# Patient Record
Sex: FEMALE | Race: WHITE | NOT HISPANIC OR LATINO | Employment: UNEMPLOYED | URBAN - METROPOLITAN AREA
[De-identification: names, ages, dates, MRNs, and addresses within clinical notes are randomized per-mention and may not be internally consistent; named-entity substitution may affect disease eponyms.]

---

## 2017-02-14 ENCOUNTER — OFFICE VISIT (OUTPATIENT)
Dept: HEMATOLOGY/ONCOLOGY | Facility: CLINIC | Age: 52
End: 2017-02-14
Payer: COMMERCIAL

## 2017-02-14 VITALS
TEMPERATURE: 98 F | DIASTOLIC BLOOD PRESSURE: 58 MMHG | RESPIRATION RATE: 16 BRPM | HEART RATE: 83 BPM | SYSTOLIC BLOOD PRESSURE: 124 MMHG

## 2017-02-14 DIAGNOSIS — C50.411 MALIGNANT NEOPLASM OF UPPER-OUTER QUADRANT OF RIGHT FEMALE BREAST: Primary | ICD-10-CM

## 2017-02-14 PROCEDURE — 99999 PR PBB SHADOW E&M-EST. PATIENT-LVL III: CPT | Mod: PBBFAC,,, | Performed by: INTERNAL MEDICINE

## 2017-02-14 PROCEDURE — 99213 OFFICE O/P EST LOW 20 MIN: CPT | Mod: S$GLB,,, | Performed by: INTERNAL MEDICINE

## 2017-02-14 RX ORDER — AZELASTINE 1 MG/ML
SPRAY, METERED NASAL
Refills: 0 | COMMUNITY
Start: 2017-01-31 | End: 2018-06-28

## 2017-02-14 RX ORDER — BENZONATATE 200 MG/1
CAPSULE ORAL
Refills: 0 | COMMUNITY
Start: 2017-01-19 | End: 2017-02-14

## 2017-02-14 NOTE — PROGRESS NOTES
Subjective:       Patient ID: Astrid Mane is a 51 y.o. female.    Chief Complaint: Breast Cancer    HPI Mrs. Mane is a 51-year-old white female seen for  carcinoma of the right breast.    Physically she's been feeling well.  Only complaint is some excessive sweating with exercise.  She's having no unusual pain and she has no shortness of breath.  Hot flashes continue to be controlled.       She reports no new issues.  She's exercising 6 days a week appetite and bowel function have been good.  She has some minor arthralgias in her hands.    Hot flashes are well controlled with Effexor     Mammograms on February 23, 2015 showed an asymmetric density in the right breast which had become more conspicuous.  A biopsy was recommended.   On March 2 a needle biopsy showed infiltrating ductal carcinoma grade 2 with some intermediate grade DCIS.  The tumor was % ER positive, % CO positive, HER-2 was 2+ with negative FISH.  An MRI on March 6 showed a 1.1 cm lesion in the posterior right breast.  On March 23, A left breast biopsy for an abnormality noted at the 1:00 position showed focal adenosis and focal ductal hyperplasia without atypia.    On March 26 she underwent lumpectomy and sentinel lymph node biopsy which was performed by Dr. Ishmael Aguilera at [facility].  The pathology from that surgery revealed a  low grade infiltrating ductal carcinoma, histologic grade 2, nuclear grade 2, mitotic index 1, which measured 6 mm.  There was some associated DCIS. Margins were negative.  0/2 axillary lymph nodes were involved with tumor.     Final pathological stage TIb N0, stage IA.  An Oncotype score returned at 18 (low risk).    She completed radiation therapy on June 4, 2015 and started tamoxifen several weeks later.  Review of Systems   Constitutional: Negative for activity change, appetite change and unexpected weight change.        Some mild hot flashes   Respiratory: Negative for cough and  shortness of breath.    Cardiovascular: Negative for chest pain.   Gastrointestinal: Negative for abdominal pain and nausea.   Musculoskeletal: Negative for arthralgias and back pain.   Neurological: Negative for headaches.   Psychiatric/Behavioral: Positive for sleep disturbance. Negative for dysphoric mood. The patient is not nervous/anxious.        Objective:      Physical Exam   Constitutional: She appears well-developed and well-nourished. No distress.   Eyes: No scleral icterus.   Cardiovascular: Normal rate, regular rhythm and normal heart sounds.    Pulmonary/Chest: Effort normal and breath sounds normal. She has no wheezes. She has no rales. Right breast exhibits no mass, no nipple discharge and no skin change. Left breast exhibits no mass, no nipple discharge and no skin change.       Abdominal: Soft. She exhibits no mass. There is no tenderness.   Musculoskeletal: She exhibits no edema.   Skin: No rash noted. No erythema.   Psychiatric: She has a normal mood and affect. Her behavior is normal. Thought content normal.       Assessment:       1. Malignant neoplasm of upper-outer quadrant of right female breast        Plan:           RTC 3 months.

## 2017-03-18 ENCOUNTER — PATIENT MESSAGE (OUTPATIENT)
Dept: HEMATOLOGY/ONCOLOGY | Facility: CLINIC | Age: 52
End: 2017-03-18

## 2017-05-02 ENCOUNTER — DOCUMENTATION ONLY (OUTPATIENT)
Dept: HEMATOLOGY/ONCOLOGY | Facility: CLINIC | Age: 52
End: 2017-05-02

## 2017-05-02 DIAGNOSIS — T45.1X5A HOT FLASHES DUE TO TAMOXIFEN: ICD-10-CM

## 2017-05-02 DIAGNOSIS — R23.2 HOT FLASHES DUE TO TAMOXIFEN: ICD-10-CM

## 2017-05-02 RX ORDER — VENLAFAXINE HYDROCHLORIDE 75 MG/1
CAPSULE, EXTENDED RELEASE ORAL
Qty: 30 CAPSULE | Refills: 11 | Status: SHIPPED | OUTPATIENT
Start: 2017-05-02 | End: 2017-09-12 | Stop reason: SDUPTHER

## 2017-05-02 NOTE — PROGRESS NOTES
Right mammogram results from [facility] 4/26/17 - post-treatment changes with fat necrosis type calcification at UOQ lumpectomy site.

## 2017-05-15 ENCOUNTER — OFFICE VISIT (OUTPATIENT)
Dept: HEMATOLOGY/ONCOLOGY | Facility: CLINIC | Age: 52
End: 2017-05-15
Payer: COMMERCIAL

## 2017-05-15 VITALS
TEMPERATURE: 98 F | DIASTOLIC BLOOD PRESSURE: 76 MMHG | RESPIRATION RATE: 15 BRPM | HEART RATE: 80 BPM | SYSTOLIC BLOOD PRESSURE: 135 MMHG

## 2017-05-15 DIAGNOSIS — C50.411 MALIGNANT NEOPLASM OF UPPER-OUTER QUADRANT OF RIGHT FEMALE BREAST: Primary | ICD-10-CM

## 2017-05-15 PROCEDURE — 99999 PR PBB SHADOW E&M-EST. PATIENT-LVL III: CPT | Mod: PBBFAC,,, | Performed by: INTERNAL MEDICINE

## 2017-05-15 PROCEDURE — 99213 OFFICE O/P EST LOW 20 MIN: CPT | Mod: S$GLB,,, | Performed by: INTERNAL MEDICINE

## 2017-05-15 NOTE — PROGRESS NOTES
Answers for HPI/ROS submitted by the patient on 5/13/2017   appetite change : No  cough: Yes  chest pain: No  diarrhea: No  rash: No  headaches: No  adenopathy: No  Subjective:       Patient ID: Astrid Mane is a 51 y.o. female.    Chief Complaint: No chief complaint on file.    HPI Mrs. Mane is a 51-year-old white female seen for  carcinoma of the right breast.  Her major current issues then increase in her hot flashes.  She was given some clonidine to try by . She has not started that yet.  She's exercising on a regular basis.  Otherwise, she feels well.    Breast history:  Mammograms on February 23, 2015 showed an asymmetric density in the right breast which had become more conspicuous.  A biopsy was recommended.   On March 2 a needle biopsy showed infiltrating ductal carcinoma grade 2 with some intermediate grade DCIS.  The tumor was % ER positive, % CA positive, HER-2 was 2+ with negative FISH.  An MRI on March 6 showed a 1.1 cm lesion in the posterior right breast.  On March 23, A left breast biopsy for an abnormality noted at the 1:00 position showed focal adenosis and focal ductal hyperplasia without atypia.    On March 26 she underwent lumpectomy and sentinel lymph node biopsy which was performed by Dr. Ishmael Aguilera at [facility].  The pathology from that surgery revealed a  low grade infiltrating ductal carcinoma, histologic grade 2, nuclear grade 2, mitotic index 1, which measured 6 mm.  There was some associated DCIS. Margins were negative.  0/2 axillary lymph nodes were involved with tumor.     Final pathological stage TIb N0, stage IA.  An Oncotype score returned at 18 (low risk).    She completed radiation therapy on June 4, 2015 and started tamoxifen several weeks later.  Review of Systems   Constitutional: Negative for activity change, appetite change and unexpected weight change.        Increased hot flashes   Respiratory: Negative for cough and  shortness of breath.    Cardiovascular: Negative for chest pain.   Gastrointestinal: Negative for abdominal pain and nausea.   Musculoskeletal: Negative for arthralgias and back pain.   Skin: Negative for rash.   Neurological: Negative for headaches.   Psychiatric/Behavioral: Negative for dysphoric mood. The patient is not nervous/anxious.        Objective:      Physical Exam   Constitutional: She appears well-developed and well-nourished. No distress.   Eyes: No scleral icterus.   Cardiovascular: Normal rate, regular rhythm and normal heart sounds.    Pulmonary/Chest: Effort normal and breath sounds normal. She has no wheezes. She has no rales. Right breast exhibits no mass, no nipple discharge and no skin change. Left breast exhibits no mass, no nipple discharge and no skin change.       Abdominal: Soft. She exhibits no mass. There is no tenderness.   Musculoskeletal: She exhibits no edema.   Skin: No rash noted. No erythema.   Psychiatric: She has a normal mood and affect. Her behavior is normal. Thought content normal.       Assessment:     right mammogram in April showed some calcifications consistent with fat necrosis  1. Malignant neoplasm of upper-outer quadrant of right female breast        Plan:         Start Clonidine.  RTC 4 months.  Labs next visit.

## 2017-07-02 DIAGNOSIS — C50.919 BREAST CANCER, STAGE 1, UNSPECIFIED LATERALITY: Primary | ICD-10-CM

## 2017-07-03 RX ORDER — TAMOXIFEN CITRATE 20 MG/1
TABLET ORAL
Qty: 90 TABLET | Refills: 3 | Status: SHIPPED | OUTPATIENT
Start: 2017-07-03 | End: 2018-01-08 | Stop reason: SDUPTHER

## 2017-07-17 ENCOUNTER — PATIENT MESSAGE (OUTPATIENT)
Dept: HEMATOLOGY/ONCOLOGY | Facility: CLINIC | Age: 52
End: 2017-07-17

## 2017-07-17 DIAGNOSIS — C50.412 MALIGNANT NEOPLASM OF UPPER-OUTER QUADRANT OF BOTH BREASTS IN FEMALE, ESTROGEN RECEPTOR POSITIVE: Primary | ICD-10-CM

## 2017-07-17 DIAGNOSIS — Z17.0 MALIGNANT NEOPLASM OF UPPER-OUTER QUADRANT OF BOTH BREASTS IN FEMALE, ESTROGEN RECEPTOR POSITIVE: Primary | ICD-10-CM

## 2017-07-17 DIAGNOSIS — C50.411 MALIGNANT NEOPLASM OF UPPER-OUTER QUADRANT OF BOTH BREASTS IN FEMALE, ESTROGEN RECEPTOR POSITIVE: Primary | ICD-10-CM

## 2017-09-12 DIAGNOSIS — R23.2 HOT FLASHES DUE TO TAMOXIFEN: ICD-10-CM

## 2017-09-12 DIAGNOSIS — T45.1X5A HOT FLASHES DUE TO TAMOXIFEN: ICD-10-CM

## 2017-09-12 RX ORDER — VENLAFAXINE HYDROCHLORIDE 75 MG/1
CAPSULE, EXTENDED RELEASE ORAL
Qty: 90 CAPSULE | Refills: 3 | Status: SHIPPED | OUTPATIENT
Start: 2017-09-12 | End: 2018-01-08 | Stop reason: SDUPTHER

## 2017-09-26 ENCOUNTER — OFFICE VISIT (OUTPATIENT)
Dept: HEMATOLOGY/ONCOLOGY | Facility: CLINIC | Age: 52
End: 2017-09-26
Payer: COMMERCIAL

## 2017-09-26 ENCOUNTER — LAB VISIT (OUTPATIENT)
Dept: LAB | Facility: HOSPITAL | Age: 52
End: 2017-09-26
Attending: INTERNAL MEDICINE
Payer: COMMERCIAL

## 2017-09-26 VITALS
HEART RATE: 70 BPM | TEMPERATURE: 98 F | DIASTOLIC BLOOD PRESSURE: 81 MMHG | SYSTOLIC BLOOD PRESSURE: 131 MMHG | RESPIRATION RATE: 16 BRPM

## 2017-09-26 DIAGNOSIS — C50.412 MALIGNANT NEOPLASM OF UPPER-OUTER QUADRANT OF BOTH BREASTS IN FEMALE, ESTROGEN RECEPTOR POSITIVE: ICD-10-CM

## 2017-09-26 DIAGNOSIS — Z17.0 MALIGNANT NEOPLASM OF UPPER-OUTER QUADRANT OF RIGHT BREAST IN FEMALE, ESTROGEN RECEPTOR POSITIVE: Primary | ICD-10-CM

## 2017-09-26 DIAGNOSIS — C50.411 MALIGNANT NEOPLASM OF UPPER-OUTER QUADRANT OF RIGHT BREAST IN FEMALE, ESTROGEN RECEPTOR POSITIVE: Primary | ICD-10-CM

## 2017-09-26 DIAGNOSIS — C50.411 MALIGNANT NEOPLASM OF UPPER-OUTER QUADRANT OF BOTH BREASTS IN FEMALE, ESTROGEN RECEPTOR POSITIVE: ICD-10-CM

## 2017-09-26 DIAGNOSIS — Z17.0 MALIGNANT NEOPLASM OF UPPER-OUTER QUADRANT OF BOTH BREASTS IN FEMALE, ESTROGEN RECEPTOR POSITIVE: ICD-10-CM

## 2017-09-26 LAB
ALBUMIN SERPL BCP-MCNC: 3.5 G/DL
ALP SERPL-CCNC: 60 U/L
ALT SERPL W/O P-5'-P-CCNC: 26 U/L
ANION GAP SERPL CALC-SCNC: 6 MMOL/L
AST SERPL-CCNC: 22 U/L
BILIRUB SERPL-MCNC: 0.5 MG/DL
BUN SERPL-MCNC: 12 MG/DL
CALCIUM SERPL-MCNC: 8.7 MG/DL
CHLORIDE SERPL-SCNC: 108 MMOL/L
CHOLEST SERPL-MCNC: 212 MG/DL
CHOLEST/HDLC SERPL: 3 {RATIO}
CO2 SERPL-SCNC: 27 MMOL/L
CREAT SERPL-MCNC: 0.8 MG/DL
ERYTHROCYTE [DISTWIDTH] IN BLOOD BY AUTOMATED COUNT: 12.8 %
EST. GFR  (AFRICAN AMERICAN): >60 ML/MIN/1.73 M^2
EST. GFR  (NON AFRICAN AMERICAN): >60 ML/MIN/1.73 M^2
GLUCOSE SERPL-MCNC: 110 MG/DL
HCT VFR BLD AUTO: 39.9 %
HDLC SERPL-MCNC: 71 MG/DL
HDLC SERPL: 33.5 %
HGB BLD-MCNC: 13.9 G/DL
LDLC SERPL CALC-MCNC: 121.4 MG/DL
MCH RBC QN AUTO: 30.9 PG
MCHC RBC AUTO-ENTMCNC: 34.8 G/DL
MCV RBC AUTO: 89 FL
NEUTROPHILS # BLD AUTO: 2 K/UL
NONHDLC SERPL-MCNC: 141 MG/DL
PLATELET # BLD AUTO: 210 K/UL
PMV BLD AUTO: 9.3 FL
POTASSIUM SERPL-SCNC: 4.1 MMOL/L
PROT SERPL-MCNC: 6.7 G/DL
RBC # BLD AUTO: 4.5 M/UL
SODIUM SERPL-SCNC: 141 MMOL/L
TRIGL SERPL-MCNC: 98 MG/DL
TSH SERPL DL<=0.005 MIU/L-ACNC: 1.44 UIU/ML
WBC # BLD AUTO: 4.12 K/UL

## 2017-09-26 PROCEDURE — 80053 COMPREHEN METABOLIC PANEL: CPT

## 2017-09-26 PROCEDURE — 99999 PR PBB SHADOW E&M-EST. PATIENT-LVL II: CPT | Mod: PBBFAC,,, | Performed by: INTERNAL MEDICINE

## 2017-09-26 PROCEDURE — 80061 LIPID PANEL: CPT

## 2017-09-26 PROCEDURE — 99213 OFFICE O/P EST LOW 20 MIN: CPT | Mod: S$GLB,,, | Performed by: INTERNAL MEDICINE

## 2017-09-26 PROCEDURE — 85027 COMPLETE CBC AUTOMATED: CPT

## 2017-09-26 PROCEDURE — 84443 ASSAY THYROID STIM HORMONE: CPT

## 2017-09-26 PROCEDURE — 36415 COLL VENOUS BLD VENIPUNCTURE: CPT

## 2017-09-26 NOTE — PROGRESS NOTES
Subjective:       Patient ID: Astrid Mane is a 52 y.o. female.    Chief Complaint: No chief complaint on file.    HPI Mrs. Mane is a 52-year-old white female seen for  carcinoma of the right breast.  Today she reports she's been feeling physically well.  She just returned from a trip to [place] which she enjoyed.  She's exercising on a regular basis.    Only pain is some occasional right-sided abdominal pain.  She did do a stool screening for colon cancer which was negative but plans to have colonoscopy.  Her hot flashes been better with clonidine.      Breast history:  Mammograms on February 23, 2015 showed an asymmetric density in the right breast which had become more conspicuous.  A biopsy was recommended.   On March 2 a needle biopsy showed infiltrating ductal carcinoma grade 2 with some intermediate grade DCIS.  The tumor was % ER positive, % NC positive, HER-2 was 2+ with negative FISH.  An MRI on March 6 showed a 1.1 cm lesion in the posterior right breast.  On March 23, A left breast biopsy for an abnormality noted at the 1:00 position showed focal adenosis and focal ductal hyperplasia without atypia.    On March 26 she underwent lumpectomy and sentinel lymph node biopsy which was performed by Dr. Ishmael Aguilera at [facility].  The pathology from that surgery revealed a  low grade infiltrating ductal carcinoma, histologic grade 2, nuclear grade 2, mitotic index 1, which measured 6 mm.  There was some associated DCIS. Margins were negative.  0/2 axillary lymph nodes were involved with tumor.     Final pathological stage TIb N0, stage IA.  An Oncotype score returned at 18 (low risk).    She completed radiation therapy on June 4, 2015 and started tamoxifen several weeks later.  Review of Systems   Constitutional: Negative for activity change, appetite change and unexpected weight change.         hot flashes   Respiratory: Negative for cough and shortness of breath.     Cardiovascular: Negative for chest pain.   Gastrointestinal: Negative for abdominal pain and nausea.   Musculoskeletal: Negative for arthralgias and back pain.   Skin: Negative for rash.   Neurological: Negative for headaches.   Psychiatric/Behavioral: Negative for dysphoric mood. The patient is not nervous/anxious.        Objective:      Physical Exam   Constitutional: She appears well-developed and well-nourished. No distress.   Eyes: No scleral icterus.   Cardiovascular: Normal rate, regular rhythm and normal heart sounds.    Pulmonary/Chest: Effort normal and breath sounds normal. She has no wheezes. She has no rales. Right breast exhibits no mass, no nipple discharge and no skin change. Left breast exhibits no mass, no nipple discharge and no skin change.       Abdominal: Soft. She exhibits no mass. There is no tenderness.   Musculoskeletal: She exhibits no edema.   Skin: No rash noted. No erythema.   Psychiatric: She has a normal mood and affect. Her behavior is normal. Thought content normal.       Assessment:     CBC and metabolic profile are unremarkable.  Lipid profile shows cholesterol 212, HDL-71   ,   1. Malignant neoplasm of upper-outer quadrant of right breast in female, estrogen receptor positive        Plan:           RTC 4 months.

## 2018-01-08 ENCOUNTER — PATIENT MESSAGE (OUTPATIENT)
Dept: HEMATOLOGY/ONCOLOGY | Facility: CLINIC | Age: 53
End: 2018-01-08

## 2018-01-08 DIAGNOSIS — C50.919 BREAST CANCER, STAGE 1, UNSPECIFIED LATERALITY: ICD-10-CM

## 2018-01-08 DIAGNOSIS — R23.2 HOT FLASHES DUE TO TAMOXIFEN: ICD-10-CM

## 2018-01-08 DIAGNOSIS — T45.1X5A HOT FLASHES DUE TO TAMOXIFEN: ICD-10-CM

## 2018-01-08 RX ORDER — TAMOXIFEN CITRATE 20 MG/1
20 TABLET ORAL DAILY
Qty: 30 TABLET | Refills: 6 | Status: SHIPPED | OUTPATIENT
Start: 2018-01-08 | End: 2018-12-03 | Stop reason: SDUPTHER

## 2018-01-08 RX ORDER — VENLAFAXINE HYDROCHLORIDE 75 MG/1
75 CAPSULE, EXTENDED RELEASE ORAL DAILY
Qty: 30 CAPSULE | Refills: 6 | Status: SHIPPED | OUTPATIENT
Start: 2018-01-08 | End: 2018-12-31 | Stop reason: SDUPTHER

## 2018-01-30 ENCOUNTER — OFFICE VISIT (OUTPATIENT)
Dept: HEMATOLOGY/ONCOLOGY | Facility: CLINIC | Age: 53
End: 2018-01-30
Payer: COMMERCIAL

## 2018-01-30 VITALS
HEIGHT: 67 IN | OXYGEN SATURATION: 96 % | TEMPERATURE: 98 F | RESPIRATION RATE: 16 BRPM | SYSTOLIC BLOOD PRESSURE: 122 MMHG | DIASTOLIC BLOOD PRESSURE: 59 MMHG | HEART RATE: 68 BPM

## 2018-01-30 DIAGNOSIS — Z17.0 MALIGNANT NEOPLASM OF UPPER-OUTER QUADRANT OF RIGHT BREAST IN FEMALE, ESTROGEN RECEPTOR POSITIVE: ICD-10-CM

## 2018-01-30 DIAGNOSIS — C50.411 MALIGNANT NEOPLASM OF UPPER-OUTER QUADRANT OF RIGHT BREAST IN FEMALE, ESTROGEN RECEPTOR POSITIVE: ICD-10-CM

## 2018-01-30 DIAGNOSIS — N95.1 VAGINAL DRYNESS, MENOPAUSAL: ICD-10-CM

## 2018-01-30 PROCEDURE — 99999 PR PBB SHADOW E&M-EST. PATIENT-LVL IV: CPT | Mod: PBBFAC,,, | Performed by: PHYSICIAN ASSISTANT

## 2018-01-30 PROCEDURE — 99213 OFFICE O/P EST LOW 20 MIN: CPT | Mod: S$GLB,,, | Performed by: PHYSICIAN ASSISTANT

## 2018-01-30 NOTE — PROGRESS NOTES
Subjective:       Patient ID: Astrid Mane is a 52 y.o. female.    Chief Complaint: Malignant neoplasm of upper-outer quadrant of right breast i    Mrs. Mane is a 52-year-old white female seen for  carcinoma of the right breast.  She is currently on adjuvant Tamoxifen.    She had mammogram at [facility] on 10/17/17   recommended f/u mammogram in April 2018.  LMP was February 2016.    Recent sinusitis X 2 days. No fever, chills, just sinus pressure.   Exercising 5 days a week.   Hot flashes improved with clonidine.   Has c-scope scheduled for March at [facility].   Notes some vaginal dryness since starting Tamoxifen.   She notes some occasional left breast discomfort along the lumpectomy incision that comes and goes        Breast history:  Mammograms on February 23, 2015 showed an asymmetric density in the right breast which had become more conspicuous.  A biopsy was recommended.   On March 2 a needle biopsy showed infiltrating ductal carcinoma grade 2 with some intermediate grade DCIS.  The tumor was % ER positive, % VA positive, HER-2 was 2+ with negative FISH.  An MRI on March 6 showed a 1.1 cm lesion in the posterior right breast.  On March 23, A left breast biopsy for an abnormality noted at the 1:00 position showed focal adenosis and focal ductal hyperplasia without atypia.     On March 26 she underwent lumpectomy and sentinel lymph node biopsy which was performed by Dr. Ishmael Aguilera at [facility].  The pathology from that surgery revealed a  low grade infiltrating ductal carcinoma, histologic grade 2, nuclear grade 2, mitotic index 1, which measured 6 mm.  There was some associated DCIS. Margins were negative.  0/2 axillary lymph nodes were involved with tumor.      Final pathological stage TIb N0, stage IA.  An Oncotype score returned at 18 (low risk).     She completed radiation therapy on June 4, 2015 and started tamoxifen several weeks later.      Review of Systems    Constitutional: Negative.    HENT: Negative for congestion, rhinorrhea, sore throat and trouble swallowing.    Eyes: Negative for visual disturbance.   Respiratory: Negative for cough, chest tightness and shortness of breath.    Cardiovascular: Negative for chest pain, palpitations and leg swelling.   Gastrointestinal: Negative for abdominal pain, blood in stool, constipation, diarrhea, nausea and vomiting.   Genitourinary: Negative for dysuria, hematuria and vaginal bleeding.        Vaginal dryness     Musculoskeletal: Negative for arthralgias, back pain and myalgias.   Skin: Negative for pallor and rash.   Neurological: Negative for dizziness, weakness and headaches.   Hematological: Negative for adenopathy. Does not bruise/bleed easily.   Psychiatric/Behavioral: Negative for dysphoric mood and suicidal ideas. The patient is not nervous/anxious.        Objective:      Physical Exam   Constitutional: She is oriented to person, place, and time. She appears well-developed and well-nourished. No distress.   Presents alone   HENT:   Head: Normocephalic.   Mouth/Throat: Oropharynx is clear and moist. No oropharyngeal exudate.   Eyes: Conjunctivae are normal. Pupils are equal, round, and reactive to light. No scleral icterus.   Neck: Normal range of motion. Neck supple. No thyromegaly present.   Cardiovascular: Normal rate and regular rhythm.    Pulmonary/Chest: Effort normal and breath sounds normal. No respiratory distress.   Right breast with well healed lumpectomy incision with some underlying fibrosis. No mass, nodule or skin changes. Left breast without mass, nodule or skin changes. No axillary or supraclavicular adenopathy.    Abdominal: Soft. Bowel sounds are normal. She exhibits no distension and no mass. There is no tenderness.   Musculoskeletal: Normal range of motion. She exhibits no edema or tenderness.   No spinal or paraspinal tenderness to palpation     Lymphadenopathy:     She has no cervical  adenopathy.   Neurological: She is alert and oriented to person, place, and time. No cranial nerve deficit.   Skin: Skin is warm and dry.   Psychiatric: She has a normal mood and affect. Her behavior is normal. Thought content normal.   Vitals reviewed.      Assessment:       1. Malignant neoplasm of upper-outer quadrant of right breast in female, estrogen receptor positive    2. Vaginal dryness, menopausal        Plan:       1)Continue Tamoxifen and return to clinic in 4 months.  Mammogram due in 4/2018, to be scheduled at [facility].  2)Referral to .    All questions answered to satisfaction of patient.           Distress Screening Results: Psychosocial Distress screening score of Distress Score: 0 noted and reviewed. No intervention indicated.

## 2018-01-30 NOTE — Clinical Note
Srinivas in 4 months Referral to Dr. Pilar delgado's clinic (referral is in, [facility] clinic) thanks-

## 2018-02-01 ENCOUNTER — PATIENT MESSAGE (OUTPATIENT)
Dept: HEMATOLOGY/ONCOLOGY | Facility: CLINIC | Age: 53
End: 2018-02-01

## 2018-02-01 DIAGNOSIS — J06.9 UPPER RESPIRATORY TRACT INFECTION, UNSPECIFIED TYPE: Primary | ICD-10-CM

## 2018-02-01 RX ORDER — AZITHROMYCIN 250 MG/1
TABLET, FILM COATED ORAL
Qty: 2 TABLET | Refills: 0 | Status: SHIPPED | OUTPATIENT
Start: 2018-02-01 | End: 2018-02-06

## 2018-02-16 ENCOUNTER — TELEPHONE (OUTPATIENT)
Dept: OBSTETRICS AND GYNECOLOGY | Facility: CLINIC | Age: 53
End: 2018-02-16

## 2018-02-16 NOTE — TELEPHONE ENCOUNTER
Called Astrid Mane  to schedule an initial survivorship consult / evaluation with Dr. Ferrer, as suggested by Destiny DEE at her recent appointment.     Left message that our next available appointment is on March 22 at 9am and that I have held that time slot for her.   Phone number and extension given to call if appointment adjustments need to be made.    Reminder letter printed and sent via mail.

## 2018-02-16 NOTE — TELEPHONE ENCOUNTER
----- Message from [place] MA sent at 2/15/2018 11:59 AM CST -----      ----- Message -----  From: Jessica Aviles  Sent: 1/31/2018   7:37 AM  To: Nabil Singer    Good morning, Destiny DEE saw this patient in our department yesterday and is wanting her to follow up with with Dr. Ferrer. Please assist with scheduling this.  Thank you

## 2018-03-22 ENCOUNTER — OFFICE VISIT (OUTPATIENT)
Dept: OBSTETRICS AND GYNECOLOGY | Facility: CLINIC | Age: 53
End: 2018-03-22
Attending: OBSTETRICS & GYNECOLOGY
Payer: COMMERCIAL

## 2018-03-22 ENCOUNTER — LAB VISIT (OUTPATIENT)
Dept: LAB | Facility: OTHER | Age: 53
End: 2018-03-22
Attending: OBSTETRICS & GYNECOLOGY
Payer: COMMERCIAL

## 2018-03-22 VITALS — DIASTOLIC BLOOD PRESSURE: 70 MMHG | SYSTOLIC BLOOD PRESSURE: 100 MMHG | HEIGHT: 66 IN

## 2018-03-22 DIAGNOSIS — R63.5 WEIGHT GAIN: ICD-10-CM

## 2018-03-22 DIAGNOSIS — Z13.21 ENCOUNTER FOR VITAMIN DEFICIENCY SCREENING: ICD-10-CM

## 2018-03-22 DIAGNOSIS — N95.1 MENOPAUSAL SYMPTOM: ICD-10-CM

## 2018-03-22 DIAGNOSIS — Z01.419 ENCOUNTER FOR GYNECOLOGICAL EXAMINATION WITHOUT ABNORMAL FINDING: ICD-10-CM

## 2018-03-22 DIAGNOSIS — N95.1 MENOPAUSAL SYMPTOM: Primary | ICD-10-CM

## 2018-03-22 LAB
ESTRADIOL SERPL-MCNC: <10 PG/ML
FSH SERPL-ACNC: 31 MIU/ML
T3FREE SERPL-MCNC: 2.5 PG/ML
T4 FREE SERPL-MCNC: 0.87 NG/DL
TSH SERPL DL<=0.005 MIU/L-ACNC: 0.95 UIU/ML

## 2018-03-22 PROCEDURE — 84439 ASSAY OF FREE THYROXINE: CPT

## 2018-03-22 PROCEDURE — 82306 VITAMIN D 25 HYDROXY: CPT

## 2018-03-22 PROCEDURE — 36415 COLL VENOUS BLD VENIPUNCTURE: CPT

## 2018-03-22 PROCEDURE — 99386 PREV VISIT NEW AGE 40-64: CPT | Mod: S$GLB,,, | Performed by: OBSTETRICS & GYNECOLOGY

## 2018-03-22 PROCEDURE — 84402 ASSAY OF FREE TESTOSTERONE: CPT

## 2018-03-22 PROCEDURE — 84481 FREE ASSAY (FT-3): CPT

## 2018-03-22 PROCEDURE — 84443 ASSAY THYROID STIM HORMONE: CPT

## 2018-03-22 PROCEDURE — 83001 ASSAY OF GONADOTROPIN (FSH): CPT

## 2018-03-22 PROCEDURE — 82670 ASSAY OF TOTAL ESTRADIOL: CPT

## 2018-03-22 RX ORDER — CLONIDINE HYDROCHLORIDE 0.1 MG/1
TABLET ORAL
Refills: 0 | COMMUNITY
Start: 2018-01-31 | End: 2018-12-31 | Stop reason: SDUPTHER

## 2018-03-22 NOTE — LETTER
March 26, 2018      Destiny Hernandez PA-C  [address]  [facility] [address]           [facility]  [address], [address]  [facility] [address]  Phone: [phone]  Fax: [phone]          Patient: Astrid Mane   MR Number: [id number]   YOB: 1965   Date of Visit: 3/22/2018       Dear Destiny Hernandez:    Thank you for referring Astrid Mane to me for evaluation. Attached you will find relevant portions of my assessment and plan of care.    If you have questions, please do not hesitate to call me. I look forward to following Astrid Mane along with you.    Sincerely,    Monet Ferrer MD    Enclosure  CC:  No Recipients    If you would like to receive this communication electronically, please contact [email or web address] or [phone] to request more information on [employer] access.    For providers and/or their staff who would like to refer a patient to [facility], please contact us through our one-stop-shop provider referral line, [facility], at [phone].    If you feel you have received this communication in error or would no longer like to receive these types of communications, please e-mail [email or web address]

## 2018-03-23 LAB
25(OH)D3+25(OH)D2 SERPL-MCNC: 46 NG/ML
TESTOST FREE SERPL-MCNC: 0.4 PG/ML

## 2018-03-27 NOTE — PROGRESS NOTES
SUBJECTIVE:   52 y.o. female  presents for survivorship. . No LMP recorded. Patient is not currently having periods (Reason: Other)..  In , On  a needle biopsy showed infiltrating ductal carcinoma grade 2 with some intermediate grade DCIS.  The tumor was % ER positive, % VA positive, HER-2 was 2+ with negative FISH.  An MRI on  showed a 1.1 cm lesion in the posterior right breast.  On , A left breast biopsy for an abnormality noted at the 1:00 position showed focal adenosis and focal ductal hyperplasia without atypia.     On  she underwent lumpectomy and sentinel lymph node biopsy which was performed by Dr. Ishmael Aguilera [place].  The pathology from that surgery revealed a  low grade infiltrating ductal carcinoma, histologic grade 2, nuclear grade 2, mitotic index 1, which measured 6 mm.  There was some associated DCIS. Margins were negative.  0/2 axillary lymph nodes were involved with tumor.      Final pathological stage TIb N0, stage IA.  An Oncotype score returned at 18 (low risk).     She completed radiation therapy on 2015 and started tamoxifen several weeks later  She reports hot flashes on Tamoxifen. She reports trying Effexor and Clonidine with minimal improvement. She also reports vaginal dryness and pain with intercourse. She is using Replens- not helping. She has questions about CO2 laser. She reports her last period was in 2017. She wakes up at 2am and generally  Goes back to sleep. She gets about 4-6 hours a night of sleep. She reports exercising 6 days per week and still having issues with her weight. .     Past Medical History:   Diagnosis Date    Asthma     Breast cancer     right breast estrogen receptor positive     Past Surgical History:   Procedure Laterality Date    BREAST LUMPECTOMY      right breast     SECTION      total 3     Social History     Social History    Marital status:       Spouse name: N/A    Number of children: N/A    Years of education: N/A     Occupational History    Not on file.     Social History Main Topics    Smoking status: Former Smoker     Types: Cigarettes    Smokeless tobacco: Former User    Alcohol use Yes    Drug use: No    Sexual activity: Yes     Partners: Male     Birth control/ protection: None     Other Topics Concern    Not on file     Social History Narrative    No narrative on file     Family History   Problem Relation Age of Onset    Melanoma Maternal Grandmother 70    Ovarian cancer Neg Hx     Breast cancer Neg Hx     Cancer Neg Hx     Colon cancer Neg Hx      OB History    Para Term  AB Living   3 3           SAB TAB Ectopic Multiple Live Births                  # Outcome Date GA Lbr Marc/2nd Weight Sex Delivery Anes PTL Lv   3 Para            2 Para            1 Para                       Current Outpatient Prescriptions   Medication Sig Dispense Refill    alprazolam (XANAX) 0.5 MG tablet   1    azelastine (ASTELIN) 137 mcg (0.1 %) nasal spray TWO SPRAYS IEN BID PRN  0    desoximetasone (TOPICORT) 0.25 % cream       GUAIFENESIN/PHENYLEPHRINE HCL (MUCINEX COLD ORAL) Take by mouth.      loratadine (CLARITIN) 10 mg tablet Take 10 mg by mouth once daily.      ranitidine (ZANTAC) 75 MG tablet Take 75 mg by mouth 2 (two) times daily.      tamoxifen (NOLVADEX) 20 MG Tab Take 1 tablet (20 mg total) by mouth once daily. 30 tablet 6    venlafaxine (EFFEXOR-XR) 75 MG 24 hr capsule Take 1 capsule (75 mg total) by mouth once daily. 30 capsule 6    cloNIDine (CATAPRES) 0.1 MG tablet TK 1 T PO  QD  0     No current facility-administered medications for this visit.      Allergies: Patient has no known allergies.     The 10-year ASCVD risk score ([place] KLAUDIA Kay, et al., 2013) is: 0.7%    Values used to calculate the score:      Age: 52 years      Sex: Female      Is Non- : No      Diabetic: No      Tobacco smoker: No       Systolic Blood Pressure: 100 mmHg      Is BP treated: No      HDL Cholesterol: 71 mg/dL      Total Cholesterol: 212 mg/dL      ROS:  Constitutional: no weight loss, weight gain, fever, fatigue  Eyes:  No vision changes, glasses/contacts  ENT/Mouth: No ulcers, sinus problems, ears ringing, headache  Cardiovascular: No inability to lie flat, chest pain, exercise intolerance, swelling, heart palpitations  Respiratory: No wheezing, coughing blood, shortness of breath, or cough  Gastrointestinal: No diarrhea, bloody stool, nausea/vomiting, constipation, gas, hemorrhoids  Genitourinary: No blood in urine, painful urination, urgency of urination, frequency of urination, incomplete emptying, incontinence, abnormal bleeding, painful periods, heavy periods, vaginal discharge, vaginal odor,+ painful intercourse, sexual problems, bleeding after intercourse, +vaginal dryness  Musculoskeletal: No muscle weakness  Skin/Breast: No painful breasts, nipple discharge, masses, rash, ulcers  Neurological: No passing out, seizures, numbness, headache  Endocrine: No diabetes, hypothyroid, hyperthyroid, +hot flashes, hair loss, abnormal hair growth, acne  Psychiatric: No depression, crying  Hematologic: No bruises, bleeding, swollen lymph nodes, anemia.      Physical Exam:   Constitutional: She is oriented to person, place, and time. She appears well-developed and well-nourished.      Neck: Normal range of motion. No tracheal deviation present. No thyromegaly present.    Cardiovascular: Exam reveals no edema.     Pulmonary/Chest: Effort normal. She exhibits no mass, no tenderness, no deformity and no retraction.        Abdominal: Soft. She exhibits no distension and no mass. There is no tenderness. There is no rebound and no guarding. No hernia. Hernia confirmed negative in the left inguinal area.     Genitourinary: Vagina normal and uterus normal. Rectal exam shows no external hemorrhoid. There is no rash, tenderness or lesion on the  right labia. There is no rash, tenderness or lesion on the left labia. Uterus is not deviated. Cervix is normal. No no adexnal prolapse. Right adnexum displays no mass, no tenderness and no fullness. Left adnexum displays no mass, no tenderness and no fullness. No tenderness, bleeding, rectocele, cystocele or unspecified prolapse of vaginal walls in the vagina. No vaginal discharge found. Cervix exhibits no motion tenderness, no discharge and no friability.           Musculoskeletal: Normal range of motion and moves all extremeties. She exhibits no edema.      Lymphadenopathy:        Right: No inguinal adenopathy present.        Left: No inguinal adenopathy present.    Neurological: She is alert and oriented to person, place, and time.    Skin: No rash noted. No erythema. No pallor.    Psychiatric: She has a normal mood and affect. Her behavior is normal. Judgment and thought content normal.   +vaginal atrophy    ASSESSMENT:   WWE  Breast cancer  Menopausal symptoms  Vaginal dryness  Dyspareunia    PLAN:   Face to face time 45 minutes- over 90 % spent in counseling and arranging follow up  Counseled patient on vaginal dryness - discussed risks/benefits of CO2 laser  Counseled patient on vitamin d supplementation  Will order labs to assess for menopause. Discussed use of Testosterone pellets for symptoms if patient on AI. Tamoxifen is her current regimen.  Continue Clonidine and Effexor for now.

## 2018-04-13 ENCOUNTER — TELEPHONE (OUTPATIENT)
Dept: OBSTETRICS AND GYNECOLOGY | Facility: CLINIC | Age: 53
End: 2018-04-13

## 2018-04-13 NOTE — TELEPHONE ENCOUNTER
----- Message from Gael Cortez sent at 4/13/2018 12:26 PM CDT -----  Contact: MARIA ESTHER LARIOS [[id number]]  Name of Who is Calling: MARIA ESTHER LARIOS [[id number]]      What is the request in detail: Would like to speak with staff in regard to knowing the name of the accupuntune that was recommended.      Can the clinic reply by [facility]: yes      What Number to Call Back if not in [facility]: [phone]

## 2018-05-08 DIAGNOSIS — R23.2 HOT FLASHES DUE TO TAMOXIFEN: ICD-10-CM

## 2018-05-08 DIAGNOSIS — T45.1X5A HOT FLASHES DUE TO TAMOXIFEN: ICD-10-CM

## 2018-05-08 RX ORDER — VENLAFAXINE HYDROCHLORIDE 75 MG/1
CAPSULE, EXTENDED RELEASE ORAL
Qty: 30 CAPSULE | Refills: 0 | Status: SHIPPED | OUTPATIENT
Start: 2018-05-08 | End: 2018-06-05 | Stop reason: SDUPTHER

## 2018-06-04 ENCOUNTER — OFFICE VISIT (OUTPATIENT)
Dept: HEMATOLOGY/ONCOLOGY | Facility: CLINIC | Age: 53
End: 2018-06-04
Payer: COMMERCIAL

## 2018-06-04 VITALS
DIASTOLIC BLOOD PRESSURE: 67 MMHG | HEART RATE: 75 BPM | TEMPERATURE: 98 F | HEIGHT: 67 IN | RESPIRATION RATE: 16 BRPM | SYSTOLIC BLOOD PRESSURE: 110 MMHG | OXYGEN SATURATION: 96 %

## 2018-06-04 DIAGNOSIS — Z17.0 MALIGNANT NEOPLASM OF UPPER-OUTER QUADRANT OF RIGHT BREAST IN FEMALE, ESTROGEN RECEPTOR POSITIVE: Primary | ICD-10-CM

## 2018-06-04 DIAGNOSIS — C50.411 MALIGNANT NEOPLASM OF UPPER-OUTER QUADRANT OF RIGHT BREAST IN FEMALE, ESTROGEN RECEPTOR POSITIVE: Primary | ICD-10-CM

## 2018-06-04 PROCEDURE — 99213 OFFICE O/P EST LOW 20 MIN: CPT | Mod: S$GLB,,, | Performed by: PHYSICIAN ASSISTANT

## 2018-06-04 PROCEDURE — 99999 PR PBB SHADOW E&M-EST. PATIENT-LVL IV: CPT | Mod: PBBFAC,,, | Performed by: PHYSICIAN ASSISTANT

## 2018-06-04 NOTE — PROGRESS NOTES
Subjective:       Patient ID: Astrdi Mane is a 52 y.o. female.    Chief Complaint: Malignant neoplasm of upper-outer quadrant of right breast i    Mrs. Mane is a 52-year-old white female seen for  carcinoma of the right breast.  She is currently on adjuvant Tamoxifen.    She had mammogram at [facility] on 10/17/17   recommended f/u mammogram in April 2018 which she had completed and per patient did not have any abnormalities.   LMP was February 2016.     Hot flashes manageable with clonidine.   She notes some occasional left breast discomfort along the lumpectomy incision that comes and goes.  Notes some generalized fatigue recently with heat and with more intense exercise.         Breast history:  Mammograms on February 23, 2015 showed an asymmetric density in the right breast which had become more conspicuous.  A biopsy was recommended.   On March 2 a needle biopsy showed infiltrating ductal carcinoma grade 2 with some intermediate grade DCIS.  The tumor was % ER positive, % CT positive, HER-2 was 2+ with negative FISH.  An MRI on March 6 showed a 1.1 cm lesion in the posterior right breast.  On March 23, A left breast biopsy for an abnormality noted at the 1:00 position showed focal adenosis and focal ductal hyperplasia without atypia.     On March 26 she underwent lumpectomy and sentinel lymph node biopsy which was performed by Dr. Ishmael Aguilera at [facility].  The pathology from that surgery revealed a  low grade infiltrating ductal carcinoma, histologic grade 2, nuclear grade 2, mitotic index 1, which measured 6 mm.  There was some associated DCIS. Margins were negative.  0/2 axillary lymph nodes were involved with tumor.      Final pathological stage TIb N0, stage IA.  An Oncotype score returned at 18 (low risk).     She completed radiation therapy on June 4, 2015 and started tamoxifen several weeks later.      Review of Systems   Constitutional: Negative.    HENT: Negative for  congestion, rhinorrhea, sore throat and trouble swallowing.    Eyes: Negative for visual disturbance.   Respiratory: Negative for cough, chest tightness and shortness of breath.    Cardiovascular: Negative for chest pain, palpitations and leg swelling.   Gastrointestinal: Negative for abdominal pain, blood in stool, constipation, diarrhea, nausea and vomiting.   Genitourinary: Negative for dysuria, hematuria and vaginal bleeding.        Vaginal dryness     Musculoskeletal: Negative for arthralgias, back pain and myalgias.   Skin: Negative for pallor and rash.   Neurological: Negative for dizziness, weakness and headaches.   Hematological: Negative for adenopathy. Does not bruise/bleed easily.   Psychiatric/Behavioral: Negative for dysphoric mood and suicidal ideas. The patient is not nervous/anxious.        Objective:      Physical Exam   Constitutional: She is oriented to person, place, and time. She appears well-developed and well-nourished. No distress.   Presents alone   HENT:   Head: Normocephalic.   Mouth/Throat: Oropharynx is clear and moist. No oropharyngeal exudate.   Eyes: Conjunctivae are normal. Pupils are equal, round, and reactive to light. No scleral icterus.   Neck: Normal range of motion. Neck supple. No thyromegaly present.   Cardiovascular: Normal rate and regular rhythm.    Pulmonary/Chest: Effort normal and breath sounds normal. No respiratory distress.   Right breast with well healed lumpectomy incision with some underlying fibrosis. No mass, nodule or skin changes. Left breast without mass, nodule or skin changes. No axillary or supraclavicular adenopathy.   Lungs clear to ausculation   Abdominal: Soft. Bowel sounds are normal. She exhibits no distension and no mass. There is no tenderness.   Musculoskeletal: Normal range of motion. She exhibits no edema or tenderness.   No spinal or paraspinal tenderness to palpation     Lymphadenopathy:     She has no cervical adenopathy.   Neurological: She  is alert and oriented to person, place, and time. No cranial nerve deficit.   Skin: Skin is warm and dry.   Psychiatric: She has a normal mood and affect. Her behavior is normal. Thought content normal.   Vitals reviewed.      Assessment:       1. Malignant neoplasm of upper-outer quadrant of right breast in female, estrogen receptor positive        Plan:       1)Continue Tamoxifen and return to clinic in 6 months.  Patient is going to request that recent mammogram results from [facility] to be faxed.       All questions answered to satisfaction of patient.           Distress Screening Results: Psychosocial Distress screening score of Distress Score: 0 noted and reviewed. No intervention indicated.

## 2018-06-05 DIAGNOSIS — R23.2 HOT FLASHES DUE TO TAMOXIFEN: ICD-10-CM

## 2018-06-05 DIAGNOSIS — T45.1X5A HOT FLASHES DUE TO TAMOXIFEN: ICD-10-CM

## 2018-06-05 RX ORDER — VENLAFAXINE HYDROCHLORIDE 75 MG/1
CAPSULE, EXTENDED RELEASE ORAL
Qty: 30 CAPSULE | Refills: 0 | Status: SHIPPED | OUTPATIENT
Start: 2018-06-05 | End: 2018-06-28 | Stop reason: SDUPTHER

## 2018-06-08 ENCOUNTER — DOCUMENTATION ONLY (OUTPATIENT)
Dept: HEMATOLOGY/ONCOLOGY | Facility: CLINIC | Age: 53
End: 2018-06-08

## 2018-06-08 NOTE — PROGRESS NOTES
Diagnostic right mammogram from April 19th performed at [facility] post treatment changes.

## 2018-06-20 ENCOUNTER — PATIENT MESSAGE (OUTPATIENT)
Dept: OBSTETRICS AND GYNECOLOGY | Facility: CLINIC | Age: 53
End: 2018-06-20

## 2018-06-28 ENCOUNTER — OFFICE VISIT (OUTPATIENT)
Dept: OBSTETRICS AND GYNECOLOGY | Facility: CLINIC | Age: 53
End: 2018-06-28
Attending: OBSTETRICS & GYNECOLOGY
Payer: COMMERCIAL

## 2018-06-28 VITALS — HEIGHT: 66 IN

## 2018-06-28 DIAGNOSIS — N95.0 POSTMENOPAUSAL BLEEDING: Primary | ICD-10-CM

## 2018-06-28 DIAGNOSIS — C50.919 MALIGNANT NEOPLASM OF FEMALE BREAST, UNSPECIFIED ESTROGEN RECEPTOR STATUS, UNSPECIFIED LATERALITY, UNSPECIFIED SITE OF BREAST: ICD-10-CM

## 2018-06-28 PROCEDURE — 58100 BIOPSY OF UTERUS LINING: CPT | Mod: S$GLB,,, | Performed by: OBSTETRICS & GYNECOLOGY

## 2018-06-28 PROCEDURE — 99214 OFFICE O/P EST MOD 30 MIN: CPT | Mod: 25,S$GLB,, | Performed by: OBSTETRICS & GYNECOLOGY

## 2018-06-28 NOTE — PROGRESS NOTES
SUBJECTIVE:   52 y.o. female   presents today complaining of vaginal bleeding. . No LMP recorded. Patient is not currently having periods (Reason: Other)..  She reports that she had 2 days of passing clots. She has a history of Breast cancer -ER+ on Tamoxifen. .        Past Medical History:   Diagnosis Date    Asthma     Breast cancer     right breast estrogen receptor positive     Past Surgical History:   Procedure Laterality Date    BREAST LUMPECTOMY      right breast     SECTION      total 3     Social History     Social History    Marital status:      Spouse name: N/A    Number of children: N/A    Years of education: N/A     Occupational History    Not on file.     Social History Main Topics    Smoking status: Former Smoker     Types: Cigarettes    Smokeless tobacco: Former User    Alcohol use Yes    Drug use: No    Sexual activity: Yes     Partners: Male     Birth control/ protection: None     Other Topics Concern    Not on file     Social History Narrative    No narrative on file     Family History   Problem Relation Age of Onset    Melanoma Maternal Grandmother 70    Ovarian cancer Neg Hx     Breast cancer Neg Hx     Cancer Neg Hx     Colon cancer Neg Hx      OB History    Para Term  AB Living   3 3           SAB TAB Ectopic Multiple Live Births                  # Outcome Date GA Lbr Marc/2nd Weight Sex Delivery Anes PTL Lv   3 Para            2 Para            1 Para                       Current Outpatient Prescriptions   Medication Sig Dispense Refill    alprazolam (XANAX) 0.5 MG tablet   1    cloNIDine (CATAPRES) 0.1 MG tablet TK 1 T PO  QD  0    desoximetasone (TOPICORT) 0.25 % cream       GUAIFENESIN/PHENYLEPHRINE HCL (MUCINEX COLD ORAL) Take by mouth.      loratadine (CLARITIN) 10 mg tablet Take 10 mg by mouth once daily.      ranitidine (ZANTAC) 75 MG tablet Take 75 mg by mouth 2 (two) times daily.      tamoxifen (NOLVADEX) 20 MG Tab Take 1  tablet (20 mg total) by mouth once daily. 30 tablet 6    venlafaxine (EFFEXOR-XR) 75 MG 24 hr capsule Take 1 capsule (75 mg total) by mouth once daily. 30 capsule 6     No current facility-administered medications for this visit.      Allergies: Patient has no known allergies.     The 10-year ASCVD risk score (Car RUDOLPH Jr., et al., 2013) is: 0.9%    Values used to calculate the score:      Age: 52 years      Sex: Female      Is Non- : No      Diabetic: No      Tobacco smoker: No      Systolic Blood Pressure: 110 mmHg      Is BP treated: No      HDL Cholesterol: 71 mg/dL      Total Cholesterol: 212 mg/dL      ROS:  Constitutional: no weight loss, weight gain, fever, fatigue    Gastrointestinal: No diarrhea, bloody stool, nausea/vomiting, constipation, gas, hemorrhoids  Genitourinary: No blood in urine, painful urination, urgency of urination, frequency of urination, incomplete emptying, incontinence,+ abnormal bleeding, painful periods, heavy periods, vaginal discharge, vaginal odor, painful intercourse, sexual problems, bleeding after intercourse.  Musculoskeletal: No muscle weakness  Skin/Breast: No painful breasts, nipple discharge, masses, rash, ulcers  Neurological: No passing out, seizures, numbness, headache  Endocrine: No diabetes, hypothyroid, hyperthyroid, hot flashes, hair loss, abnormal hair growth, acne  Psychiatric: No depression, crying  Hematologic: No bruises, bleeding, swollen lymph nodes, anemia.      Physical Exam  General- well developed, well nourished  Vulva- no masses, no lesions  Vagina-  no masses, no lesions  Cervix- no Cervical motion tenderness, no lesions  Uterus- normal size, nontender  Adnexa- nontender, no masses  The cervix was swabbed with Betadinex3 times 3.  The anterior lip of the cervix was grasped with a single toothed tenaculum. An os finder was used.  A uterine pipelle was inserted into the uterus to a level of 6.  The patient tolerated with above  procedure well.        ASSESSMENT:   Postmenopausal bleeding  History of breast cancer  Patient on Tamoxifen    PLAN:   Follow up endometrial biopsy  Follow up pelvic ultrasound

## 2018-06-29 ENCOUNTER — HOSPITAL ENCOUNTER (OUTPATIENT)
Dept: RADIOLOGY | Facility: OTHER | Age: 53
Discharge: HOME | End: 2018-06-29
Attending: OBSTETRICS & GYNECOLOGY
Payer: COMMERCIAL

## 2018-06-29 DIAGNOSIS — C50.919 MALIGNANT NEOPLASM OF FEMALE BREAST, UNSPECIFIED ESTROGEN RECEPTOR STATUS, UNSPECIFIED LATERALITY, UNSPECIFIED SITE OF BREAST: ICD-10-CM

## 2018-06-29 DIAGNOSIS — N95.0 POSTMENOPAUSAL BLEEDING: ICD-10-CM

## 2018-06-29 PROCEDURE — 76856 US EXAM PELVIC COMPLETE: CPT | Mod: 26,,, | Performed by: RADIOLOGY

## 2018-06-29 PROCEDURE — 76830 TRANSVAGINAL US NON-OB: CPT | Mod: TC

## 2018-06-29 PROCEDURE — 76830 TRANSVAGINAL US NON-OB: CPT | Mod: 26,,, | Performed by: RADIOLOGY

## 2018-06-30 ENCOUNTER — PATIENT MESSAGE (OUTPATIENT)
Dept: OBSTETRICS AND GYNECOLOGY | Facility: CLINIC | Age: 53
End: 2018-06-30

## 2018-07-02 PROCEDURE — 88305 TISSUE EXAM BY PATHOLOGIST: CPT | Performed by: PATHOLOGY

## 2018-07-02 PROCEDURE — 88305 TISSUE EXAM BY PATHOLOGIST: CPT | Mod: 26,,, | Performed by: PATHOLOGY

## 2018-07-16 ENCOUNTER — PATIENT MESSAGE (OUTPATIENT)
Dept: OBSTETRICS AND GYNECOLOGY | Facility: CLINIC | Age: 53
End: 2018-07-16

## 2018-07-25 DIAGNOSIS — C50.919 BREAST CANCER, STAGE 1, UNSPECIFIED LATERALITY: ICD-10-CM

## 2018-07-25 RX ORDER — TAMOXIFEN CITRATE 20 MG/1
TABLET ORAL
Qty: 90 TABLET | Refills: 3 | Status: SHIPPED | OUTPATIENT
Start: 2018-07-25 | End: 2018-12-31 | Stop reason: SDUPTHER

## 2018-08-07 ENCOUNTER — TELEPHONE (OUTPATIENT)
Dept: OBSTETRICS AND GYNECOLOGY | Facility: CLINIC | Age: 53
End: 2018-08-07

## 2018-08-12 ENCOUNTER — PATIENT MESSAGE (OUTPATIENT)
Dept: HEMATOLOGY/ONCOLOGY | Facility: CLINIC | Age: 53
End: 2018-08-12

## 2018-08-28 ENCOUNTER — PATIENT MESSAGE (OUTPATIENT)
Dept: OBSTETRICS AND GYNECOLOGY | Facility: CLINIC | Age: 53
End: 2018-08-28

## 2018-09-19 ENCOUNTER — PATIENT MESSAGE (OUTPATIENT)
Dept: OBSTETRICS AND GYNECOLOGY | Facility: CLINIC | Age: 53
End: 2018-09-19

## 2018-10-10 ENCOUNTER — PATIENT MESSAGE (OUTPATIENT)
Dept: OBSTETRICS AND GYNECOLOGY | Facility: CLINIC | Age: 53
End: 2018-10-10

## 2018-12-03 NOTE — PROGRESS NOTES
Subjective:       Patient ID: Astrid Mane is a 53 y.o. female.    Chief Complaint: No chief complaint on file.    HPI Mrs. Mane is a 53-year-old white female seen for  carcinoma of the right breast.  She has been on tamoxifen endocrine therapy.  Mammogram in April showed no evidence of malignancy.    Overall, she has been doing well.  Other than some plantar fasciitis she has no pain.  She is exercising on a regular basis doing weights and also joni.  Her weight has been stable.    Breast history:  Mammograms on February 23, 2015 showed an asymmetric density in the right breast which had become more conspicuous.  A biopsy was recommended.   On March 2 a needle biopsy showed infiltrating ductal carcinoma grade 2 with some intermediate grade DCIS.  The tumor was % ER positive, % TN positive, HER-2 was 2+ with negative FISH.  An MRI on March 6 showed a 1.1 cm lesion in the posterior right breast.  On March 23, A left breast biopsy for an abnormality noted at the 1:00 position showed focal adenosis and focal ductal hyperplasia without atypia.    On March 26 she underwent lumpectomy and sentinel lymph node biopsy which was performed by Dr. Ishmael Aguilera at [facility].  The pathology from that surgery revealed a  low grade infiltrating ductal carcinoma, histologic grade 2, nuclear grade 2, mitotic index 1, which measured 6 mm.  There was some associated DCIS. Margins were negative.  0/2 axillary lymph nodes were involved with tumor.     Final pathological stage TIb N0, stage IA.  An Oncotype score returned at 18 (low risk).    She completed radiation therapy on June 4, 2015 and started tamoxifen several weeks later.  Review of Systems   Constitutional: Negative for activity change, appetite change and unexpected weight change.         hot flashes   Respiratory: Negative for cough and shortness of breath.    Cardiovascular: Negative for chest pain.   Gastrointestinal: Negative for  abdominal pain and nausea.   Musculoskeletal: Negative for arthralgias and back pain.   Skin: Negative for rash.   Neurological: Negative for headaches.   Psychiatric/Behavioral: Negative for dysphoric mood. The patient is not nervous/anxious.        Objective:      Physical Exam   Constitutional: She appears well-developed and well-nourished. No distress.   Eyes: No scleral icterus.   Cardiovascular: Normal rate, regular rhythm and normal heart sounds.   Pulmonary/Chest: Effort normal and breath sounds normal. She has no wheezes. She has no rales. Right breast exhibits no mass, no nipple discharge and no skin change. Left breast exhibits no mass, no nipple discharge and no skin change.       Abdominal: Soft. She exhibits no mass. There is no tenderness.   Musculoskeletal: She exhibits no edema.   Skin: No rash noted. No erythema.   Psychiatric: She has a normal mood and affect. Her behavior is normal. Thought content normal.       Assessment:       1. Malignant neoplasm of upper-outer quadrant of right breast in female, estrogen receptor positive        Plan:         Continue tamoxifen  RTC in April with mammograms and blood work including FSH and estradiol.

## 2018-12-04 ENCOUNTER — OFFICE VISIT (OUTPATIENT)
Dept: HEMATOLOGY/ONCOLOGY | Facility: CLINIC | Age: 53
End: 2018-12-04
Payer: COMMERCIAL

## 2018-12-04 VITALS
RESPIRATION RATE: 18 BRPM | BODY MASS INDEX: 30.99 KG/M2 | TEMPERATURE: 98 F | OXYGEN SATURATION: 97 % | DIASTOLIC BLOOD PRESSURE: 54 MMHG | HEART RATE: 81 BPM | HEIGHT: 66 IN | SYSTOLIC BLOOD PRESSURE: 114 MMHG

## 2018-12-04 DIAGNOSIS — Z17.0 MALIGNANT NEOPLASM OF UPPER-OUTER QUADRANT OF RIGHT BREAST IN FEMALE, ESTROGEN RECEPTOR POSITIVE: Primary | ICD-10-CM

## 2018-12-04 DIAGNOSIS — C50.411 MALIGNANT NEOPLASM OF UPPER-OUTER QUADRANT OF RIGHT BREAST IN FEMALE, ESTROGEN RECEPTOR POSITIVE: Primary | ICD-10-CM

## 2018-12-04 PROCEDURE — 99999 PR PBB SHADOW E&M-EST. PATIENT-LVL III: CPT | Mod: PBBFAC,,, | Performed by: INTERNAL MEDICINE

## 2018-12-04 PROCEDURE — 99213 OFFICE O/P EST LOW 20 MIN: CPT | Mod: S$GLB,,, | Performed by: INTERNAL MEDICINE

## 2018-12-04 PROCEDURE — 3008F BODY MASS INDEX DOCD: CPT | Mod: CPTII,S$GLB,, | Performed by: INTERNAL MEDICINE

## 2018-12-31 ENCOUNTER — PATIENT MESSAGE (OUTPATIENT)
Dept: HEMATOLOGY/ONCOLOGY | Facility: CLINIC | Age: 53
End: 2018-12-31

## 2018-12-31 DIAGNOSIS — R23.2 HOT FLASHES DUE TO TAMOXIFEN: ICD-10-CM

## 2018-12-31 DIAGNOSIS — T45.1X5A HOT FLASHES DUE TO TAMOXIFEN: ICD-10-CM

## 2018-12-31 DIAGNOSIS — C50.919 BREAST CANCER, STAGE 1, UNSPECIFIED LATERALITY: ICD-10-CM

## 2018-12-31 RX ORDER — VENLAFAXINE HYDROCHLORIDE 75 MG/1
CAPSULE, EXTENDED RELEASE ORAL
Qty: 1.1112 CAPSULE | Refills: 0 | Status: SHIPPED | OUTPATIENT
Start: 2018-12-31 | End: 2019-05-15 | Stop reason: SDUPTHER

## 2018-12-31 RX ORDER — CLONIDINE HYDROCHLORIDE 0.1 MG/1
TABLET ORAL
Qty: 30 TABLET | Refills: 0 | Status: SHIPPED | OUTPATIENT
Start: 2018-12-31 | End: 2021-01-17 | Stop reason: SDUPTHER

## 2018-12-31 RX ORDER — VENLAFAXINE HYDROCHLORIDE 75 MG/1
CAPSULE, EXTENDED RELEASE ORAL
Qty: 30 CAPSULE | Refills: 0 | Status: SHIPPED | OUTPATIENT
Start: 2018-12-31 | End: 2018-12-31 | Stop reason: SDUPTHER

## 2018-12-31 RX ORDER — TAMOXIFEN CITRATE 20 MG/1
20 TABLET ORAL DAILY
Qty: 90 TABLET | Refills: 3 | Status: SHIPPED | OUTPATIENT
Start: 2018-12-31 | End: 2019-06-10 | Stop reason: SDUPTHER

## 2019-04-10 ENCOUNTER — PATIENT MESSAGE (OUTPATIENT)
Dept: HEMATOLOGY/ONCOLOGY | Facility: CLINIC | Age: 54
End: 2019-04-10

## 2019-04-10 DIAGNOSIS — C50.419 MALIGNANT NEOPLASM OF UPPER-OUTER QUADRANT OF FEMALE BREAST, UNSPECIFIED ESTROGEN RECEPTOR STATUS, UNSPECIFIED LATERALITY: Primary | ICD-10-CM

## 2019-04-30 NOTE — PROGRESS NOTES
Subjective:       Patient ID: Astrid Mane is a 53 y.o. female.    Chief Complaint: No chief complaint on file.    HPI Mrs. Mane is a 53-year-old white female seen for  carcinoma of the right breast.  She has been on tamoxifen endocrine therapy.  Lab last year was consistent with menopause.    Her major complaint is some fatigue but she is staying very active with exercise 5 for 6 days a week.  She had an endometrial biopsy last year for vaginal bleeding which was negative.  She has had some recurrent bleeding this year.  She continues to have some irritability.  She has no unusual pain or shortness of breath.    She is going to have a breast MRI next week.    Breast history:  Mammograms on February 23, 2015 showed an asymmetric density in the right breast which had become more conspicuous.  A biopsy was recommended.   On March 2 a needle biopsy showed infiltrating ductal carcinoma grade 2 with some intermediate grade DCIS.  The tumor was % ER positive, % MD positive, HER-2 was 2+ with negative FISH.  An MRI on March 6 showed a 1.1 cm lesion in the posterior right breast.  On March 23, A left breast biopsy for an abnormality noted at the 1:00 position showed focal adenosis and focal ductal hyperplasia without atypia.    On March 26 she underwent lumpectomy and sentinel lymph node biopsy which was performed by Dr. Ishmael Aguilera at [facility].  The pathology from that surgery revealed a  low grade infiltrating ductal carcinoma, histologic grade 2, nuclear grade 2, mitotic index 1, which measured 6 mm.  There was some associated DCIS. Margins were negative.  0/2 axillary lymph nodes were involved with tumor.     Final pathological stage TIb N0, stage IA.  An Oncotype score returned at 18 (low risk).    She completed radiation therapy on June 4, 2015 and started tamoxifen several weeks later.  Review of Systems   Constitutional: Negative for activity change, appetite change and  unexpected weight change.         hot flashes   Respiratory: Negative for cough and shortness of breath.    Cardiovascular: Negative for chest pain.   Gastrointestinal: Negative for abdominal pain and nausea.   Musculoskeletal: Negative for arthralgias and back pain.   Skin: Negative for rash.   Neurological: Negative for headaches.   Psychiatric/Behavioral: Negative for dysphoric mood. The patient is not nervous/anxious.        Objective:      Physical Exam   Constitutional: She appears well-developed and well-nourished. No distress.   Eyes: No scleral icterus.   Cardiovascular: Normal rate, regular rhythm and normal heart sounds.   Pulmonary/Chest: Effort normal and breath sounds normal. She has no wheezes. She has no rales. Right breast exhibits no mass, no nipple discharge and no skin change. Left breast exhibits no mass, no nipple discharge and no skin change.       Abdominal: Soft. She exhibits no mass. There is no tenderness.   Musculoskeletal: She exhibits no edema.   Skin: No rash noted. No erythema.   Psychiatric: She has a normal mood and affect. Her behavior is normal. Thought content normal.       Assessment:     mammogram-outside was unremarkable    1. Malignant neoplasm of upper-outer quadrant of right breast in female, estrogen receptor positive        Plan:       Continue tamoxifen .

## 2019-05-01 ENCOUNTER — LAB VISIT (OUTPATIENT)
Dept: LAB | Facility: HOSPITAL | Age: 54
End: 2019-05-01
Attending: INTERNAL MEDICINE
Payer: COMMERCIAL

## 2019-05-01 ENCOUNTER — OFFICE VISIT (OUTPATIENT)
Dept: HEMATOLOGY/ONCOLOGY | Facility: CLINIC | Age: 54
End: 2019-05-01
Payer: COMMERCIAL

## 2019-05-01 VITALS
BODY MASS INDEX: 30.99 KG/M2 | HEART RATE: 74 BPM | TEMPERATURE: 98 F | HEIGHT: 66 IN | DIASTOLIC BLOOD PRESSURE: 80 MMHG | SYSTOLIC BLOOD PRESSURE: 110 MMHG

## 2019-05-01 DIAGNOSIS — Z17.0 MALIGNANT NEOPLASM OF UPPER-OUTER QUADRANT OF RIGHT BREAST IN FEMALE, ESTROGEN RECEPTOR POSITIVE: Primary | ICD-10-CM

## 2019-05-01 DIAGNOSIS — C50.411 MALIGNANT NEOPLASM OF UPPER-OUTER QUADRANT OF RIGHT BREAST IN FEMALE, ESTROGEN RECEPTOR POSITIVE: Primary | ICD-10-CM

## 2019-05-01 DIAGNOSIS — C50.419 MALIGNANT NEOPLASM OF UPPER-OUTER QUADRANT OF FEMALE BREAST, UNSPECIFIED ESTROGEN RECEPTOR STATUS, UNSPECIFIED LATERALITY: ICD-10-CM

## 2019-05-01 LAB
ESTRADIOL SERPL-MCNC: <10 PG/ML
FSH SERPL-ACNC: 28 MIU/ML

## 2019-05-01 PROCEDURE — 83001 ASSAY OF GONADOTROPIN (FSH): CPT

## 2019-05-01 PROCEDURE — 99999 PR PBB SHADOW E&M-EST. PATIENT-LVL III: CPT | Mod: PBBFAC,,, | Performed by: INTERNAL MEDICINE

## 2019-05-01 PROCEDURE — 99213 OFFICE O/P EST LOW 20 MIN: CPT | Mod: S$GLB,,, | Performed by: INTERNAL MEDICINE

## 2019-05-01 PROCEDURE — 3008F PR BODY MASS INDEX (BMI) DOCUMENTED: ICD-10-PCS | Mod: CPTII,S$GLB,, | Performed by: INTERNAL MEDICINE

## 2019-05-01 PROCEDURE — 99213 PR OFFICE/OUTPT VISIT, EST, LEVL III, 20-29 MIN: ICD-10-PCS | Mod: S$GLB,,, | Performed by: INTERNAL MEDICINE

## 2019-05-01 PROCEDURE — 99999 PR PBB SHADOW E&M-EST. PATIENT-LVL III: ICD-10-PCS | Mod: PBBFAC,,, | Performed by: INTERNAL MEDICINE

## 2019-05-01 PROCEDURE — 3008F BODY MASS INDEX DOCD: CPT | Mod: CPTII,S$GLB,, | Performed by: INTERNAL MEDICINE

## 2019-05-01 PROCEDURE — 82670 ASSAY OF TOTAL ESTRADIOL: CPT

## 2019-05-01 PROCEDURE — 36415 COLL VENOUS BLD VENIPUNCTURE: CPT

## 2019-05-15 DIAGNOSIS — T45.1X5A HOT FLASHES DUE TO TAMOXIFEN: ICD-10-CM

## 2019-05-15 DIAGNOSIS — R23.2 HOT FLASHES DUE TO TAMOXIFEN: ICD-10-CM

## 2019-05-15 RX ORDER — VENLAFAXINE HYDROCHLORIDE 75 MG/1
75 CAPSULE, EXTENDED RELEASE ORAL DAILY
Qty: 30 CAPSULE | Refills: 11 | Status: SHIPPED | OUTPATIENT
Start: 2019-05-15 | End: 2020-05-04 | Stop reason: SDUPTHER

## 2019-05-15 NOTE — TELEPHONE ENCOUNTER
----- Message from Natalee Mann sent at 5/15/2019  8:10 AM CDT -----  Refill needed:  - venlafaxine (EFFEXOR-XR) 75 MG 24 hr capsule    Pharmacy   [facility] [id number] - [address] AT [facility] [phone] (Phone)  [phone] (Fax)    thank you

## 2019-06-08 ENCOUNTER — PATIENT MESSAGE (OUTPATIENT)
Dept: HEMATOLOGY/ONCOLOGY | Facility: CLINIC | Age: 54
End: 2019-06-08

## 2019-06-08 DIAGNOSIS — C50.919 BREAST CANCER, STAGE 1, UNSPECIFIED LATERALITY: ICD-10-CM

## 2019-06-10 DIAGNOSIS — C50.919 BREAST CANCER, STAGE 1, UNSPECIFIED LATERALITY: ICD-10-CM

## 2019-06-10 RX ORDER — TAMOXIFEN CITRATE 20 MG/1
20 TABLET ORAL DAILY
Qty: 90 TABLET | Refills: 3 | Status: CANCELLED | OUTPATIENT
Start: 2019-06-10

## 2019-06-10 RX ORDER — TAMOXIFEN CITRATE 20 MG/1
20 TABLET ORAL DAILY
Qty: 30 TABLET | Refills: 12 | Status: SHIPPED | OUTPATIENT
Start: 2019-06-10 | End: 2020-02-03

## 2019-09-10 ENCOUNTER — PATIENT MESSAGE (OUTPATIENT)
Dept: HEMATOLOGY/ONCOLOGY | Facility: CLINIC | Age: 54
End: 2019-09-10

## 2019-10-31 ENCOUNTER — OFFICE VISIT (OUTPATIENT)
Dept: HEMATOLOGY/ONCOLOGY | Facility: CLINIC | Age: 54
End: 2019-10-31
Payer: COMMERCIAL

## 2019-10-31 VITALS
HEART RATE: 73 BPM | OXYGEN SATURATION: 95 % | HEIGHT: 66 IN | RESPIRATION RATE: 18 BRPM | SYSTOLIC BLOOD PRESSURE: 121 MMHG | DIASTOLIC BLOOD PRESSURE: 79 MMHG | BODY MASS INDEX: 30.99 KG/M2 | TEMPERATURE: 98 F

## 2019-10-31 DIAGNOSIS — Z17.0 MALIGNANT NEOPLASM OF UPPER-OUTER QUADRANT OF RIGHT BREAST IN FEMALE, ESTROGEN RECEPTOR POSITIVE: Primary | ICD-10-CM

## 2019-10-31 DIAGNOSIS — C50.411 MALIGNANT NEOPLASM OF UPPER-OUTER QUADRANT OF RIGHT BREAST IN FEMALE, ESTROGEN RECEPTOR POSITIVE: Primary | ICD-10-CM

## 2019-10-31 PROCEDURE — 3008F BODY MASS INDEX DOCD: CPT | Mod: CPTII,S$GLB,, | Performed by: PHYSICIAN ASSISTANT

## 2019-10-31 PROCEDURE — 99999 PR PBB SHADOW E&M-EST. PATIENT-LVL III: ICD-10-PCS | Mod: PBBFAC,,, | Performed by: PHYSICIAN ASSISTANT

## 2019-10-31 PROCEDURE — 99213 OFFICE O/P EST LOW 20 MIN: CPT | Mod: S$GLB,,, | Performed by: PHYSICIAN ASSISTANT

## 2019-10-31 PROCEDURE — 99213 PR OFFICE/OUTPT VISIT, EST, LEVL III, 20-29 MIN: ICD-10-PCS | Mod: S$GLB,,, | Performed by: PHYSICIAN ASSISTANT

## 2019-10-31 PROCEDURE — 99999 PR PBB SHADOW E&M-EST. PATIENT-LVL III: CPT | Mod: PBBFAC,,, | Performed by: PHYSICIAN ASSISTANT

## 2019-10-31 PROCEDURE — 3008F PR BODY MASS INDEX (BMI) DOCUMENTED: ICD-10-PCS | Mod: CPTII,S$GLB,, | Performed by: PHYSICIAN ASSISTANT

## 2019-10-31 RX ORDER — ATORVASTATIN CALCIUM 20 MG/1
20 TABLET, FILM COATED ORAL DAILY
Refills: 11 | COMMUNITY
Start: 2019-10-24 | End: 2021-04-22 | Stop reason: SDUPTHER

## 2019-10-31 NOTE — Clinical Note
Srinivas in 6 monthsDana- can we call over to [facility] to make sure we get her most recent mammo report? thanks

## 2019-10-31 NOTE — PROGRESS NOTES
Subjective:       Patient ID: Astrid Mane is a 54 y.o. female.    Chief Complaint: Malignant neoplasm of upper-outer quadrant of right breast i    Mrs. Mane is a 54-year-old white female seen for  carcinoma of the right breast.  She has been on tamoxifen endocrine therapy.  Lab last year was consistent with menopause.    Patient with hot flashes, using Effexor for relief.  She continues to exercise regularly.     She has had no more recurrent vaginal bleeding- follows with GYN.  She has no unusual pain or shortness of breath.    She had a mammogram and breast US at [facility] earlier this week which was normal, those results are being sent to [facility].  She alternates mammo and MRI every 6 months.        Breast history:  Mammograms on February 23, 2015 showed an asymmetric density in the right breast which had become more conspicuous.  A biopsy was recommended.   On March 2 a needle biopsy showed infiltrating ductal carcinoma grade 2 with some intermediate grade DCIS.  The tumor was % ER positive, % SC positive, HER-2 was 2+ with negative FISH.  An MRI on March 6 showed a 1.1 cm lesion in the posterior right breast.  On March 23, A left breast biopsy for an abnormality noted at the 1:00 position showed focal adenosis and focal ductal hyperplasia without atypia.     On March 26 she underwent lumpectomy and sentinel lymph node biopsy which was performed by Dr. Ishmael Aguilera at [facility].  The pathology from that surgery revealed a  low grade infiltrating ductal carcinoma, histologic grade 2, nuclear grade 2, mitotic index 1, which measured 6 mm.  There was some associated DCIS. Margins were negative.  0/2 axillary lymph nodes were involved with tumor.      Final pathological stage TIb N0, stage IA.  An Oncotype score returned at 18 (low risk).     She completed radiation therapy on June 4, 2015 and started tamoxifen several weeks later.    Review of Systems   Constitutional: Negative  for appetite change and unexpected weight change.   Eyes: Negative for visual disturbance.   Respiratory: Positive for cough (dry cough during day). Negative for shortness of breath.    Cardiovascular: Negative for chest pain.   Gastrointestinal: Negative for abdominal pain and diarrhea.   Genitourinary: Negative for frequency.   Musculoskeletal: Negative for back pain.   Skin: Positive for rash.   Neurological: Negative for headaches.   Hematological: Negative for adenopathy.   Psychiatric/Behavioral: The patient is not nervous/anxious.        Objective:      Physical Exam   Constitutional: She is oriented to person, place, and time. She appears well-developed and well-nourished. No distress.   Presents alone   HENT:   Head: Normocephalic.   Mouth/Throat: Oropharynx is clear and moist. No oropharyngeal exudate.   Eyes: Pupils are equal, round, and reactive to light. Conjunctivae are normal. No scleral icterus.   Neck: Normal range of motion. Neck supple. No thyromegaly present.   Cardiovascular: Normal rate and regular rhythm.   Pulmonary/Chest: Effort normal and breath sounds normal. No respiratory distress.   Right breast with well healed lumpectomy incision with some underlying fibrosis. No mass, nodule or skin changes. Left breast without mass, nodule or skin changes. No axillary or supraclavicular adenopathy.   Lungs clear to ausculation   Abdominal: Soft. Bowel sounds are normal. She exhibits no distension and no mass. There is no tenderness.   Musculoskeletal: Normal range of motion. She exhibits no edema or tenderness.   No spinal or paraspinal tenderness to palpation     Lymphadenopathy:     She has no cervical adenopathy.   Neurological: She is alert and oriented to person, place, and time. No cranial nerve deficit.   Skin: Skin is warm and dry.   Psychiatric: She has a normal mood and affect. Her behavior is normal. Thought content normal.   Vitals reviewed.      Assessment:       1. Malignant neoplasm  of upper-outer quadrant of right breast in female, estrogen receptor positive        Plan:       Clinically doing well, she will remain on Tamoxifen until July 2020 which will complete 5 years of treatment.  All questions answered to satisfaction of patient and she knows to call in interim if any issues.

## 2020-01-25 ENCOUNTER — PATIENT MESSAGE (OUTPATIENT)
Dept: HEMATOLOGY/ONCOLOGY | Facility: CLINIC | Age: 55
End: 2020-01-25

## 2020-02-02 DIAGNOSIS — C50.919 BREAST CANCER, STAGE 1, UNSPECIFIED LATERALITY: ICD-10-CM

## 2020-02-03 RX ORDER — TAMOXIFEN CITRATE 20 MG/1
TABLET ORAL
Qty: 30 TABLET | Refills: 11 | Status: SHIPPED | OUTPATIENT
Start: 2020-02-03 | End: 2020-06-01

## 2020-03-19 ENCOUNTER — PATIENT MESSAGE (OUTPATIENT)
Dept: HEMATOLOGY/ONCOLOGY | Facility: CLINIC | Age: 55
End: 2020-03-19

## 2020-05-04 DIAGNOSIS — R23.2 HOT FLASHES DUE TO TAMOXIFEN: ICD-10-CM

## 2020-05-04 DIAGNOSIS — T45.1X5A HOT FLASHES DUE TO TAMOXIFEN: ICD-10-CM

## 2020-05-04 RX ORDER — VENLAFAXINE HYDROCHLORIDE 75 MG/1
75 CAPSULE, EXTENDED RELEASE ORAL DAILY
Qty: 30 CAPSULE | Refills: 11 | Status: SHIPPED | OUTPATIENT
Start: 2020-05-04 | End: 2021-05-07

## 2020-05-27 ENCOUNTER — PATIENT MESSAGE (OUTPATIENT)
Dept: HEMATOLOGY/ONCOLOGY | Facility: CLINIC | Age: 55
End: 2020-05-27

## 2020-06-01 DIAGNOSIS — C50.919 BREAST CANCER, STAGE 1, UNSPECIFIED LATERALITY: ICD-10-CM

## 2020-06-01 RX ORDER — TAMOXIFEN CITRATE 20 MG/1
TABLET ORAL
OUTPATIENT
Start: 2020-06-01

## 2020-06-01 RX ORDER — TAMOXIFEN CITRATE 20 MG/1
TABLET ORAL
Qty: 30 TABLET | Refills: 11 | Status: SHIPPED | OUTPATIENT
Start: 2020-06-01 | End: 2020-06-18

## 2020-08-03 NOTE — PROGRESS NOTES
Subjective:       Patient ID: Astrid Mane is a 55 y.o. female.    Chief Complaint: No chief complaint on file.    HPI Mrs. Mane is a 55-year-old white female seen for  carcinoma of the right breast.  She has been on tamoxifen endocrine therapy.    Mammogram in January was negative.    Today she reports she has been well.  Her major complaint is inability to lose weight.  She has been advising on regular move his.    Breast history:  Mammograms on February 23, 2015 showed an asymmetric density in the right breast which had become more conspicuous.  A biopsy was recommended.   On March 2 a needle biopsy showed infiltrating ductal carcinoma grade 2 with some intermediate grade DCIS.  The tumor was % ER positive, % CT positive, HER-2 was 2+ with negative FISH.  An MRI on March 6 showed a 1.1 cm lesion in the posterior right breast.  On March 23, A left breast biopsy for an abnormality noted at the 1:00 position showed focal adenosis and focal ductal hyperplasia without atypia.    On March 26 she underwent lumpectomy and sentinel lymph node biopsy which was performed by Dr. Ishmael Aguilera at [facility].  The pathology from that surgery revealed a  low grade infiltrating ductal carcinoma, histologic grade 2, nuclear grade 2, mitotic index 1, which measured 6 mm.  There was some associated DCIS. Margins were negative.  0/2 axillary lymph nodes were involved with tumor.     Final pathological stage TIb N0, stage IA.  An Oncotype score returned at 18 (low risk).    She completed radiation therapy on June 4, 2015 and started tamoxifen several weeks later.  Review of Systems   Constitutional: Negative for activity change, appetite change and unexpected weight change.         hot flashes   Respiratory: Negative for cough and shortness of breath.    Cardiovascular: Negative for chest pain.   Gastrointestinal: Negative for abdominal pain and nausea.   Musculoskeletal: Negative for arthralgias  and back pain.   Skin: Negative for rash.   Neurological: Negative for headaches.   Psychiatric/Behavioral: Negative for dysphoric mood. The patient is not nervous/anxious.        Objective:      Physical Exam  Constitutional:       General: She is not in acute distress.     Appearance: She is well-developed.   Eyes:      General: No scleral icterus.  Cardiovascular:      Rate and Rhythm: Normal rate and regular rhythm.      Heart sounds: Normal heart sounds.   Pulmonary:      Effort: Pulmonary effort is normal.      Breath sounds: Normal breath sounds. No wheezing or rales.   Chest:      Breasts:         Right: No mass, nipple discharge or skin change.         Left: No mass, nipple discharge or skin change.       Abdominal:      Palpations: Abdomen is soft. There is no mass.      Tenderness: There is no abdominal tenderness.   Skin:     Findings: No erythema or rash.   Psychiatric:         Behavior: Behavior normal.         Thought Content: Thought content normal.         Assessment:        1. Malignant neoplasm of upper-outer quadrant of right breast in female, estrogen receptor positive        Plan:       We discussed duration of therapy.  She will continue tamoxifen is so line recommended that we consider doing a breast cancer index.  In the meanwhile, she will continue tamoxifen.  Return to clinic 6 weeks.

## 2020-08-04 ENCOUNTER — OFFICE VISIT (OUTPATIENT)
Dept: HEMATOLOGY/ONCOLOGY | Facility: CLINIC | Age: 55
End: 2020-08-04
Payer: COMMERCIAL

## 2020-08-04 VITALS
DIASTOLIC BLOOD PRESSURE: 71 MMHG | SYSTOLIC BLOOD PRESSURE: 116 MMHG | RESPIRATION RATE: 18 BRPM | OXYGEN SATURATION: 95 % | HEART RATE: 69 BPM | HEIGHT: 66 IN | TEMPERATURE: 98 F | BODY MASS INDEX: 30.99 KG/M2

## 2020-08-04 DIAGNOSIS — C50.411 MALIGNANT NEOPLASM OF UPPER-OUTER QUADRANT OF RIGHT BREAST IN FEMALE, ESTROGEN RECEPTOR POSITIVE: Primary | ICD-10-CM

## 2020-08-04 DIAGNOSIS — Z17.0 MALIGNANT NEOPLASM OF UPPER-OUTER QUADRANT OF RIGHT BREAST IN FEMALE, ESTROGEN RECEPTOR POSITIVE: Primary | ICD-10-CM

## 2020-08-04 PROCEDURE — 3008F PR BODY MASS INDEX (BMI) DOCUMENTED: ICD-10-PCS | Mod: CPTII,S$GLB,, | Performed by: INTERNAL MEDICINE

## 2020-08-04 PROCEDURE — 99999 PR PBB SHADOW E&M-EST. PATIENT-LVL III: CPT | Mod: PBBFAC,,, | Performed by: INTERNAL MEDICINE

## 2020-08-04 PROCEDURE — 99999 PR PBB SHADOW E&M-EST. PATIENT-LVL III: ICD-10-PCS | Mod: PBBFAC,,, | Performed by: INTERNAL MEDICINE

## 2020-08-04 PROCEDURE — 3008F BODY MASS INDEX DOCD: CPT | Mod: CPTII,S$GLB,, | Performed by: INTERNAL MEDICINE

## 2020-08-04 PROCEDURE — 99213 OFFICE O/P EST LOW 20 MIN: CPT | Mod: S$GLB,,, | Performed by: INTERNAL MEDICINE

## 2020-08-04 PROCEDURE — 99213 PR OFFICE/OUTPT VISIT, EST, LEVL III, 20-29 MIN: ICD-10-PCS | Mod: S$GLB,,, | Performed by: INTERNAL MEDICINE

## 2020-08-25 ENCOUNTER — DOCUMENTATION ONLY (OUTPATIENT)
Dept: HEMATOLOGY/ONCOLOGY | Facility: CLINIC | Age: 55
End: 2020-08-25

## 2020-08-25 NOTE — PROGRESS NOTES
Breast Cancer Index - 3.3% risk of late recurrence with no benefit to extended endocrine therapt. Patioent informed.

## 2020-09-08 ENCOUNTER — TELEPHONE (OUTPATIENT)
Dept: OBSTETRICS AND GYNECOLOGY | Facility: CLINIC | Age: 55
End: 2020-09-08

## 2020-09-08 NOTE — TELEPHONE ENCOUNTER
----- Message from Gael Cortez sent at 9/8/2020  2:16 PM CDT -----  Regarding: Appointment  Contact: MARIA ESTHER LARIOS [[id number]]  Type:  Patient Returning Call    Who Called: MARIA ESTHER LARIOS [[id number]]    Who Left Message for Patient: Arlen    Does the patient know what this is regarding?: yes    Would the patient rather a call back or a response via [facility]? call    Best Call Back Number: [phone]    Additional Information: Would like to inform staff that she will be out of town next week on the dates giving. But can do next week Tuesday, Wednesday, or Friday. Please advise

## 2020-09-08 NOTE — TELEPHONE ENCOUNTER
Patient mentions mild vaginal spotting and hot flashes after stopping Tamoxifen. Patient is currently vacationing in [place]. Patient denies other symptoms such as fever, SOB, chest pain. Patient would like to schedule an appt next Friday with Dr. Ferrer to discuss her symptoms further. Appt scheduled for Friday 9/18/2020 at 1430. MINA Ricci.

## 2020-09-08 NOTE — TELEPHONE ENCOUNTER
RN lvm for patient regarding appt request for PMB on Tamoxifen. RN requested return call/portal message to schedule appt expediously in clinic. MINA Ricci.

## 2020-09-17 ENCOUNTER — TELEPHONE (OUTPATIENT)
Dept: OBSTETRICS AND GYNECOLOGY | Facility: CLINIC | Age: 55
End: 2020-09-17

## 2020-09-17 NOTE — TELEPHONE ENCOUNTER
----- Message from Silver Garber sent at 9/17/2020  1:18 PM CDT -----  Name of Who is Calling: MARIA ESTHER LARIOS [[id number]]    What is the request in detail: patient is requesting a call  back regards to rescheduling her appt The Patient is stuck in [place] ......Please contact to further discuss and advise      Can the clinic reply by [facility]: no     [facility]  [phone] (home)

## 2020-09-18 ENCOUNTER — TELEPHONE (OUTPATIENT)
Dept: HEMATOLOGY/ONCOLOGY | Facility: CLINIC | Age: 55
End: 2020-09-18

## 2020-09-18 NOTE — TELEPHONE ENCOUNTER
I called and spoke with patient regarding missed appt today, 09/18/20, and pt stated that she was is stuck in [place].  She wants to reschedule appt to Oct 2 around the something in the afternoon.     P.S. I did give Arlen the message and she will schedule pt appt fot that month and day.

## 2020-09-22 ENCOUNTER — TELEPHONE (OUTPATIENT)
Dept: OBSTETRICS AND GYNECOLOGY | Facility: CLINIC | Age: 55
End: 2020-09-22

## 2020-09-22 DIAGNOSIS — N95.0 PMB (POSTMENOPAUSAL BLEEDING): Primary | ICD-10-CM

## 2020-09-22 NOTE — TELEPHONE ENCOUNTER
Patient contacted for appt scheduling, return contact information provided, requesting return call. MINA Ricci.

## 2020-09-22 NOTE — TELEPHONE ENCOUNTER
----- Message from Altagracia Hernández sent at 9/22/2020  1:45 PM CDT -----  Regarding: Patient call back    Type:  Patient Returning Call    Who Called: MARIA ESTHER LARIOS [[id number]]    Who Left Message for Patient:  Arlen Olivas    Does the patient know what this is regarding?: Y    Can the clinic reply in [facility]: No    Best Call Back Number: [phone]    Additional Information: N/A

## 2020-09-22 NOTE — TELEPHONE ENCOUNTER
Patient c/o 2 weeks PMB off Tamoxifen. Prior hx of PMB with negative EMB's. Pelvic US scheduled per protocol with clinic appt scheduled for Dr. Ferrer 10/08/2020. Preparation for Pelvic US reviewed. Patient denies SOB, Chest pain, dizziness. No signs of anemia currently. Patient experienced heavier bleeding first week, bleeding is light this week. Denies pelvic pain, afebrile. MINA Ricci.

## 2020-09-23 ENCOUNTER — HOSPITAL ENCOUNTER (OUTPATIENT)
Dept: RADIOLOGY | Facility: OTHER | Age: 55
Discharge: HOME | End: 2020-09-23
Attending: OBSTETRICS & GYNECOLOGY
Payer: COMMERCIAL

## 2020-09-23 DIAGNOSIS — N95.0 PMB (POSTMENOPAUSAL BLEEDING): ICD-10-CM

## 2020-09-23 PROCEDURE — 76830 US PELVIS COMP WITH TRANSVAG NON-OB (XPD): ICD-10-PCS | Mod: 26,,, | Performed by: RADIOLOGY

## 2020-09-23 PROCEDURE — 76856 US PELVIS COMP WITH TRANSVAG NON-OB (XPD): ICD-10-PCS | Mod: 26,,, | Performed by: RADIOLOGY

## 2020-09-23 PROCEDURE — 76830 TRANSVAGINAL US NON-OB: CPT | Mod: TC

## 2020-09-23 PROCEDURE — 76830 TRANSVAGINAL US NON-OB: CPT | Mod: 26,,, | Performed by: RADIOLOGY

## 2020-09-23 PROCEDURE — 76856 US EXAM PELVIC COMPLETE: CPT | Mod: 26,,, | Performed by: RADIOLOGY

## 2020-09-24 ENCOUNTER — TELEPHONE (OUTPATIENT)
Dept: OBSTETRICS AND GYNECOLOGY | Facility: CLINIC | Age: 55
End: 2020-09-24

## 2020-09-24 NOTE — NURSING
Delayed Entry: Telephone Encounter 09/23/20 at 1513    Patient phoned with US reviewed. Patient desires to see Dr. Ferrer regarding US results and upcoming recommendations for PMB. Patient notes a hx of normal EMB's.     Patient's Breast Cancer Index - 3.3% risk of late recurrence with no benefit to extended endocrine therapy.     Patient notes she has completed Tamoxifen therapy. Now PMB x 2 weeks. Lighter this week.    Plan of care discussed including potential for EMB in the office.     Active listening and support provided. Patient's  has recurrent prostate cancer, pending surgery. Daughter recently diagnosed with COVID-19 and is out of state. Patient reports anxiety and uncertainty. Situational stressors. 20 minutes in counseling provided. Will re-assess in clinic to direct referrals as appropriate.     Appt re-scheduled from 08/01/2020 to tomorrow 09/25/2020 at 0900 for Dr. Ferrer. All questions and concerns addressed to patient's satisfaction. MINA Ricci.

## 2020-09-25 ENCOUNTER — OFFICE VISIT (OUTPATIENT)
Dept: OBSTETRICS AND GYNECOLOGY | Facility: CLINIC | Age: 55
End: 2020-09-25
Attending: OBSTETRICS & GYNECOLOGY
Payer: COMMERCIAL

## 2020-09-25 VITALS — WEIGHT: 192 LBS | BODY MASS INDEX: 30.86 KG/M2 | HEIGHT: 66 IN | RESPIRATION RATE: 18 BRPM

## 2020-09-25 DIAGNOSIS — Z79.810 USE OF TAMOXIFEN (NOLVADEX): ICD-10-CM

## 2020-09-25 DIAGNOSIS — Z12.4 SCREENING FOR CERVICAL CANCER: Primary | ICD-10-CM

## 2020-09-25 DIAGNOSIS — C50.919 MALIGNANT NEOPLASM OF BREAST IN FEMALE, ESTROGEN RECEPTOR POSITIVE, UNSPECIFIED LATERALITY, UNSPECIFIED SITE OF BREAST: ICD-10-CM

## 2020-09-25 DIAGNOSIS — N95.0 POSTMENOPAUSAL BLEEDING: ICD-10-CM

## 2020-09-25 DIAGNOSIS — Z17.0 MALIGNANT NEOPLASM OF BREAST IN FEMALE, ESTROGEN RECEPTOR POSITIVE, UNSPECIFIED LATERALITY, UNSPECIFIED SITE OF BREAST: ICD-10-CM

## 2020-09-25 PROCEDURE — 87624 HPV HI-RISK TYP POOLED RSLT: CPT

## 2020-09-25 PROCEDURE — 88175 CYTOPATH C/V AUTO FLUID REDO: CPT

## 2020-09-25 PROCEDURE — 3008F BODY MASS INDEX DOCD: CPT | Mod: CPTII,S$GLB,, | Performed by: OBSTETRICS & GYNECOLOGY

## 2020-09-25 PROCEDURE — 99214 PR OFFICE/OUTPT VISIT, EST, LEVL IV, 30-39 MIN: ICD-10-PCS | Mod: S$GLB,,, | Performed by: OBSTETRICS & GYNECOLOGY

## 2020-09-25 PROCEDURE — 3008F PR BODY MASS INDEX (BMI) DOCUMENTED: ICD-10-PCS | Mod: CPTII,S$GLB,, | Performed by: OBSTETRICS & GYNECOLOGY

## 2020-09-25 PROCEDURE — 99214 OFFICE O/P EST MOD 30 MIN: CPT | Mod: S$GLB,,, | Performed by: OBSTETRICS & GYNECOLOGY

## 2020-09-25 RX ORDER — SODIUM CHLORIDE 9 MG/ML
INJECTION, SOLUTION INTRAVENOUS CONTINUOUS
Status: CANCELLED | OUTPATIENT
Start: 2020-09-25

## 2020-09-25 NOTE — PROGRESS NOTES
"          History & Physical            OBGYN    C.C Post-Menopausal Bleeding      HPI : Astrid Mane is a 55 y.o. female  for evaluation and discussion of treatment options for Post-Menopausal Bleeding   . She reports that she had postmenopausal bleeding in .  At that time I recommended a D&C/ hysteroscopy.  She saw her regular gyn who recommended that it be followed.  She had additional bleeding in 2019 and had endometrial biopsy with her outside gyn and it was negative.  Most recently 1 week before stopping tamoxifen she had spotting.  Since  when she stopped tamoxifen she has had daily bleeding, and it has been heavy at times including passing clots    Past Medical History:   Diagnosis Date    Anxiety     Asthma     Breast cancer     right breast estrogen receptor positive    PMB (postmenopausal bleeding)     Right thyroid nodule     Vaginal atrophy      Past Surgical History:   Procedure Laterality Date    BREAST LUMPECTOMY      right breast     SECTION      total 3     Family History   Problem Relation Age of Onset    Melanoma Maternal Grandmother 70    Ovarian cancer Neg Hx     Breast cancer Neg Hx     Cancer Neg Hx     Colon cancer Neg Hx      Social History     Tobacco Use    Smoking status: Former Smoker     Types: Cigarettes    Smokeless tobacco: Former User   Substance Use Topics    Alcohol use: Yes    Drug use: No     OB History    Para Term  AB Living   3 3           SAB TAB Ectopic Multiple Live Births                  # Outcome Date GA Lbr Mrac/2nd Weight Sex Delivery Anes PTL Lv   3 Para            2 Para            1 Para                Resp 18   Ht 5' 6" (1.676 m)   Wt 87.1 kg (192 lb)   LMP 2015   BMI 30.99 kg/m²     ROS:    GENERAL: Denies weight gain or weight loss. Feeling well overall.   SKIN: Denies rash or lesions.   HEAD: Denies head injury or headache.   NODES: Denies enlarged lymph nodes.   CHEST: Denies chest pain " or shortness of breath.   CARDIOVASCULAR: Denies palpitations or left sided chest pain.   ABDOMEN: No abdominal pain, constipation, diarrhea, nausea, vomiting or rectal bleeding.   URINARY: No frequency, dysuria, hematuria, or burning on urination.  REPRODUCTIVE: +abfnormal bleeding  BREASTS: +breast cancer.   HEMATOLOGIC: No easy bruisability or excessive bleeding with the exception of menstrual cycles.  MUSCULOSKELETAL: Denies joint pain or swelling.   NEUROLOGIC: Denies syncope or weakness.   PSYCHIATRIC: Denies depression, anxiety or mood swings.    PHYSICAL EXAM:    APPEARANCE: Well nourished, well developed, in no acute distress.  AFFECT: WNL, alert and oriented x 3  SKIN: No acne or hirsutism  NECK: Neck symmetric without masses or thyromegaly  NODES: No inguinal, cervical, axillary, or femoral lymph node enlargement  CHEST: Good respiratory effect  ABDOMEN: Soft.  No tenderness or masses.  No hepatosplenomegaly.  No hernias.  PELVIC: Normal external genitalia without lesions.  Normal hair distribution.  Adequate perineal body, normal urethral meatus.  Vagina moist and well rugated without lesions or discharge.  Cervix pink, without lesions, discharge or tenderness.  No significant cystocele or rectocele.  Bimanual exam shows uterus to be normal size, regular, mobile and nontender.  Adnexa without masses or tenderness.    EXTREMITIES: No edema.    FINDINGS:  The uterus measures 12.0 cm in length by 7.0 x 7.8 cm in transverse dimension.  The endometrium measures 2-3 mm in thickness.     There is a heterogeneous hypoechoic focus in the posterior uterine fundus measuring 1.5 x 2.2 x 2.2 cm and a separate focus along the anterior uterine fundus measuring 1.7 x 2.0 x 2.0.     Ovaries not seen bilaterally..     No adnexal mass or free fluid in the pelvis.     Impression:     Hypoechoic foci within the uterine fundus suggestive for fibroids as detailed above     Ovaries not seen bilaterally.     Otherwise  unremarkable pelvic ultrasound specifically without evidence for adnexal mass or free fluid in pelvis.         ASSESSMENT & PLAN:    1.Postmenopausal bleeding  2. Breast cancer  3. Use of Tamoxifen  4.Screening for cervical cancer    - Liquid-Based Pap Smear, Screening  - HPV High Risk Genotypes, PCR      I have discussed the risks, benefits, indications, and alternatives of the procedure in detail.  The patient verbalizes her understanding.  All questions answered.  Consents signed.  The patient agrees to proceed to proceed as planned.

## 2020-09-25 NOTE — H&P (VIEW-ONLY)
"          History & Physical            OBGYN    C.C Post-Menopausal Bleeding      HPI : Astrid Mane is a 55 y.o. female  for evaluation and discussion of treatment options for Post-Menopausal Bleeding   . She reports that she had postmenopausal bleeding in .  At that time I recommended a D&C/ hysteroscopy.  She saw her regular gyn who recommended that it be followed.  She had additional bleeding in 2019 and had endometrial biopsy with her outside gyn and it was negative.  Most recently 1 week before stopping tamoxifen she had spotting.  Since  when she stopped tamoxifen she has had daily bleeding, and it has been heavy at times including passing clots    Past Medical History:   Diagnosis Date    Anxiety     Asthma     Breast cancer     right breast estrogen receptor positive    PMB (postmenopausal bleeding)     Right thyroid nodule     Vaginal atrophy      Past Surgical History:   Procedure Laterality Date    BREAST LUMPECTOMY      right breast     SECTION      total 3     Family History   Problem Relation Age of Onset    Melanoma Maternal Grandmother 70    Ovarian cancer Neg Hx     Breast cancer Neg Hx     Cancer Neg Hx     Colon cancer Neg Hx      Social History     Tobacco Use    Smoking status: Former Smoker     Types: Cigarettes    Smokeless tobacco: Former User   Substance Use Topics    Alcohol use: Yes    Drug use: No     OB History    Para Term  AB Living   3 3           SAB TAB Ectopic Multiple Live Births                  # Outcome Date GA Lbr Marc/2nd Weight Sex Delivery Anes PTL Lv   3 Para            2 Para            1 Para                Resp 18   Ht 5' 6" (1.676 m)   Wt 87.1 kg (192 lb)   LMP 2015   BMI 30.99 kg/m²     ROS:    GENERAL: Denies weight gain or weight loss. Feeling well overall.   SKIN: Denies rash or lesions.   HEAD: Denies head injury or headache.   NODES: Denies enlarged lymph nodes.   CHEST: Denies chest pain " or shortness of breath.   CARDIOVASCULAR: Denies palpitations or left sided chest pain.   ABDOMEN: No abdominal pain, constipation, diarrhea, nausea, vomiting or rectal bleeding.   URINARY: No frequency, dysuria, hematuria, or burning on urination.  REPRODUCTIVE: +abfnormal bleeding  BREASTS: +breast cancer.   HEMATOLOGIC: No easy bruisability or excessive bleeding with the exception of menstrual cycles.  MUSCULOSKELETAL: Denies joint pain or swelling.   NEUROLOGIC: Denies syncope or weakness.   PSYCHIATRIC: Denies depression, anxiety or mood swings.    PHYSICAL EXAM:    APPEARANCE: Well nourished, well developed, in no acute distress.  AFFECT: WNL, alert and oriented x 3  SKIN: No acne or hirsutism  NECK: Neck symmetric without masses or thyromegaly  NODES: No inguinal, cervical, axillary, or femoral lymph node enlargement  CHEST: Good respiratory effect  ABDOMEN: Soft.  No tenderness or masses.  No hepatosplenomegaly.  No hernias.  PELVIC: Normal external genitalia without lesions.  Normal hair distribution.  Adequate perineal body, normal urethral meatus.  Vagina moist and well rugated without lesions or discharge.  Cervix pink, without lesions, discharge or tenderness.  No significant cystocele or rectocele.  Bimanual exam shows uterus to be normal size, regular, mobile and nontender.  Adnexa without masses or tenderness.    EXTREMITIES: No edema.    FINDINGS:  The uterus measures 12.0 cm in length by 7.0 x 7.8 cm in transverse dimension.  The endometrium measures 2-3 mm in thickness.     There is a heterogeneous hypoechoic focus in the posterior uterine fundus measuring 1.5 x 2.2 x 2.2 cm and a separate focus along the anterior uterine fundus measuring 1.7 x 2.0 x 2.0.     Ovaries not seen bilaterally..     No adnexal mass or free fluid in the pelvis.     Impression:     Hypoechoic foci within the uterine fundus suggestive for fibroids as detailed above     Ovaries not seen bilaterally.     Otherwise  unremarkable pelvic ultrasound specifically without evidence for adnexal mass or free fluid in pelvis.         ASSESSMENT & PLAN:    1.Postmenopausal bleeding  2. Breast cancer  3. Use of Tamoxifen  4.Screening for cervical cancer    - Liquid-Based Pap Smear, Screening  - HPV High Risk Genotypes, PCR      I have discussed the risks, benefits, indications, and alternatives of the procedure in detail.  The patient verbalizes her understanding.  All questions answered.  Consents signed.  The patient agrees to proceed to proceed as planned.

## 2020-09-29 ENCOUNTER — PATIENT MESSAGE (OUTPATIENT)
Dept: OBSTETRICS AND GYNECOLOGY | Facility: CLINIC | Age: 55
End: 2020-09-29

## 2020-10-02 ENCOUNTER — TELEPHONE (OUTPATIENT)
Dept: OBSTETRICS AND GYNECOLOGY | Facility: CLINIC | Age: 55
End: 2020-10-02

## 2020-10-02 DIAGNOSIS — N95.0 PMB (POSTMENOPAUSAL BLEEDING): Primary | ICD-10-CM

## 2020-10-02 DIAGNOSIS — Z85.3 HISTORY OF BREAST CANCER: ICD-10-CM

## 2020-10-02 DIAGNOSIS — Z01.818 PREOPERATIVE TESTING: Primary | ICD-10-CM

## 2020-10-02 LAB
HPV HR 12 DNA SPEC QL NAA+PROBE: NEGATIVE
HPV16 AG SPEC QL: NEGATIVE
HPV18 DNA SPEC QL NAA+PROBE: NEGATIVE

## 2020-10-05 ENCOUNTER — TELEPHONE (OUTPATIENT)
Dept: OBSTETRICS AND GYNECOLOGY | Facility: CLINIC | Age: 55
End: 2020-10-05

## 2020-10-06 ENCOUNTER — PATIENT MESSAGE (OUTPATIENT)
Dept: OBSTETRICS AND GYNECOLOGY | Facility: CLINIC | Age: 55
End: 2020-10-06

## 2020-10-07 ENCOUNTER — TELEPHONE (OUTPATIENT)
Dept: INTERNAL MEDICINE | Facility: CLINIC | Age: 55
End: 2020-10-07

## 2020-10-07 NOTE — TELEPHONE ENCOUNTER
----- Message from Daphne Elizabeth sent at 10/6/2020 10:35 AM CDT -----  Regarding: pt  Type: Patient Call Back    Who called:pt    What is the request in detail:pt is requesting to reschedule medical clearance appt. Call pt    Can the clinic reply by [facility]?    Would the patient rather a call back or a response via [facility]? call    Best call back number:[phone] (home)       Additional Information:

## 2020-10-08 ENCOUNTER — TELEPHONE (OUTPATIENT)
Dept: INTERNAL MEDICINE | Facility: CLINIC | Age: 55
End: 2020-10-08

## 2020-10-08 NOTE — TELEPHONE ENCOUNTER
----- Message from Ro Madera MA sent at 10/6/2020  9:37 AM CDT -----  Good morning   This pt has a appointment with Dr Osborne for preop clearance on 10/21. We are moving her surgery up to 10/21 is there any way you can see her sooner so she can get cleared for surgery?   Thanks Ro

## 2020-10-08 NOTE — TELEPHONE ENCOUNTER
Dr. Osborne had nothing available sooner so patient scheduled an appointment with a different provider.

## 2020-10-12 ENCOUNTER — PATIENT MESSAGE (OUTPATIENT)
Dept: OBSTETRICS AND GYNECOLOGY | Facility: CLINIC | Age: 55
End: 2020-10-12

## 2020-10-12 ENCOUNTER — OFFICE VISIT (OUTPATIENT)
Dept: INTERNAL MEDICINE | Facility: CLINIC | Age: 55
End: 2020-10-12
Payer: COMMERCIAL

## 2020-10-12 VITALS
HEART RATE: 88 BPM | DIASTOLIC BLOOD PRESSURE: 88 MMHG | HEIGHT: 66 IN | OXYGEN SATURATION: 98 % | SYSTOLIC BLOOD PRESSURE: 136 MMHG | WEIGHT: 189 LBS | BODY MASS INDEX: 30.37 KG/M2

## 2020-10-12 DIAGNOSIS — E78.2 MIXED HYPERLIPIDEMIA: ICD-10-CM

## 2020-10-12 DIAGNOSIS — Z01.810 PREOPERATIVE CARDIOVASCULAR EXAMINATION: Primary | ICD-10-CM

## 2020-10-12 DIAGNOSIS — Z11.59 NEED FOR HEPATITIS C SCREENING TEST: ICD-10-CM

## 2020-10-12 DIAGNOSIS — Z11.4 SCREENING FOR HIV (HUMAN IMMUNODEFICIENCY VIRUS): ICD-10-CM

## 2020-10-12 DIAGNOSIS — J45.20 MILD INTERMITTENT ASTHMA WITHOUT COMPLICATION: ICD-10-CM

## 2020-10-12 DIAGNOSIS — E04.1 RIGHT THYROID NODULE: ICD-10-CM

## 2020-10-12 DIAGNOSIS — K63.5 POLYP OF COLON, UNSPECIFIED PART OF COLON, UNSPECIFIED TYPE: ICD-10-CM

## 2020-10-12 DIAGNOSIS — Z13.1 ENCOUNTER FOR SCREENING FOR DIABETES MELLITUS: ICD-10-CM

## 2020-10-12 PROBLEM — N95.0 POSTMENOPAUSAL BLEEDING: Status: ACTIVE | Noted: 2020-10-12

## 2020-10-12 PROCEDURE — 99205 PR OFFICE/OUTPT VISIT, NEW, LEVL V, 60-74 MIN: ICD-10-PCS | Mod: S$GLB,,, | Performed by: INTERNAL MEDICINE

## 2020-10-12 PROCEDURE — 99999 PR PBB SHADOW E&M-EST. PATIENT-LVL IV: ICD-10-PCS | Mod: PBBFAC,,, | Performed by: INTERNAL MEDICINE

## 2020-10-12 PROCEDURE — 3008F PR BODY MASS INDEX (BMI) DOCUMENTED: ICD-10-PCS | Mod: CPTII,S$GLB,, | Performed by: INTERNAL MEDICINE

## 2020-10-12 PROCEDURE — 99999 PR PBB SHADOW E&M-EST. PATIENT-LVL IV: CPT | Mod: PBBFAC,,, | Performed by: INTERNAL MEDICINE

## 2020-10-12 PROCEDURE — 99205 OFFICE O/P NEW HI 60 MIN: CPT | Mod: S$GLB,,, | Performed by: INTERNAL MEDICINE

## 2020-10-12 PROCEDURE — 3008F BODY MASS INDEX DOCD: CPT | Mod: CPTII,S$GLB,, | Performed by: INTERNAL MEDICINE

## 2020-10-12 NOTE — PROGRESS NOTES
Subjective:       Patient ID: Astrid Mane is a 55 y.o. female who  has a past medical history of Anxiety, Asthma, Breast cancer, PMB (postmenopausal bleeding), Right thyroid nodule, and Vaginal atrophy.    Chief Complaint: Establish Care and Pre-op Exam     History was obtained from the patient and supplemented through chart review  The patient has not seen a PCP in our system.  Was seeing Dr. Margret Roche at [facility].  -follows with oncology for history of breast cancer.  -Reviewed outside records from Dr. Roche for thyroid U/S.  -We are requesting records from [facility] for cscope    Her son in law is a  PCP with [facility] at the [place] location, [facility].    HPI    She presents today for preoperative cardiac risk stratification for hysteroscopy, D&C on 10/21/2020 for postmenopausal bleeding x 3 years.  She denies CP, SOB, LAUGHLIN.    Exercise: walks 4-5 miles/day, sometimes weights. The patient can perform housework and climb 6 flights of stairs without any difficulty.      There is no history of MI, CHF, or CVA.  No history of arrhythmia.  She is not on insulin.  No personal or family history of anesthetic complications other than nausea.  No history of seizures. H/o well controlled asthma.    RCRI score: 3.9 for major cardiac event.  She is a low risk patient for low risk surgery.    Asthma:  Flares 3/year.  Uses rescue inhaler 4/year.    Hypothyroidism, thyroid nodule:    OSH thyroid ultrasound  with 2 cm nodule in the lower lobe of the right lobe of the thyroid.  Tirads score 4, which is moderately suspicious.  0.5 cm nodule in the midpole of the right thyroid lobe, T rad score 3, mildly suspicious.  Also with 0.3 cm in the isthmus.  Recommended follow-up ultrasound in 12 months.  OSH FNA 5/2019 was benign.  Not on Synthroid.    Lab Results   Component Value Date    TSH 0.947 03/22/2018    FREET4 0.87 03/22/2018     HLD:  Is currently taking Lipitor 20  Lab Results   Component Value Date    LDLCALC  121.4 2017     The ASCVD Risk score ([place] KLAUDIA Jr., et al., 2013) failed to calculate for the following reasons:    Cannot find a previous HDL lab    Cannot find a previous total cholesterol lab    Colon polyp:  Had C scope 2 years ago with 1 polyp by Zeynep GUILLAUME, Dr. Avery.  Unsure when to repeat.              Not addressed today.  AR:  Causes headache.  Takes Mucinex, claritin daily.    Situational Anxiety:  On Xanax when flying.  Takes it 6 times a year.   reviewed.  Last filled .  She fills 10 tabs every few months.  Prescribed by her previous PCP, Dr. Margret Roche.    History of Breast cancer:  Follows with oncology.  S/p lumpectomy.  Completed radiation, tamoxifen.    Hot flashes:  Is on Clonidine 0.1, Effexor.  Follows with OBGYN.    Postmenopausal bleeding, fibroids:  x 3 years.  Following with OBGYN.  Planning on D&C, hysteroscopy.    Review of Systems   Constitutional: Negative for fever and unexpected weight change.   HENT: Positive for congestion. Negative for sneezing.    Eyes: Negative for redness and itching.   Respiratory: Negative for shortness of breath and wheezing.    Cardiovascular: Negative for chest pain, palpitations and leg swelling.   Gastrointestinal: Negative for abdominal pain, constipation and diarrhea.   Genitourinary: Negative for dysuria and hematuria.   Musculoskeletal: Negative for gait problem and joint swelling.   Skin: Negative for color change and rash.   Neurological: Negative for dizziness and light-headedness.   Hematological: Negative for adenopathy.   Psychiatric/Behavioral: Negative for confusion. The patient is not nervous/anxious.          Past Medical History:   Diagnosis Date    Anxiety     Asthma     Breast cancer     right breast estrogen receptor positive    PMB (postmenopausal bleeding)     Right thyroid nodule     Vaginal atrophy      Past Surgical History:   Procedure Laterality Date    BREAST LUMPECTOMY      right breast     SECTION    "   total 3     Family History   Problem Relation Age of Onset    Asthma Mother     Coronary artery disease Father         heavy tobacco    Melanoma Maternal Grandmother 70    Ovarian cancer Neg Hx     Breast cancer Neg Hx     Cancer Neg Hx     Colon cancer Neg Hx      Social History     Socioeconomic History    Marital status:      Spouse name: Not on file    Number of children: Not on file    Years of education: Not on file    Highest education level: Not on file   Occupational History    Not on file   Social Needs    Financial resource strain: Not on file    Food insecurity     Worry: Not on file     Inability: Not on file    Transportation needs     Medical: Not on file     Non-medical: Not on file   Tobacco Use    Smoking status: Former Smoker     Packs/day: 0.00     Years: 0.00     Pack years: 0.00     Types: Cigarettes     Quit date:      Years since quittin.    Smokeless tobacco: Former User    Tobacco comment: Have not smoked in 30 years   Substance and Sexual Activity    Alcohol use: Yes     Alcohol/week: 8.0 standard drinks     Types: 8 Glasses of wine per week    Drug use: No    Sexual activity: Yes     Partners: Male     Birth control/protection: None   Lifestyle    Physical activity     Days per week: Not on file     Minutes per session: Not on file    Stress: Not on file   Relationships    Social connections     Talks on phone: Not on file     Gets together: Not on file     Attends Anabaptism service: Not on file     Active member of club or organization: Not on file     Attends meetings of clubs or organizations: Not on file     Relationship status: Not on file   Other Topics Concern    Not on file   Social History Narrative    Not on file     Objective:      Vitals:    10/12/20 1411   BP: 136/88   Pulse: 88   SpO2: 98%   Weight: 85.7 kg (189 lb)   Height: 5' 6" (1.676 m)      Physical Exam  Constitutional:       General: She is not in acute distress.     " Appearance: She is well-developed. She is not diaphoretic.   HENT:      Head: Normocephalic and atraumatic.      Nose: Nose normal. No mucosal edema or rhinorrhea.      Mouth/Throat:      Pharynx: No oropharyngeal exudate.   Eyes:      General: No scleral icterus.        Right eye: No discharge.         Left eye: No discharge.   Neck:      Musculoskeletal: Neck supple.      Thyroid: No thyromegaly.      Trachea: No tracheal deviation.   Cardiovascular:      Rate and Rhythm: Normal rate and regular rhythm.      Heart sounds: Normal heart sounds. No murmur.   Pulmonary:      Effort: Pulmonary effort is normal. No respiratory distress.      Breath sounds: Normal breath sounds. No wheezing.   Abdominal:      General: Bowel sounds are normal. There is no distension.      Palpations: Abdomen is soft.      Tenderness: There is no abdominal tenderness.   Musculoskeletal:         General: No deformity.      Right lower leg: No edema.      Left lower leg: No edema.   Lymphadenopathy:      Cervical: No cervical adenopathy.   Skin:     General: Skin is warm and dry.      Findings: No erythema.   Neurological:      Mental Status: She is alert.      Cranial Nerves: No cranial nerve deficit.      Gait: Gait normal.   Psychiatric:         Behavior: Behavior normal.           Lab Results   Component Value Date    WBC 4.12 09/26/2017    HGB 13.9 09/26/2017    HCT 39.9 09/26/2017     09/26/2017    CHOL 212 (H) 09/26/2017    TRIG 98 09/26/2017    HDL 71 09/26/2017    ALT 26 09/26/2017    AST 22 09/26/2017     09/26/2017    K 4.1 09/26/2017     09/26/2017    CREATININE 0.8 09/26/2017    BUN 12 09/26/2017    CO2 27 09/26/2017    TSH 0.947 03/22/2018       The ASCVD Risk score ([place] KLAUDIA Jr., et al., 2013) failed to calculate for the following reasons:    Cannot find a previous HDL lab    Cannot find a previous total cholesterol lab    (Imaging have been independently reviewed)  Pelvic ultrasound with uterine fibroids.   No adnexal mass.    Assessment:       1. Preoperative cardiovascular examination    2. Mild intermittent asthma without complication    3. Right thyroid nodule    4. Mixed hyperlipidemia    5. Polyp of colon, unspecified part of colon, unspecified type    6. Encounter for screening for diabetes mellitus    7. Need for hepatitis C screening test    8. Screening for HIV (human immunodeficiency virus)          Plan:       Astrid was seen today for establish care and pre-op exam.    Diagnoses and all orders for this visit:    Preoperative cardiovascular examination  Comments:  RCRI 3.9%. >4 METS. Low risk pt. No further studies needed pending labs.  Orders:  -     Cancel: CBC auto differential; Future  -     Cancel: Comprehensive Metabolic Panel; Future  -     CBC auto differential; Future  -     CBC auto differential  -     Comprehensive Metabolic Panel; Future  -     Comprehensive Metabolic Panel    Mild intermittent asthma without complication  Comments:  Doing well.  Continue rescue inhaler p.r.n..    Right thyroid nodule  Comments:  Ultrasound  with suspicious lesions. FNA 2019 benign.  Recheck TSH, thyroid ultrasound to monitor.  Not on Synthroid.  Orders:  -     US Soft Tissue Head Neck Thyroid; Future  -     Cancel: TSH; Future  -     TSH; Future  -     TSH    Mixed hyperlipidemia  Comments:  Cont Lipitor 20. Check FLP.  Orders:  -     Cancel: Lipid Panel; Future  -     Lipid Panel; Future  -     Lipid Panel    Polyp of colon, unspecified part of colon, unspecified type  Comments:  C scope 2 years ago.  Unsure when to repeat.  Requesting records from [facility].    Encounter for screening for diabetes mellitus  -     Cancel: Hemoglobin A1C; Future  -     Hemoglobin A1C; Future  -     Hemoglobin A1C    Need for hepatitis C screening test  -     Cancel: Hepatitis C Antibody; Future  -     Hepatitis C Antibody; Future  -     Hepatitis C Antibody    Screening for HIV (human immunodeficiency virus)  -      Cancel: HIV 1/2 Ag/Ab (4th Gen); Future  -     HIV 1/2 Ag/Ab (4th Gen); Future  -     HIV 1/2 Ag/Ab (4th Gen)    Other orders  -     Cancel: Ambulatory referral/consult to Endocrine Surgery; Future  -     Cancel: Ambulatory referral/consult to Gastroenterology; Future  -     Cancel: Fecal Immunochemical Test (iFOBT); Future         Side effects of medication(s) were discussed in detail and patient voiced understanding.  Patient will call back for any issues or complications.     RTC in 4 month(s) or sooner PRN for annual.

## 2020-10-13 PROBLEM — N95.2 ATROPHIC VAGINITIS: Status: ACTIVE | Noted: 2020-09-10

## 2020-10-13 PROBLEM — N95.9 MENOPAUSAL AND POSTMENOPAUSAL DISORDER: Status: ACTIVE | Noted: 2020-10-13

## 2020-10-13 PROBLEM — Z85.3 PERSONAL HISTORY OF MALIGNANT NEOPLASM OF BREAST: Status: ACTIVE | Noted: 2020-09-10

## 2020-10-13 PROBLEM — O36.0990 RHESUS ISOIMMUNIZATION AFFECTING PREGNANCY: Status: ACTIVE | Noted: 2020-09-10

## 2020-10-15 ENCOUNTER — PATIENT OUTREACH (OUTPATIENT)
Dept: FAMILY MEDICINE | Facility: CLINIC | Age: 55
End: 2020-10-15

## 2020-10-15 ENCOUNTER — PATIENT MESSAGE (OUTPATIENT)
Dept: INTERNAL MEDICINE | Facility: CLINIC | Age: 55
End: 2020-10-15

## 2020-10-15 DIAGNOSIS — D70.9 NEUTROPENIA, UNSPECIFIED TYPE: ICD-10-CM

## 2020-10-15 PROBLEM — D72.819 LEUKOPENIA: Status: ACTIVE | Noted: 2020-10-15

## 2020-10-15 LAB
ALBUMIN: 4.5 GRAM/DL (ref 3.5–5)
ALP SERPL-CCNC: 73 UNIT/L (ref 38–126)
ALT SERPL W P-5'-P-CCNC: 35 UNIT/L (ref 7–56)
ANION GAP SERPL CALC-SCNC: 14 MEQ/L (ref 9–18)
AST SERPL-CCNC: 28 UNIT/L (ref 7–40)
BASOPHILS ABSOLUTE COUNT: 0 K/UL (ref 0–0.2)
BASOPHILS NFR BLD: 0.6 % (ref 0–2)
BILIRUB SERPL-MCNC: 0.3 MG/DL (ref 0–1.2)
BUN BLD-MCNC: 16 MG/DL (ref 7–21)
BUN/CREAT SERPL: 20 RATIO (ref 6–22)
CALC OSMOLALITY: 282 MOSM/KG (ref 275–295)
CALCIUM SERPL-MCNC: 9.1 MG/DL (ref 8.5–10.4)
CHLORIDE SERPL-SCNC: 104 MEQ/L (ref 98–107)
CHOL/HDLC RATIO: 2
CHOLEST SERPL-MSCNC: 176 MG/DL (ref 100–200)
CO2 SERPL-SCNC: 27 MEQ/L (ref 21–31)
CREAT SERPL-MCNC: 0.8 MG/DL (ref 0.5–1)
DIFFERENTIAL TYPE: ABNORMAL
EOSINOPHIL NFR BLD: 3.6 % (ref 0–4)
EOSINOPHILS ABSOLUTE COUNT: 0.1 K/UL (ref 0–0.7)
ERYTHROCYTE [DISTWIDTH] IN BLOOD BY AUTOMATED COUNT: 13 GRAM/DL (ref 12–15.3)
GFR: 69.4 ML/MIN/1.73M2
GLUCOSE SERPL-MCNC: 118 MG/DL (ref 70–100)
HBA1C MFR BLD: 5 % (ref 4.5–5.7)
HCT VFR BLD AUTO: 40.1 % (ref 37–47)
HCV AB S/CO SERPL IA: 0.01
HCV AB SERPL QL IA: NORMAL
HDLC SERPL-MCNC: 80 MG/DL (ref 40–75)
HGB BLD-MCNC: 13.3 GRAM/DL (ref 12–16)
HIV 1+2 AB+HIV1 P24 AG SERPL QL IA: NORMAL
LDLC SERPL CALC-MCNC: 86 MG/DL (ref 0–125)
LYMPHOCYTES %: 36.2 % (ref 15–45)
LYMPHOCYTES ABSOLUTE COUNT: 1.1 K/UL (ref 1–4.2)
MCH RBC QN AUTO: 30.3 PICOGRAM (ref 27–33)
MCHC RBC AUTO-ENTMCNC: 33.3 GRAM/DL (ref 32–36)
MCV RBC AUTO: 91.1 FEMTOLITER (ref 81–99)
MONOCYTES %: 7.4 % (ref 3–13)
MONOCYTES ABSOLUTE COUNT: 0.2 K/UL (ref 0.1–0.8)
NEUTROPHILS ABSOLUTE COUNT: 1.6 K/UL (ref 2.1–7.6)
NEUTROPHILS RELATIVE PERCENT: 52.2 % (ref 32–80)
NONHDLC SERPL-MCNC: 96 MG/DL (ref 60–125)
PLATELET # BLD AUTO: 263 K/UL (ref 150–350)
PMV BLD AUTO: 8.3 FEMTOLITER (ref 7–10.2)
POTASSIUM SERPL-SCNC: 4.7 MEQ/L (ref 3.5–5)
RBC # BLD AUTO: 4.41 MIL/UL (ref 4.2–5.4)
SODIUM BLD-SCNC: 140 MEQ/L (ref 135–145)
TOTAL PROTEIN: 6.8 GRAM/DL (ref 6.3–8.2)
TRIGL SERPL-MCNC: 62 MG/DL (ref 30–150)
TSH SERPL DL<=0.005 MIU/L-ACNC: 1.37 UIL/ML (ref 0.35–4)
WBC # BLD AUTO: 3 K/UL (ref 4.5–11)

## 2020-10-16 ENCOUNTER — TELEPHONE (OUTPATIENT)
Dept: OBSTETRICS AND GYNECOLOGY | Facility: CLINIC | Age: 55
End: 2020-10-16

## 2020-10-16 ENCOUNTER — PATIENT MESSAGE (OUTPATIENT)
Dept: OBSTETRICS AND GYNECOLOGY | Facility: CLINIC | Age: 55
End: 2020-10-16

## 2020-10-16 ENCOUNTER — PATIENT MESSAGE (OUTPATIENT)
Dept: SURGERY | Facility: OTHER | Age: 55
End: 2020-10-16

## 2020-10-16 DIAGNOSIS — G89.18 POSTOPERATIVE PAIN: Primary | ICD-10-CM

## 2020-10-16 LAB
FINAL PATHOLOGIC DIAGNOSIS: NORMAL
Lab: NORMAL

## 2020-10-16 RX ORDER — HYDROCODONE BITARTRATE AND ACETAMINOPHEN 5; 325 MG/1; MG/1
1 TABLET ORAL EVERY 6 HOURS PRN
Qty: 12 TABLET | Refills: 0 | Status: SHIPPED | OUTPATIENT
Start: 2020-10-16 | End: 2021-04-22

## 2020-10-18 ENCOUNTER — LAB VISIT (OUTPATIENT)
Dept: URGENT CARE | Facility: CLINIC | Age: 55
End: 2020-10-18
Payer: COMMERCIAL

## 2020-10-18 VITALS — TEMPERATURE: 98 F

## 2020-10-18 DIAGNOSIS — Z01.818 PREOPERATIVE TESTING: ICD-10-CM

## 2020-10-18 PROCEDURE — U0003 INFECTIOUS AGENT DETECTION BY NUCLEIC ACID (DNA OR RNA); SEVERE ACUTE RESPIRATORY SYNDROME CORONAVIRUS 2 (SARS-COV-2) (CORONAVIRUS DISEASE [COVID-19]), AMPLIFIED PROBE TECHNIQUE, MAKING USE OF HIGH THROUGHPUT TECHNOLOGIES AS DESCRIBED BY CMS-2020-01-R: HCPCS

## 2020-10-18 PROCEDURE — 99000 PR SPECIMEN HANDLING,DR OFF->LAB: ICD-10-PCS | Mod: S$GLB,,, | Performed by: OBSTETRICS & GYNECOLOGY

## 2020-10-18 PROCEDURE — 99000 SPECIMEN HANDLING OFFICE-LAB: CPT | Mod: S$GLB,,, | Performed by: OBSTETRICS & GYNECOLOGY

## 2020-10-19 ENCOUNTER — HOSPITAL ENCOUNTER (OUTPATIENT)
Dept: PREADMISSION TESTING | Facility: OTHER | Age: 55
Discharge: HOME | End: 2020-10-19
Attending: OBSTETRICS & GYNECOLOGY
Payer: COMMERCIAL

## 2020-10-19 ENCOUNTER — ANESTHESIA EVENT (OUTPATIENT)
Dept: SURGERY | Facility: OTHER | Age: 55
End: 2020-10-19
Payer: COMMERCIAL

## 2020-10-19 VITALS
DIASTOLIC BLOOD PRESSURE: 70 MMHG | WEIGHT: 189 LBS | HEIGHT: 66 IN | TEMPERATURE: 98 F | HEART RATE: 77 BPM | SYSTOLIC BLOOD PRESSURE: 134 MMHG | OXYGEN SATURATION: 99 % | BODY MASS INDEX: 30.37 KG/M2 | RESPIRATION RATE: 16 BRPM

## 2020-10-19 LAB — SARS-COV-2 RNA RESP QL NAA+PROBE: NOT DETECTED

## 2020-10-19 RX ORDER — SODIUM CHLORIDE, SODIUM LACTATE, POTASSIUM CHLORIDE, CALCIUM CHLORIDE 600; 310; 30; 20 MG/100ML; MG/100ML; MG/100ML; MG/100ML
INJECTION, SOLUTION INTRAVENOUS CONTINUOUS
Status: CANCELLED | OUTPATIENT
Start: 2020-10-19

## 2020-10-19 RX ORDER — PREGABALIN 50 MG/1
50 CAPSULE ORAL ONCE
Status: CANCELLED | OUTPATIENT
Start: 2020-10-19 | End: 2020-10-19

## 2020-10-19 RX ORDER — OMEPRAZOLE 10 MG/1
10 CAPSULE, DELAYED RELEASE ORAL DAILY
COMMUNITY
End: 2021-12-16

## 2020-10-19 RX ORDER — ACETAMINOPHEN 500 MG
1000 TABLET ORAL
Status: CANCELLED | OUTPATIENT
Start: 2020-10-19 | End: 2020-10-19

## 2020-10-19 RX ORDER — SCOLOPAMINE TRANSDERMAL SYSTEM 1 MG/1
1 PATCH, EXTENDED RELEASE TRANSDERMAL ONCE
Status: CANCELLED | OUTPATIENT
Start: 2020-10-19 | End: 2020-10-19

## 2020-10-19 RX ORDER — LIDOCAINE HYDROCHLORIDE 10 MG/ML
0.5 INJECTION, SOLUTION EPIDURAL; INFILTRATION; INTRACAUDAL; PERINEURAL ONCE
Status: CANCELLED | OUTPATIENT
Start: 2020-10-19 | End: 2020-10-19

## 2020-10-19 RX ORDER — FAMOTIDINE 20 MG/1
20 TABLET, FILM COATED ORAL
Status: CANCELLED | OUTPATIENT
Start: 2020-10-19 | End: 2020-10-19

## 2020-10-19 NOTE — DISCHARGE INSTRUCTIONS
Information to Prepare you for your Surgery    PRE-ADMIT TESTING -  [phone]    [address]  [place] [facility]          Your surgery has been scheduled at [facility]. We are pleased to have the opportunity to serve you. For Further Information please call [phone].    On the day of surgery please report to the Information Desk on the 1st floor.    · CONTACT YOUR PHYSICIAN'S OFFICE THE DAY PRIOR TO YOUR SURGERY TO OBTAIN YOUR ARRIVAL TIME.     · The evening before surgery do not eat anything after 9 p.m. ( this includes hard candy, chewing gum and mints).  You may only have GATORADE, POWERADE AND WATER  from 9 p.m. until you leave your home.   DO NOT DRINK ANY LIQUIDS ON THE WAY TO THE HOSPITAL.      SPECIAL MEDICATION INSTRUCTIONS: TAKE medications checked off by the Anesthesiologist on your Medication List.    Angiogram Patients: Take medications as instructed by your physician, including aspirin.     Surgery Patients:    If you take ASPIRIN - Your PHYSICIAN/SURGEON will need to inform you IF/OR when you need to stop taking aspirin prior to your surgery.     Do Not take any medications containing IBUPROFEN.  Do Not Wear any make-up or dark nail polish   (especially eye make-up) to surgery. If you come to surgery with makeup on you will be required to remove the makeup or nail polish.    Do not shave your surgical area at least 5 days prior to your surgery. The surgical prep will be performed at the hospital according to Infection Control regulations.    Leave all valuables at home.   Do Not wear any jewelry or watches, including any metal in body piercings. Jewelry must be removed prior to coming to the hospital.  There is a possibility that rings that are unable to be removed may be cut off if they are on the surgical extremity.    Contact Lens must be removed before surgery. Either do not wear the contact lens or bring a case and solution for  storage.  Please bring a container for eyeglasses or dentures as required.  Bring any paperwork your physician has provided, such as consent forms,  history and physicals, doctor's orders, etc.   Bring comfortable clothes that are loose fitting to wear upon discharge. Take into consideration the type of surgery being performed.  Maintain your diet as advised per your physician the day prior to surgery.      Adequate rest the night before surgery is advised.   Park in the Parking lot behind the hospital or in the [place] across the street from the parking lot. Parking is complimentary.  If you will be discharged the same day as your procedure, please arrange for a responsible adult to drive you home or to accompany you if traveling by taxi.   YOU WILL NOT BE PERMITTED TO DRIVE OR TO LEAVE THE HOSPITAL ALONE AFTER SURGERY.   If you are being discharged the same day, it is strongly recommended that you arrange for someone to remain with you for the first 24 hrs following your surgery.    The Surgeon will speak to your family/visitor after your surgery regarding the outcome of your surgery and post op care.  The Surgeon may speak to you after your surgery, but there is a possibility you may not remember the details.  Please check with your family members regarding the conversation with the Surgeon.    We strongly recommend whoever is bringing you home be present for discharge instructions.  This will ensure a thorough understanding for your post op home care.    ALL CHILDREN MUST ALWAYS BE ACCOMPANIED BY AN ADULT.    Visitors-Refer to current Visitor policy handouts.    Thank you for your cooperation.  The Staff of [facility].                Bathing Instructions with Hibiclens     Shower the evening before and morning of your procedure with Hibiclens:   Wash your face with water and your regular face wash/soap   Apply Hibiclens directly on your skin or on a wet washcloth and wash  gently. When showering: Move away from the shower stream when applying Hibiclens to avoid rinsing off too soon.   Rinse thoroughly with warm water   Do not dilute Hibiclens         Dry off as usual, do not use any deodorant, powder, body lotions, perfume, after shave or cologne.

## 2020-10-19 NOTE — ANESTHESIA PREPROCEDURE EVALUATION
10/19/2020  Astrid Mane is a 55 y.o., female.    Anesthesia Evaluation    I have reviewed the Patient Summary Reports.    I have reviewed the Nursing Notes. I have reviewed the NPO Status.   I have reviewed the Medications.     Review of Systems  Anesthesia Hx:  Denies Family Hx of Anesthesia complications.  Personal Hx of Anesthesia complications, Post-Operative Nausea/Vomiting, with every anesthetic, despite treatment   Social:  Former Smoker    Hematology/Oncology:  Hematology Normal       -- Cancer in past history:  Breast right   EENT/Dental:   chronic allergic rhinitis   Cardiovascular:   Denies Hypertension. (clonidine for hot flashes, not HTN)  hyperlipidemia    Pulmonary:   Asthma    Renal/:  Renal/ Normal     Hepatic/GI:   GERD (prn rx)    Musculoskeletal:  Musculoskeletal Normal    Neurological:  Neurology Normal    Endocrine:  Endocrine Normal    Dermatological:  Skin Normal    Psych:  Psychiatric Normal           Physical Exam  General:  Well nourished      Dental:  Dental Findings: In tact        Mental Status:  Mental Status Findings:  Cooperative, Alert and Oriented         Anesthesia Plan  Type of Anesthesia, risks & benefits discussed:  Anesthesia Type:  general  Patient's Preference:   Intra-op Monitoring Plan: standard ASA monitors  Intra-op Monitoring Plan Comments:   Post Op Pain Control Plan: per primary service following discharge from PACU and multimodal analgesia  Post Op Pain Control Plan Comments:   Induction:   IV  Beta Blocker:         Informed Consent: Patient understands risks and agrees with Anesthesia plan.  Questions answered. Anesthesia consent signed with patient.  ASA Score: 2     Day of Surgery Review of History & Physical:    H&P update referred to the surgeon.     Anesthesia Plan Notes: Recent labs WNL    Hx severe PONV, consider TIVA        Ready For  Surgery From Anesthesia Perspective.

## 2020-10-21 ENCOUNTER — PATIENT MESSAGE (OUTPATIENT)
Dept: SURGERY | Facility: OTHER | Age: 55
End: 2020-10-21

## 2020-10-21 ENCOUNTER — ANESTHESIA (OUTPATIENT)
Dept: SURGERY | Facility: OTHER | Age: 55
End: 2020-10-21
Payer: COMMERCIAL

## 2020-10-21 ENCOUNTER — HOSPITAL ENCOUNTER (OUTPATIENT)
Facility: OTHER | Age: 55
Discharge: HOME | End: 2020-10-21
Attending: OBSTETRICS & GYNECOLOGY | Admitting: OBSTETRICS & GYNECOLOGY
Payer: COMMERCIAL

## 2020-10-21 VITALS
HEART RATE: 67 BPM | TEMPERATURE: 98 F | BODY MASS INDEX: 30.37 KG/M2 | OXYGEN SATURATION: 95 % | WEIGHT: 189 LBS | HEIGHT: 66 IN | RESPIRATION RATE: 16 BRPM | DIASTOLIC BLOOD PRESSURE: 59 MMHG | SYSTOLIC BLOOD PRESSURE: 117 MMHG

## 2020-10-21 DIAGNOSIS — Z12.4 SCREENING FOR CERVICAL CANCER: ICD-10-CM

## 2020-10-21 DIAGNOSIS — N95.0 POSTMENOPAUSAL BLEEDING: ICD-10-CM

## 2020-10-21 PROCEDURE — 27201423 OPTIME MED/SURG SUP & DEVICES STERILE SUPPLY: Performed by: OBSTETRICS & GYNECOLOGY

## 2020-10-21 PROCEDURE — 88305 TISSUE EXAM BY PATHOLOGIST: ICD-10-PCS | Mod: 26,,, | Performed by: PATHOLOGY

## 2020-10-21 PROCEDURE — 58558 HYSTEROSCOPY BIOPSY: CPT | Mod: ,,, | Performed by: OBSTETRICS & GYNECOLOGY

## 2020-10-21 PROCEDURE — 71000016 HC POSTOP RECOV ADDL HR: Performed by: OBSTETRICS & GYNECOLOGY

## 2020-10-21 PROCEDURE — 63600175 PHARM REV CODE 636 W HCPCS: Performed by: ANESTHESIOLOGY

## 2020-10-21 PROCEDURE — 71000033 HC RECOVERY, INTIAL HOUR: Performed by: OBSTETRICS & GYNECOLOGY

## 2020-10-21 PROCEDURE — 71000015 HC POSTOP RECOV 1ST HR: Performed by: OBSTETRICS & GYNECOLOGY

## 2020-10-21 PROCEDURE — 88305 TISSUE EXAM BY PATHOLOGIST: CPT | Mod: 26,,, | Performed by: PATHOLOGY

## 2020-10-21 PROCEDURE — 63600175 PHARM REV CODE 636 W HCPCS: Performed by: NURSE ANESTHETIST, CERTIFIED REGISTERED

## 2020-10-21 PROCEDURE — 25000003 PHARM REV CODE 250: Performed by: ANESTHESIOLOGY

## 2020-10-21 PROCEDURE — 37000008 HC ANESTHESIA 1ST 15 MINUTES: Performed by: OBSTETRICS & GYNECOLOGY

## 2020-10-21 PROCEDURE — 58558 PR HYSTEROSCOPY,W/ENDO BX: ICD-10-PCS | Mod: ,,, | Performed by: OBSTETRICS & GYNECOLOGY

## 2020-10-21 PROCEDURE — 36000707: Performed by: OBSTETRICS & GYNECOLOGY

## 2020-10-21 PROCEDURE — 36000706: Performed by: OBSTETRICS & GYNECOLOGY

## 2020-10-21 PROCEDURE — 25000003 PHARM REV CODE 250: Performed by: NURSE ANESTHETIST, CERTIFIED REGISTERED

## 2020-10-21 PROCEDURE — 37000009 HC ANESTHESIA EA ADD 15 MINS: Performed by: OBSTETRICS & GYNECOLOGY

## 2020-10-21 PROCEDURE — 88305 TISSUE EXAM BY PATHOLOGIST: CPT | Performed by: PATHOLOGY

## 2020-10-21 RX ORDER — FENTANYL CITRATE 50 UG/ML
INJECTION, SOLUTION INTRAMUSCULAR; INTRAVENOUS
Status: DISCONTINUED | OUTPATIENT
Start: 2020-10-21 | End: 2020-10-21

## 2020-10-21 RX ORDER — HYDROCODONE BITARTRATE AND ACETAMINOPHEN 5; 325 MG/1; MG/1
1 TABLET ORAL EVERY 4 HOURS PRN
Status: DISCONTINUED | OUTPATIENT
Start: 2020-10-21 | End: 2020-10-21 | Stop reason: HOSPADM

## 2020-10-21 RX ORDER — KETAMINE HCL IN 0.9 % NACL 50 MG/5 ML
SYRINGE (ML) INTRAVENOUS
Status: DISCONTINUED | OUTPATIENT
Start: 2020-10-21 | End: 2020-10-21

## 2020-10-21 RX ORDER — DIPHENHYDRAMINE HYDROCHLORIDE 50 MG/ML
25 INJECTION INTRAMUSCULAR; INTRAVENOUS EVERY 6 HOURS PRN
Status: DISCONTINUED | OUTPATIENT
Start: 2020-10-21 | End: 2020-10-21 | Stop reason: HOSPADM

## 2020-10-21 RX ORDER — ONDANSETRON 8 MG/1
8 TABLET, ORALLY DISINTEGRATING ORAL EVERY 12 HOURS PRN
Qty: 20 TABLET | Refills: 0 | Status: SHIPPED | OUTPATIENT
Start: 2020-10-21 | End: 2021-12-16

## 2020-10-21 RX ORDER — OXYCODONE HYDROCHLORIDE 5 MG/1
5 TABLET ORAL
Status: DISCONTINUED | OUTPATIENT
Start: 2020-10-21 | End: 2020-10-21 | Stop reason: HOSPADM

## 2020-10-21 RX ORDER — TRAMADOL HYDROCHLORIDE 50 MG/1
50 TABLET ORAL ONCE
Status: COMPLETED | OUTPATIENT
Start: 2020-10-21 | End: 2020-10-21

## 2020-10-21 RX ORDER — ACETAMINOPHEN 500 MG
1000 TABLET ORAL
Status: COMPLETED | OUTPATIENT
Start: 2020-10-21 | End: 2020-10-21

## 2020-10-21 RX ORDER — ONDANSETRON 2 MG/ML
INJECTION INTRAMUSCULAR; INTRAVENOUS
Status: DISCONTINUED | OUTPATIENT
Start: 2020-10-21 | End: 2020-10-21

## 2020-10-21 RX ORDER — MIDAZOLAM HYDROCHLORIDE 1 MG/ML
INJECTION INTRAMUSCULAR; INTRAVENOUS
Status: DISCONTINUED | OUTPATIENT
Start: 2020-10-21 | End: 2020-10-21

## 2020-10-21 RX ORDER — ONDANSETRON 2 MG/ML
4 INJECTION INTRAMUSCULAR; INTRAVENOUS DAILY PRN
Status: DISCONTINUED | OUTPATIENT
Start: 2020-10-21 | End: 2020-10-21 | Stop reason: HOSPADM

## 2020-10-21 RX ORDER — PROPOFOL 10 MG/ML
VIAL (ML) INTRAVENOUS CONTINUOUS PRN
Status: DISCONTINUED | OUTPATIENT
Start: 2020-10-21 | End: 2020-10-21

## 2020-10-21 RX ORDER — DEXAMETHASONE SODIUM PHOSPHATE 4 MG/ML
INJECTION, SOLUTION INTRA-ARTICULAR; INTRALESIONAL; INTRAMUSCULAR; INTRAVENOUS; SOFT TISSUE
Status: DISCONTINUED | OUTPATIENT
Start: 2020-10-21 | End: 2020-10-21

## 2020-10-21 RX ORDER — FAMOTIDINE 20 MG/1
20 TABLET, FILM COATED ORAL
Status: COMPLETED | OUTPATIENT
Start: 2020-10-21 | End: 2020-10-21

## 2020-10-21 RX ORDER — SODIUM CHLORIDE 9 MG/ML
INJECTION, SOLUTION INTRAVENOUS CONTINUOUS
Status: DISCONTINUED | OUTPATIENT
Start: 2020-10-21 | End: 2020-10-21 | Stop reason: HOSPADM

## 2020-10-21 RX ORDER — DIPHENHYDRAMINE HYDROCHLORIDE 50 MG/ML
INJECTION INTRAMUSCULAR; INTRAVENOUS
Status: DISCONTINUED | OUTPATIENT
Start: 2020-10-21 | End: 2020-10-21

## 2020-10-21 RX ORDER — LIDOCAINE HYDROCHLORIDE 10 MG/ML
0.5 INJECTION, SOLUTION EPIDURAL; INFILTRATION; INTRACAUDAL; PERINEURAL ONCE
Status: DISCONTINUED | OUTPATIENT
Start: 2020-10-21 | End: 2020-10-21 | Stop reason: HOSPADM

## 2020-10-21 RX ORDER — PROPOFOL 10 MG/ML
VIAL (ML) INTRAVENOUS
Status: DISCONTINUED | OUTPATIENT
Start: 2020-10-21 | End: 2020-10-21

## 2020-10-21 RX ORDER — TRAMADOL HYDROCHLORIDE 50 MG/1
50 TABLET ORAL ONCE AS NEEDED
Status: DISCONTINUED | OUTPATIENT
Start: 2020-10-21 | End: 2020-10-21

## 2020-10-21 RX ORDER — PREGABALIN 50 MG/1
50 CAPSULE ORAL ONCE
Status: COMPLETED | OUTPATIENT
Start: 2020-10-21 | End: 2020-10-21

## 2020-10-21 RX ORDER — SODIUM CHLORIDE, SODIUM LACTATE, POTASSIUM CHLORIDE, CALCIUM CHLORIDE 600; 310; 30; 20 MG/100ML; MG/100ML; MG/100ML; MG/100ML
INJECTION, SOLUTION INTRAVENOUS CONTINUOUS
Status: DISCONTINUED | OUTPATIENT
Start: 2020-10-21 | End: 2020-10-21 | Stop reason: HOSPADM

## 2020-10-21 RX ORDER — MEPERIDINE HYDROCHLORIDE 25 MG/ML
12.5 INJECTION INTRAMUSCULAR; INTRAVENOUS; SUBCUTANEOUS ONCE AS NEEDED
Status: DISCONTINUED | OUTPATIENT
Start: 2020-10-21 | End: 2020-10-21 | Stop reason: HOSPADM

## 2020-10-21 RX ORDER — KETOROLAC TROMETHAMINE 30 MG/ML
INJECTION, SOLUTION INTRAMUSCULAR; INTRAVENOUS
Status: DISCONTINUED | OUTPATIENT
Start: 2020-10-21 | End: 2020-10-21

## 2020-10-21 RX ORDER — HYDROMORPHONE HYDROCHLORIDE 2 MG/ML
0.4 INJECTION, SOLUTION INTRAMUSCULAR; INTRAVENOUS; SUBCUTANEOUS EVERY 5 MIN PRN
Status: DISCONTINUED | OUTPATIENT
Start: 2020-10-21 | End: 2020-10-21 | Stop reason: HOSPADM

## 2020-10-21 RX ORDER — LIDOCAINE HCL/PF 100 MG/5ML
SYRINGE (ML) INTRAVENOUS
Status: DISCONTINUED | OUTPATIENT
Start: 2020-10-21 | End: 2020-10-21

## 2020-10-21 RX ORDER — DIPHENHYDRAMINE HCL 25 MG
25 CAPSULE ORAL EVERY 4 HOURS PRN
Status: DISCONTINUED | OUTPATIENT
Start: 2020-10-21 | End: 2020-10-21 | Stop reason: HOSPADM

## 2020-10-21 RX ORDER — SCOLOPAMINE TRANSDERMAL SYSTEM 1 MG/1
1 PATCH, EXTENDED RELEASE TRANSDERMAL ONCE
Status: COMPLETED | OUTPATIENT
Start: 2020-10-21 | End: 2020-10-21

## 2020-10-21 RX ORDER — SODIUM CHLORIDE 0.9 % (FLUSH) 0.9 %
3 SYRINGE (ML) INJECTION
Status: DISCONTINUED | OUTPATIENT
Start: 2020-10-21 | End: 2020-10-21 | Stop reason: HOSPADM

## 2020-10-21 RX ADMIN — FAMOTIDINE 20 MG: 20 TABLET ORAL at 05:10

## 2020-10-21 RX ADMIN — SCOLOPAMINE TRANSDERMAL SYSTEM 1 PATCH: 1 PATCH, EXTENDED RELEASE TRANSDERMAL at 05:10

## 2020-10-21 RX ADMIN — TRAMADOL HYDROCHLORIDE 50 MG: 50 TABLET, FILM COATED ORAL at 08:10

## 2020-10-21 RX ADMIN — DEXAMETHASONE SODIUM PHOSPHATE 8 MG: 4 INJECTION, SOLUTION INTRAMUSCULAR; INTRAVENOUS at 07:10

## 2020-10-21 RX ADMIN — MIDAZOLAM HYDROCHLORIDE 2 MG: 1 INJECTION, SOLUTION INTRAMUSCULAR; INTRAVENOUS at 06:10

## 2020-10-21 RX ADMIN — CARBOXYMETHYLCELLULOSE SODIUM 2 DROP: 2.5 SOLUTION/ DROPS OPHTHALMIC at 07:10

## 2020-10-21 RX ADMIN — DIPHENHYDRAMINE HYDROCHLORIDE 6.25 MG: 50 INJECTION, SOLUTION INTRAMUSCULAR; INTRAVENOUS at 07:10

## 2020-10-21 RX ADMIN — Medication 30 MG: at 07:10

## 2020-10-21 RX ADMIN — SODIUM CHLORIDE, SODIUM LACTATE, POTASSIUM CHLORIDE, AND CALCIUM CHLORIDE: 600; 310; 30; 20 INJECTION, SOLUTION INTRAVENOUS at 06:10

## 2020-10-21 RX ADMIN — ACETAMINOPHEN 1000 MG: 500 TABLET, FILM COATED ORAL at 05:10

## 2020-10-21 RX ADMIN — ONDANSETRON HYDROCHLORIDE 4 MG: 2 INJECTION INTRAMUSCULAR; INTRAVENOUS at 07:10

## 2020-10-21 RX ADMIN — LIDOCAINE HYDROCHLORIDE 80 MG: 20 INJECTION, SOLUTION INTRAVENOUS at 07:10

## 2020-10-21 RX ADMIN — PROPOFOL 200 MG: 10 INJECTION, EMULSION INTRAVENOUS at 07:10

## 2020-10-21 RX ADMIN — PROPOFOL 100 MCG/KG/MIN: 10 INJECTION, EMULSION INTRAVENOUS at 07:10

## 2020-10-21 RX ADMIN — GLYCOPYRROLATE 0.2 MG: 0.2 INJECTION, SOLUTION INTRAMUSCULAR; INTRAVITREAL at 07:10

## 2020-10-21 RX ADMIN — PREGABALIN 50 MG: 50 CAPSULE ORAL at 05:10

## 2020-10-21 RX ADMIN — KETOROLAC TROMETHAMINE 30 MG: 30 INJECTION, SOLUTION INTRAMUSCULAR; INTRAVENOUS at 07:10

## 2020-10-21 RX ADMIN — FENTANYL CITRATE 50 MCG: 50 INJECTION, SOLUTION INTRAMUSCULAR; INTRAVENOUS at 07:10

## 2020-10-21 NOTE — ANESTHESIA POSTPROCEDURE EVALUATION
Anesthesia Post Evaluation    Patient: Astrid Mane    Procedure(s) Performed: Procedure(s) (LRB):  HYSTEROSCOPY, WITH DILATION AND CURETTAGE OF UTERUS (N/A)    Final Anesthesia Type: general    Patient location during evaluation: PACU  Patient participation: Yes- Able to Participate  Level of consciousness: awake and alert  Post-procedure vital signs: reviewed and stable  Pain management: adequate  Airway patency: patent    PONV status at discharge: No PONV  Anesthetic complications: no      Cardiovascular status: blood pressure returned to baseline and stable  Respiratory status: unassisted, spontaneous ventilation and room air  Hydration status: euvolemic  Follow-up not needed.          Vitals Value Taken Time   /68 10/21/20 0830   Temp 36.6 °C (97.8 °F) 10/21/20 0830   Pulse 62 10/21/20 0834   Resp 16 10/21/20 0830   SpO2 100 % 10/21/20 0834   Vitals shown include unvalidated device data.      Event Time   Out of Recovery 08:36:33         Pain/Mera Score: Pain Rating Prior to Med Admin: 3 (10/21/2020  8:26 AM)  Pain Rating Post Med Admin: 3 (10/21/2020  8:33 AM)  Mera Score: 9 (10/21/2020  8:13 AM)

## 2020-10-21 NOTE — DISCHARGE SUMMARY
[facility]  Brief Operative Note    Surgery Date: 10/21/2020     Surgeon(s) and Role:     * Monet Ferrer MD - Primary    Assisting Surgeon: None    Pre-op Diagnosis:  PMB (postmenopausal bleeding) [N95.0]  History of breast cancer [Z85.3]    Post-op Diagnosis:  Post-Op Diagnosis Codes:     * PMB (postmenopausal bleeding) [N95.0]     * History of breast cancer [Z85.3]    Procedure(s) (LRB):  HYSTEROSCOPY, WITH DILATION AND CURETTAGE OF UTERUS (N/A)    Anesthesia: General    Hospital Course:  Patient presented and was brought to OR for scheduled hysteroscopy/D&C. Please see OP note for further details. Tolerated procedure well and patient was taken to recovery in a stable condition. Prior to discharge patient was able to void, ambulate, tolerate PO and pain was well controlled with PO meds. Patient was given routine post-op instructions for which patient voiced understanding. Patient was subsequently discharged home.    Estimated Blood Loss: minimal         Specimens:   Specimen (12h ago, onward)    Endometrial curettage            Discharge Note    OUTCOME: Patient tolerated treatment/procedure well without complication and is now ready for discharge.    DISPOSITION: Home or Self Care    FINAL DIAGNOSIS:  Postmenopausal bleeding    FOLLOWUP: In clinic    DISCHARGE INSTRUCTIONS:  No discharge procedures on file.     Clinical Reference Documents Added to Patient Instructions       Document    AFTER YOUR SURGERY: DISCHARGE INSTRUCTIONS (ENGLISH)           Medication List      ASK your doctor about these medications    ALPRAZolam 0.5 MG tablet  Commonly known as: XANAX     atorvastatin 20 MG tablet  Commonly known as: LIPITOR     cloNIDine 0.1 MG tablet  Commonly known as: CATAPRES  TK 1 T PO  QD     HYDROcodone-acetaminophen 5-325 mg per tablet  Commonly known as: NORCO  Take 1 tablet by mouth every 6 (six) hours as needed for Pain.     loratadine 10 mg tablet  Commonly known as: CLARITIN      MUCINEX COLD ORAL     omeprazole 10 MG capsule  Commonly known as: PRILOSEC     venlafaxine 75 MG 24 hr capsule  Commonly known as: EFFEXOR-XR  Take 1 capsule (75 mg total) by mouth once daily.          Sushma K. Reddy, MD  PGY-4, OBGYN

## 2020-10-21 NOTE — DISCHARGE INSTRUCTIONS

## 2020-10-21 NOTE — INTERVAL H&P NOTE
The patient has been examined and the H&P has been reviewed:    I concur with the findings and no changes have occurred since H&P was written.    Anesthesia risks, benefits and alternative options discussed and understood by patient/family.          Active Hospital Problems    Diagnosis  POA    Screening for cervical cancer [Z12.4]  Not Applicable      Resolved Hospital Problems   No resolved problems to display.

## 2020-10-21 NOTE — OP NOTE
Preoperative diagnosis  1.  Postmenopausal bleeding  2.  History of breast cancer  3.  Use of tamoxifen  4.  Thickened endometrial stripe    Postoperative diagnosis  Same    IV fluids 500 cc  Urine output 50 cc  Estimated blood loss minimal    After informed consent was obtained patient your general anesthesia was initiated.  She was placed in Yellofin stirrups and prepped and draped in the usual sterile fashion.  In-and out catheter was used to drain the bladder.   Right angle retractor was used to visualize the cervix.  Cervix grasped single-tooth tenaculum and was dilated to a 16.  Kun dilator.  Uterus sounded to 6 cm  Hysteroscope was introduced under normal saline medium  Multiple polyps were appreciated  Curettage was undertaken with return of moderate amount of tissue.  Polyps noted throughout the uterus  No evidence of perforation  Hysteroscopic fluid deficit was 150 cc  Patient tolerated procedure well and was taken recovery room in stable condition

## 2020-10-21 NOTE — PLAN OF CARE
Astrid Mane has met all discharge criteria from Phase II. Vital Signs are stable, ambulating  without difficulty.Pain is now under control and tolerable for the pt. Pain score 3/10 at this time.  Discharge instructions given, patient verbalized understanding. Discharged from facility via wheelchair in stable condition.

## 2020-10-21 NOTE — TRANSFER OF CARE
"Anesthesia Transfer of Care Note    Patient: Astrid Mane    Procedure(s) Performed: Procedure(s) (LRB):  HYSTEROSCOPY, WITH DILATION AND CURETTAGE OF UTERUS (N/A)    Patient location: PACU    Anesthesia Type: general    Transport from OR: Transported from OR on 2-3 L/min O2 by NC with adequate spontaneous ventilation    Post pain: adequate analgesia    Post assessment: no apparent anesthetic complications    Post vital signs: stable    Level of consciousness: awake and alert    Nausea/Vomiting: no nausea/vomiting    Complications: none    Transfer of care protocol was followed      Last vitals:   Visit Vitals  /76 (BP Location: Right arm, Patient Position: Sitting)   Pulse 74   Temp 35.8 °C (96.5 °F)   Resp 18   Ht 5' 6" (1.676 m)   Wt 85.7 kg (189 lb)   LMP 07/01/2015   SpO2 97%   Breastfeeding No   BMI 30.51 kg/m²     "

## 2020-10-21 NOTE — ANESTHESIA PROCEDURE NOTES
Intubation  Performed by: Dora Baer CRNA  Authorized by: Guilherme Crandall MD     Intubation:     Induction:  Intravenous    Intubated:  Postinduction    Mask Ventilation:  Easy mask    Attempts:  1    Attempted By:  CRNA    Method of Intubation:  Direct    Difficult Airway Encountered?: No      Complications:  None    Airway Device Size:  3.5    Style/Cuff Inflation:  Cuffed (inflated to minimal occlusive pressure)    Secured at:  The lips    Placement Verified By:  Capnometry    Complicating Factors:  None    Findings Post-Intubation:  BS equal bilateral and atraumatic/condition of teeth unchanged

## 2020-10-21 NOTE — INTERVAL H&P NOTE
250 joules The patient has been examined and the H&P has been reviewed:    I concur with the findings and no changes have occurred since H&P was written.    Surgery risks, benefits and alternative options discussed and understood by patient/family.          Active Hospital Problems    Diagnosis  POA    Screening for cervical cancer [Z12.4]  Not Applicable      Resolved Hospital Problems   No resolved problems to display.

## 2020-10-22 ENCOUNTER — TELEPHONE (OUTPATIENT)
Dept: OBSTETRICS AND GYNECOLOGY | Facility: CLINIC | Age: 55
End: 2020-10-22

## 2020-10-22 DIAGNOSIS — Z85.048 HISTORY OF COLORECTAL CANCER: ICD-10-CM

## 2020-10-22 DIAGNOSIS — R23.2 HOT FLASHES: Primary | ICD-10-CM

## 2020-10-26 LAB
FINAL PATHOLOGIC DIAGNOSIS: NORMAL
GROSS: NORMAL
Lab: NORMAL

## 2020-10-27 ENCOUNTER — PATIENT MESSAGE (OUTPATIENT)
Dept: HEMATOLOGY/ONCOLOGY | Facility: CLINIC | Age: 55
End: 2020-10-27

## 2020-11-09 ENCOUNTER — TELEPHONE (OUTPATIENT)
Dept: OBSTETRICS AND GYNECOLOGY | Facility: CLINIC | Age: 55
End: 2020-11-09

## 2020-11-09 ENCOUNTER — PATIENT MESSAGE (OUTPATIENT)
Dept: HEMATOLOGY/ONCOLOGY | Facility: CLINIC | Age: 55
End: 2020-11-09

## 2020-11-09 ENCOUNTER — PATIENT MESSAGE (OUTPATIENT)
Dept: OBSTETRICS AND GYNECOLOGY | Facility: CLINIC | Age: 55
End: 2020-11-09

## 2020-11-09 NOTE — TELEPHONE ENCOUNTER
RN phoned patient c/o vaginal bleeding s/p  D&C, Hysteroscopy 10/21/2020. Patient notes she was driving today from [place], noted one clot and heavier bleeding, some mild cramping upon standing and walking to the bathroom. Patient reported minimal bleeding initial days post-operatively. Patient reports no adverse symptoms such as fever, dizziness, SOB or hemorrhage. Complaint isolated to one incident. Reassurance and educations provided to patient. Patient instructed of reasons warranting ER versus phone call to our office for follow-up. Patient verbalizes understanding and expresses her gratitude. MINA Ricci.

## 2020-11-20 ENCOUNTER — PATIENT OUTREACH (OUTPATIENT)
Dept: FAMILY MEDICINE | Facility: CLINIC | Age: 55
End: 2020-11-20

## 2020-11-30 ENCOUNTER — PATIENT MESSAGE (OUTPATIENT)
Dept: OBSTETRICS AND GYNECOLOGY | Facility: CLINIC | Age: 55
End: 2020-11-30

## 2020-12-03 ENCOUNTER — PATIENT MESSAGE (OUTPATIENT)
Dept: INTERNAL MEDICINE | Facility: CLINIC | Age: 55
End: 2020-12-03

## 2020-12-03 ENCOUNTER — PATIENT MESSAGE (OUTPATIENT)
Dept: OBSTETRICS AND GYNECOLOGY | Facility: CLINIC | Age: 55
End: 2020-12-03

## 2020-12-03 DIAGNOSIS — D70.9 NEUTROPENIA, UNSPECIFIED TYPE: Primary | ICD-10-CM

## 2020-12-08 ENCOUNTER — OFFICE VISIT (OUTPATIENT)
Dept: OBSTETRICS AND GYNECOLOGY | Facility: CLINIC | Age: 55
End: 2020-12-08
Payer: COMMERCIAL

## 2020-12-08 VITALS
DIASTOLIC BLOOD PRESSURE: 80 MMHG | WEIGHT: 189 LBS | SYSTOLIC BLOOD PRESSURE: 113 MMHG | RESPIRATION RATE: 18 BRPM | BODY MASS INDEX: 30.37 KG/M2 | HEIGHT: 66 IN

## 2020-12-08 DIAGNOSIS — N95.2 ATROPHIC VAGINITIS: ICD-10-CM

## 2020-12-08 DIAGNOSIS — R30.0 DYSURIA: Primary | ICD-10-CM

## 2020-12-08 LAB
BILIRUB UR QL STRIP: NEGATIVE
CLARITY UR REFRACT.AUTO: CLEAR
COLOR UR AUTO: YELLOW
GLUCOSE UR QL STRIP: NEGATIVE
HGB UR QL STRIP: NEGATIVE
KETONES UR QL STRIP: NEGATIVE
LEUKOCYTE ESTERASE UR QL STRIP: NEGATIVE
NITRITE UR QL STRIP: NEGATIVE
PH UR STRIP: 7 [PH] (ref 5–8)
PROT UR QL STRIP: NEGATIVE
SP GR UR STRIP: 1.01 (ref 1–1.03)
URN SPEC COLLECT METH UR: NORMAL

## 2020-12-08 PROCEDURE — 87186 SC STD MICRODIL/AGAR DIL: CPT

## 2020-12-08 PROCEDURE — 81003 URINALYSIS AUTO W/O SCOPE: CPT

## 2020-12-08 PROCEDURE — 87088 URINE BACTERIA CULTURE: CPT

## 2020-12-08 PROCEDURE — 1125F PR PAIN SEVERITY QUANTIFIED, PAIN PRESENT: ICD-10-PCS | Mod: S$GLB,,, | Performed by: PHYSICIAN ASSISTANT

## 2020-12-08 PROCEDURE — 1125F AMNT PAIN NOTED PAIN PRSNT: CPT | Mod: S$GLB,,, | Performed by: PHYSICIAN ASSISTANT

## 2020-12-08 PROCEDURE — 99213 PR OFFICE/OUTPT VISIT, EST, LEVL III, 20-29 MIN: ICD-10-PCS | Mod: S$GLB,,, | Performed by: PHYSICIAN ASSISTANT

## 2020-12-08 PROCEDURE — 3008F BODY MASS INDEX DOCD: CPT | Mod: CPTII,S$GLB,, | Performed by: PHYSICIAN ASSISTANT

## 2020-12-08 PROCEDURE — 3008F PR BODY MASS INDEX (BMI) DOCUMENTED: ICD-10-PCS | Mod: CPTII,S$GLB,, | Performed by: PHYSICIAN ASSISTANT

## 2020-12-08 PROCEDURE — 87086 URINE CULTURE/COLONY COUNT: CPT

## 2020-12-08 PROCEDURE — 87077 CULTURE AEROBIC IDENTIFY: CPT

## 2020-12-08 PROCEDURE — 99213 OFFICE O/P EST LOW 20 MIN: CPT | Mod: S$GLB,,, | Performed by: PHYSICIAN ASSISTANT

## 2020-12-08 RX ORDER — NITROFURANTOIN 25; 75 MG/1; MG/1
100 CAPSULE ORAL 2 TIMES DAILY
Qty: 14 CAPSULE | Refills: 0 | Status: SHIPPED | OUTPATIENT
Start: 2020-12-08 | End: 2020-12-15

## 2020-12-08 RX ORDER — CLOBETASOL PROPIONATE 0.5 MG/G
OINTMENT TOPICAL 2 TIMES DAILY
Qty: 15 G | Refills: 0 | Status: SHIPPED | OUTPATIENT
Start: 2020-12-08 | End: 2021-04-22

## 2020-12-08 NOTE — PROGRESS NOTES
"Subjective:      Astrid Mane is a 55 y.o. female who presents for dysuria. Symptoms started yesterday with pain during urination. Pain is external and into the bladder. Denies urgency or frequency. Typical presentation for her for UTI. Denies fever, chills or nausea. No vaginal discharge or irritation. Not currently sexually active. Had D&C with Dr. Ferrer about 6 weeks ago. Had bleeding a couple weeks ago but since resolved. Bowels are regular.     Admission on 10/21/2020, Discharged on 10/21/2020   Component Date Value Ref Range Status    Final Pathologic Diagnosis 10/21/2020    Final                    Value:ENDOMETRIUM, CURETTAGE:  - Benign endometrial polyp.  - No hyperplasia, atypia, or malignancy.      Gross 10/21/2020    Final                    Value:PART 1  Surgery MRN:  [id number]; Pathology MRN:  [id number]  Received in formalin and designated as "endometrial curettage" are multiple  pink-tan to red-brown, soft pieces of tissue with intermixed blood clot  material, measuring 3.5 x 2.5 x 0.5 cm in aggregate.  The tissues are wrapped  in filter paper and are submitted entirely in a single cassette.  QCP--1-A  XIC--1-B  GROSS DICTATION:  Rahat Caraballo      Disclaimer 10/21/2020    Final                    Value:Unless the case is a 'gross only' or additional testing only, the final  diagnosis for each specimen is based on a microscopic examination of  appropriate tissue sections.     Lab Visit on 10/18/2020   Component Date Value Ref Range Status    SARS-CoV2 (COVID-19) Qualitative P* 10/18/2020 Not Detected  Not Detected Final   Office Visit on 10/12/2020   Component Date Value Ref Range Status    Hepatitis C Ab 10/14/2020 NON-REACTIVE  NON-REACTIVE Final    Signal/Cutoff 10/14/2020 0.01  <1.00 Final    Glucose 10/14/2020 118* 70 - 100 mg/dL Final    BUN 10/14/2020 16  7 - 21 mg/dL Final    Creatinine 10/14/2020 0.8  0.5 - 1.0 mg/dL Final    GFR 10/14/2020 69.4  >=60.0 " mL/min/1.73m2 Final    BUN/Creatinine Ratio 10/14/2020 20  6 - 22 Ratio Final    Sodium 10/14/2020 140  135 - 145 mEq/L Final    Potassium 10/14/2020 4.7  3.5 - 5.0 mEq/L Final    Chloride 10/14/2020 104  98 - 107 mEq/L Final    CO2 10/14/2020 27  21 - 31 mEq/L Final    Calcium 10/14/2020 9.1  8.5 - 10.4 mg/dL Final    Total Protein 10/14/2020 6.8  6.3 - 8.2 gram/dL Final    ALBUMIN 10/14/2020 4.5  3.5 - 5.0 gram/dL Final    Total Bilirubin 10/14/2020 0.3  0.0 - 1.2 mg/dL Final    Alkaline Phosphatase 10/14/2020 73  38 - 126 unit/L Final    AST 10/14/2020 28  7 - 40 unit/L Final    ALT 10/14/2020 35  7 - 56 unit/L Final    Calc Osmolality 10/14/2020 282  275 - 295 mOsm/kg Final    Anion Gap 10/14/2020 14  9 - 18 mEq/L Final    Hemoglobin A1C 10/14/2020 5.0  4.5 - 5.7 % Final    WBC 10/14/2020 3.0* 4.5 - 11.0 K/UL Final    RBC 10/14/2020 4.41  4.20 - 5.40 MIL/uL Final    Hemoglobin 10/14/2020 13.3  12.0 - 16.0 gram/dL Final    Hematocrit 10/14/2020 40.1  37.0 - 47.0 % Final    MCV 10/14/2020 91.1  81.0 - 99.0 Femtoliter Final    MCH 10/14/2020 30.3  27.0 - 33.0 Picogram Final    MCHC 10/14/2020 33.3  32.0 - 36.0 gram/dL Final    RDW 10/14/2020 13.0  12.0 - 15.3 gram/dL Final    Platelets 10/14/2020 263  150 - 350 K/UL Final    MPV 10/14/2020 8.3  7.0 - 10.2 Femtoliter Final    Differential Type 10/14/2020 Auto   Final    Neutrophils Relative 10/14/2020 52.2  32.0 - 80.0 % Final    Lymphocytes % 10/14/2020 36.2  15.0 - 45.0 % Final    Monocytes % 10/14/2020 7.4  3.0 - 13.0 % Final    Eosinophil % 10/14/2020 3.6  0.0 - 4.0 % Final    Basophil % 10/14/2020 0.6  0.0 - 2.0 % Final    Neutrophils Absolute 10/14/2020 1.6* 2.1 - 7.6 K/UL Final    Lymphocytes Absolute 10/14/2020 1.1  1.0 - 4.2 K/UL Final    Monocytes Absolute Count 10/14/2020 0.2  0.1 - 0.8 K/UL Final    Eosinophils Absolute 10/14/2020 0.1  0.0 - 0.7 K/UL Final    Basophils Absolute 10/14/2020 0.0  0.0 - 0.2 K/UL Final     HIV Ag/Ab 4th Gen 10/14/2020 NON-REACTIVE  NON-REACTIVE Final    Cholesterol 10/14/2020 176  100 - 200 mg/dL Final    Triglycerides 10/14/2020 62  30 - 150 mg/dL Final    HDL 10/14/2020 80* 40 - 75 mg/dL Final    LDL Calculated 10/14/2020 86  0 - 125 mg/dL Final    CHOL/HDLC Ratio 10/14/2020 2   Final    Non-HDL Cholesterol 10/14/2020 96  60 - 125 mg/dL Final    TSH 10/14/2020 1.37  0.35 - 4.00 uIL/mL Final   Office Visit on 09/25/2020   Component Date Value Ref Range Status    Final Pathologic Diagnosis 09/25/2020    Final                    Value:Specimen Adequacy  Satisfactory for interpretation.  Chandler Category  Negative for intraepithelial lesion or malignancy.  Atrophic smear.      Disclaimer 09/25/2020    Final                    Value:The Pap smear is a screening test that aids in the detection of cervical  cancer and cancer precursors. Both false positive and false negative results  can occur. The test should be used at regular intervals, and positive results  should be confirmed before definitive therapy.  This liquid based specimen is processed using the  or  Thin PrepPAP  System. This specimen has been analyzed by the ThinPrep Imaging System  ([employer]), an automated imaging and review system which assists  the laboratory in evaluating cells on ThinPrep PAP tests. Following automated  imaging, selected fields from every slide are reviewed by a cytotechnologist  and/or pathologist.      HPV other High Risk types, PCR 09/25/2020 Negative  Negative Final    HPV High Risk type 16, PCR 09/25/2020 Negative  Negative Final    HPV High Risk type 18, PCR 09/25/2020 Negative  Negative Final       Past Medical History:   Diagnosis Date    Anxiety     Asthma     Breast cancer     right breast estrogen receptor positive    PMB (postmenopausal bleeding)     PONV (postoperative nausea and vomiting)     Right thyroid nodule     Vaginal atrophy      Past Surgical History:    Procedure Laterality Date    BREAST LUMPECTOMY      right breast     SECTION      total 3    HYSTEROSCOPY WITH DILATION AND CURETTAGE OF UTERUS N/A 10/21/2020    Procedure: HYSTEROSCOPY, WITH DILATION AND CURETTAGE OF UTERUS;  Surgeon: Moent Ferrer MD;  [facility];  Service: OB/GYN;  Laterality: N/A;     Social History     Tobacco Use    Smoking status: Former Smoker     Packs/day: 0.00     Years: 0.00     Pack years: 0.00     Types: Cigarettes     Quit date:      Years since quittin.9    Smokeless tobacco: Former User    Tobacco comment: Have not smoked in 30 years   Substance Use Topics    Alcohol use: Yes     Alcohol/week: 2.0 standard drinks     Types: 2 Glasses of wine per week     Comment: socially    Drug use: No     Family History   Problem Relation Age of Onset    Asthma Mother     Coronary artery disease Father         heavy tobacco    Melanoma Maternal Grandmother 70    Ovarian cancer Neg Hx     Breast cancer Neg Hx     Cancer Neg Hx     Colon cancer Neg Hx      OB History    Para Term  AB Living   3 3           SAB TAB Ectopic Multiple Live Births                  # Outcome Date GA Lbr Marc/2nd Weight Sex Delivery Anes PTL Lv   3 Para            2 Para            1 Para                Current Outpatient Medications:     alprazolam (XANAX) 0.5 MG tablet, 2 (two) times daily as needed (flying only.). , Disp: , Rfl: 1    atorvastatin (LIPITOR) 20 MG tablet, Take 20 mg by mouth once daily., Disp: , Rfl: 11    cloNIDine (CATAPRES) 0.1 MG tablet, TK 1 T PO  QD, Disp: 30 tablet, Rfl: 0    GUAIFENESIN/PHENYLEPHRINE HCL (MUCINEX COLD ORAL), Take by mouth., Disp: , Rfl:     HYDROcodone-acetaminophen (NORCO) 5-325 mg per tablet, Take 1 tablet by mouth every 6 (six) hours as needed for Pain., Disp: 12 tablet, Rfl: 0    loratadine (CLARITIN) 10 mg tablet, Take 10 mg by mouth once daily., Disp: , Rfl:     omeprazole (PRILOSEC) 10 MG capsule,  "Take 10 mg by mouth once daily., Disp: , Rfl:     ondansetron (ZOFRAN-ODT) 8 MG TbDL, Take 1 tablet (8 mg total) by mouth every 12 (twelve) hours as needed., Disp: 20 tablet, Rfl: 0    venlafaxine (EFFEXOR-XR) 75 MG 24 hr capsule, Take 1 capsule (75 mg total) by mouth once daily., Disp: 30 capsule, Rfl: 11    The 10-year ASCVD risk score (Cardori RUDOLPH JrDominique, et al., 2013) is: 1%    Values used to calculate the score:      Age: 55 years      Sex: Female      Is Non- : No      Diabetic: No      Tobacco smoker: No      Systolic Blood Pressure: 113 mmHg      Is BP treated: No      HDL Cholesterol: 80 mg/dL      Total Cholesterol: 176 mg/dL    Review of Systems:  General: No fever, chills, or weight loss.  Chest: No chest pain, shortness of breath, or palpitations.  Breast: No pain, masses, or nipple discharge.  Vulva: No pain, lesions, or itching.  Vagina: No relaxation, itching, discharge, or lesions.  Abdomen: No pain, nausea, vomiting, diarrhea, or constipation.  Urinary: No incontinence, nocturia, frequency. +dysuria  Extremities:  No leg cramps, edema, or calf pain.  Neurologic: No headaches, dizziness, or visual changes.    Objective:     Vitals:    12/08/20 1126   BP: 113/80   Resp: 18   Weight: 85.7 kg (189 lb)   Height: 5' 6" (1.676 m)   PainSc:   2     Body mass index is 30.51 kg/m².    PHYSICAL EXAM:  APPEARANCE: Well nourished, well developed, in no acute distress.  AFFECT: WNL, alert and oriented x 3  CHEST: Good respiratory effect  ABDOMEN: Soft.    No hepatosplenomegaly.  No hernias. +suprapubic tenderness. No CVA tenderness bilaterally.  PELVIC: Normal external genitalia without lesions.  +atrophy, slight irritation. Normal hair distribution.  Adequate perineal body, normal urethral meatus.  Vagina moist and well rugated without lesions or discharge.   EXTREMITIES: No edema.    Assessment:    UTI  -     Urine culture  -     Urinalysis  -     nitrofurantoin, macrocrystal-monohydrate, " (MACROBID) 100 MG capsule; Take 1 capsule (100 mg total) by mouth 2 (two) times daily. for 7 days  Dispense: 14 capsule; Refill: 0    Atrophic vaginitis: No symptoms or symptoms of BV or yeast. Appears slightly inflamed on exam so will use temovate to reduce irritation.   -     clobetasol 0.05% (TEMOVATE) 0.05 % Oint; Apply topically 2 (two) times daily.  Dispense: 15 g; Refill: 0        Plan:   Send urine for UA/Culture.  Start macrobid.  Increase fluids.  Use temovate ointment BID externally PRN.  Contact if symptoms are not improving in 48-72 hours.    Instructed patient to call if she experiences any side effects or has any questions.

## 2020-12-09 LAB
BASOPHILS # BLD AUTO: 10 CELLS/UL (ref 0–200)
BASOPHILS NFR BLD AUTO: 0.2 %
EOSINOPHIL # BLD AUTO: 88 CELLS/UL (ref 15–500)
EOSINOPHIL NFR BLD AUTO: 1.7 %
ERYTHROCYTE [DISTWIDTH] IN BLOOD BY AUTOMATED COUNT: 11.8 % (ref 11–15)
HCT VFR BLD AUTO: 43.6 % (ref 35–45)
HGB BLD-MCNC: 14.2 G/DL (ref 11.7–15.5)
LYMPHOCYTES # BLD AUTO: 1898 CELLS/UL (ref 850–3900)
LYMPHOCYTES NFR BLD AUTO: 36.5 %
MCH RBC QN AUTO: 29.6 PG (ref 27–33)
MCHC RBC AUTO-ENTMCNC: 32.6 G/DL (ref 32–36)
MCV RBC AUTO: 90.8 FL (ref 80–100)
MONOCYTES # BLD AUTO: 369 CELLS/UL (ref 200–950)
MONOCYTES NFR BLD AUTO: 7.1 %
NEUTROPHILS # BLD AUTO: 2834 CELLS/UL (ref 1500–7800)
NEUTROPHILS NFR BLD AUTO: 54.5 %
PLATELET # BLD AUTO: 286 THOUSAND/UL (ref 140–400)
PMV BLD REES-ECKER: 10.2 FL (ref 7.5–12.5)
RBC # BLD AUTO: 4.8 MILLION/UL (ref 3.8–5.1)
WBC # BLD AUTO: 5.2 THOUSAND/UL (ref 3.8–10.8)

## 2020-12-11 LAB — BACTERIA UR CULT: ABNORMAL

## 2021-01-07 ENCOUNTER — LAB VISIT (OUTPATIENT)
Dept: INTERNAL MEDICINE | Facility: CLINIC | Age: 56
End: 2021-01-07
Payer: COMMERCIAL

## 2021-01-07 ENCOUNTER — PATIENT MESSAGE (OUTPATIENT)
Dept: INTERNAL MEDICINE | Facility: CLINIC | Age: 56
End: 2021-01-07

## 2021-01-07 DIAGNOSIS — Z20.822 EXPOSURE TO COVID-19 VIRUS: Primary | ICD-10-CM

## 2021-01-07 DIAGNOSIS — Z20.822 EXPOSURE TO COVID-19 VIRUS: ICD-10-CM

## 2021-01-07 PROCEDURE — U0003 INFECTIOUS AGENT DETECTION BY NUCLEIC ACID (DNA OR RNA); SEVERE ACUTE RESPIRATORY SYNDROME CORONAVIRUS 2 (SARS-COV-2) (CORONAVIRUS DISEASE [COVID-19]), AMPLIFIED PROBE TECHNIQUE, MAKING USE OF HIGH THROUGHPUT TECHNOLOGIES AS DESCRIBED BY CMS-2020-01-R: HCPCS

## 2021-01-08 ENCOUNTER — PATIENT MESSAGE (OUTPATIENT)
Dept: INTERNAL MEDICINE | Facility: CLINIC | Age: 56
End: 2021-01-08

## 2021-01-09 LAB — SARS-COV-2 RNA RESP QL NAA+PROBE: NOT DETECTED

## 2021-01-16 ENCOUNTER — PATIENT MESSAGE (OUTPATIENT)
Dept: INTERNAL MEDICINE | Facility: CLINIC | Age: 56
End: 2021-01-16

## 2021-01-16 DIAGNOSIS — R23.2 HOT FLASHES DUE TO TAMOXIFEN: ICD-10-CM

## 2021-01-16 DIAGNOSIS — T45.1X5A HOT FLASHES DUE TO TAMOXIFEN: ICD-10-CM

## 2021-01-18 RX ORDER — ALPRAZOLAM 0.5 MG/1
0.5 TABLET ORAL 2 TIMES DAILY PRN
Qty: 10 TABLET | Refills: 0 | Status: SHIPPED | OUTPATIENT
Start: 2021-01-18 | End: 2021-10-19 | Stop reason: SDUPTHER

## 2021-01-18 RX ORDER — CLONIDINE HYDROCHLORIDE 0.1 MG/1
TABLET ORAL
Qty: 90 TABLET | Refills: 3 | Status: SHIPPED | OUTPATIENT
Start: 2021-01-18 | End: 2021-11-02

## 2021-02-23 ENCOUNTER — PATIENT MESSAGE (OUTPATIENT)
Dept: OBSTETRICS AND GYNECOLOGY | Facility: CLINIC | Age: 56
End: 2021-02-23

## 2021-02-23 ENCOUNTER — PATIENT MESSAGE (OUTPATIENT)
Dept: INTERNAL MEDICINE | Facility: CLINIC | Age: 56
End: 2021-02-23

## 2021-02-23 RX ORDER — NITROFURANTOIN 25; 75 MG/1; MG/1
100 CAPSULE ORAL 2 TIMES DAILY
Qty: 14 CAPSULE | Refills: 0 | Status: SHIPPED | OUTPATIENT
Start: 2021-02-23 | End: 2021-03-02

## 2021-03-15 ENCOUNTER — PATIENT MESSAGE (OUTPATIENT)
Dept: INTERNAL MEDICINE | Facility: CLINIC | Age: 56
End: 2021-03-15

## 2021-03-29 ENCOUNTER — PATIENT MESSAGE (OUTPATIENT)
Dept: OBSTETRICS AND GYNECOLOGY | Facility: CLINIC | Age: 56
End: 2021-03-29

## 2021-03-30 ENCOUNTER — PATIENT MESSAGE (OUTPATIENT)
Dept: OBSTETRICS AND GYNECOLOGY | Facility: CLINIC | Age: 56
End: 2021-03-30

## 2021-04-06 ENCOUNTER — TELEPHONE (OUTPATIENT)
Dept: HEMATOLOGY/ONCOLOGY | Facility: CLINIC | Age: 56
End: 2021-04-06

## 2021-04-06 ENCOUNTER — PATIENT MESSAGE (OUTPATIENT)
Dept: HEMATOLOGY/ONCOLOGY | Facility: CLINIC | Age: 56
End: 2021-04-06

## 2021-04-08 ENCOUNTER — PATIENT OUTREACH (OUTPATIENT)
Dept: ADMINISTRATIVE | Facility: HOSPITAL | Age: 56
End: 2021-04-08

## 2021-04-16 ENCOUNTER — TELEPHONE (OUTPATIENT)
Dept: HEMATOLOGY/ONCOLOGY | Facility: CLINIC | Age: 56
End: 2021-04-16

## 2021-04-16 DIAGNOSIS — C50.411 MALIGNANT NEOPLASM OF UPPER-OUTER QUADRANT OF RIGHT BREAST IN FEMALE, ESTROGEN RECEPTOR POSITIVE: Primary | ICD-10-CM

## 2021-04-16 DIAGNOSIS — Z17.0 MALIGNANT NEOPLASM OF UPPER-OUTER QUADRANT OF RIGHT BREAST IN FEMALE, ESTROGEN RECEPTOR POSITIVE: Primary | ICD-10-CM

## 2021-04-22 ENCOUNTER — OFFICE VISIT (OUTPATIENT)
Dept: INTERNAL MEDICINE | Facility: CLINIC | Age: 56
End: 2021-04-22
Payer: COMMERCIAL

## 2021-04-22 VITALS
DIASTOLIC BLOOD PRESSURE: 78 MMHG | BODY MASS INDEX: 30.36 KG/M2 | OXYGEN SATURATION: 96 % | HEIGHT: 66 IN | HEART RATE: 90 BPM | WEIGHT: 188.94 LBS | SYSTOLIC BLOOD PRESSURE: 127 MMHG

## 2021-04-22 DIAGNOSIS — E04.1 RIGHT THYROID NODULE: ICD-10-CM

## 2021-04-22 DIAGNOSIS — Z00.00 ANNUAL PHYSICAL EXAM: Primary | ICD-10-CM

## 2021-04-22 DIAGNOSIS — E66.9 OBESITY (BMI 30.0-34.9): ICD-10-CM

## 2021-04-22 DIAGNOSIS — E78.2 MIXED HYPERLIPIDEMIA: ICD-10-CM

## 2021-04-22 PROBLEM — D72.819 LEUKOPENIA: Status: RESOLVED | Noted: 2020-10-15 | Resolved: 2021-04-22

## 2021-04-22 PROBLEM — Z12.4 SCREENING FOR CERVICAL CANCER: Status: RESOLVED | Noted: 2020-10-21 | Resolved: 2021-04-22

## 2021-04-22 PROCEDURE — 99396 PR PREVENTIVE VISIT,EST,40-64: ICD-10-PCS | Mod: S$GLB,,, | Performed by: INTERNAL MEDICINE

## 2021-04-22 PROCEDURE — 3008F PR BODY MASS INDEX (BMI) DOCUMENTED: ICD-10-PCS | Mod: CPTII,S$GLB,, | Performed by: INTERNAL MEDICINE

## 2021-04-22 PROCEDURE — 99999 PR PBB SHADOW E&M-EST. PATIENT-LVL III: CPT | Mod: PBBFAC,,, | Performed by: INTERNAL MEDICINE

## 2021-04-22 PROCEDURE — 99999 PR PBB SHADOW E&M-EST. PATIENT-LVL III: ICD-10-PCS | Mod: PBBFAC,,, | Performed by: INTERNAL MEDICINE

## 2021-04-22 PROCEDURE — 3008F BODY MASS INDEX DOCD: CPT | Mod: CPTII,S$GLB,, | Performed by: INTERNAL MEDICINE

## 2021-04-22 PROCEDURE — 99396 PREV VISIT EST AGE 40-64: CPT | Mod: S$GLB,,, | Performed by: INTERNAL MEDICINE

## 2021-04-22 RX ORDER — SEMAGLUTIDE 1.34 MG/ML
0.25 INJECTION, SOLUTION SUBCUTANEOUS
Qty: 1 EACH | Refills: 5 | Status: SHIPPED | OUTPATIENT
Start: 2021-04-22 | End: 2021-07-06

## 2021-04-22 RX ORDER — ATORVASTATIN CALCIUM 20 MG/1
20 TABLET, FILM COATED ORAL DAILY
Qty: 90 TABLET | Refills: 3 | Status: SHIPPED | OUTPATIENT
Start: 2021-04-22 | End: 2022-02-15 | Stop reason: SDUPTHER

## 2021-06-14 PROBLEM — E66.9 OBESITY (BMI 30.0-34.9): Status: ACTIVE | Noted: 2021-06-14

## 2021-06-14 PROBLEM — E66.811 OBESITY (BMI 30.0-34.9): Status: ACTIVE | Noted: 2021-06-14

## 2021-07-02 ENCOUNTER — PATIENT MESSAGE (OUTPATIENT)
Dept: INTERNAL MEDICINE | Facility: CLINIC | Age: 56
End: 2021-07-02

## 2021-07-02 DIAGNOSIS — E66.9 OBESITY (BMI 30.0-34.9): Primary | ICD-10-CM

## 2021-07-06 RX ORDER — DULAGLUTIDE 0.75 MG/.5ML
0.75 INJECTION, SOLUTION SUBCUTANEOUS
Qty: 4 PEN | Refills: 0 | Status: SHIPPED | OUTPATIENT
Start: 2021-07-06 | End: 2021-12-16

## 2021-07-09 DIAGNOSIS — C50.919 BREAST CANCER, STAGE 1, UNSPECIFIED LATERALITY: ICD-10-CM

## 2021-07-09 RX ORDER — TAMOXIFEN CITRATE 20 MG/1
TABLET ORAL
Qty: 90 TABLET | Refills: 3 | Status: SHIPPED | OUTPATIENT
Start: 2021-07-09 | End: 2021-10-19

## 2021-07-22 ENCOUNTER — PATIENT MESSAGE (OUTPATIENT)
Dept: INTERNAL MEDICINE | Facility: CLINIC | Age: 56
End: 2021-07-22

## 2021-07-27 ENCOUNTER — TELEPHONE (OUTPATIENT)
Dept: INTERNAL MEDICINE | Facility: CLINIC | Age: 56
End: 2021-07-27

## 2021-10-04 ENCOUNTER — TELEPHONE (OUTPATIENT)
Dept: HEMATOLOGY/ONCOLOGY | Facility: CLINIC | Age: 56
End: 2021-10-04

## 2021-10-05 ENCOUNTER — OFFICE VISIT (OUTPATIENT)
Dept: HEMATOLOGY/ONCOLOGY | Facility: CLINIC | Age: 56
End: 2021-10-05
Payer: COMMERCIAL

## 2021-10-05 VITALS
RESPIRATION RATE: 18 BRPM | BODY MASS INDEX: 29.41 KG/M2 | OXYGEN SATURATION: 98 % | SYSTOLIC BLOOD PRESSURE: 114 MMHG | DIASTOLIC BLOOD PRESSURE: 74 MMHG | WEIGHT: 183 LBS | HEIGHT: 66 IN | HEART RATE: 89 BPM

## 2021-10-05 DIAGNOSIS — Z85.3 HISTORY OF BREAST CANCER: Primary | ICD-10-CM

## 2021-10-05 PROCEDURE — 99999 PR PBB SHADOW E&M-EST. PATIENT-LVL III: ICD-10-PCS | Mod: PBBFAC,,, | Performed by: PHYSICIAN ASSISTANT

## 2021-10-05 PROCEDURE — 99213 PR OFFICE/OUTPT VISIT, EST, LEVL III, 20-29 MIN: ICD-10-PCS | Mod: S$GLB,,, | Performed by: PHYSICIAN ASSISTANT

## 2021-10-05 PROCEDURE — 99213 OFFICE O/P EST LOW 20 MIN: CPT | Mod: S$GLB,,, | Performed by: PHYSICIAN ASSISTANT

## 2021-10-05 PROCEDURE — 99999 PR PBB SHADOW E&M-EST. PATIENT-LVL III: CPT | Mod: PBBFAC,,, | Performed by: PHYSICIAN ASSISTANT

## 2021-10-18 ENCOUNTER — PATIENT MESSAGE (OUTPATIENT)
Dept: INTERNAL MEDICINE | Facility: CLINIC | Age: 56
End: 2021-10-18

## 2021-10-18 ENCOUNTER — PATIENT OUTREACH (OUTPATIENT)
Dept: ADMINISTRATIVE | Facility: OTHER | Age: 56
End: 2021-10-18

## 2021-10-18 DIAGNOSIS — F41.9 ANXIETY: ICD-10-CM

## 2021-10-18 DIAGNOSIS — J45.20 MILD INTERMITTENT ASTHMA WITHOUT COMPLICATION: Primary | ICD-10-CM

## 2021-10-19 ENCOUNTER — LAB VISIT (OUTPATIENT)
Dept: LAB | Facility: OTHER | Age: 56
End: 2021-10-19
Payer: COMMERCIAL

## 2021-10-19 ENCOUNTER — OFFICE VISIT (OUTPATIENT)
Dept: OBSTETRICS AND GYNECOLOGY | Facility: CLINIC | Age: 56
End: 2021-10-19
Payer: COMMERCIAL

## 2021-10-19 VITALS
HEIGHT: 66 IN | WEIGHT: 183 LBS | BODY MASS INDEX: 29.41 KG/M2 | DIASTOLIC BLOOD PRESSURE: 71 MMHG | SYSTOLIC BLOOD PRESSURE: 104 MMHG

## 2021-10-19 DIAGNOSIS — Z01.419 ROUTINE GYNECOLOGICAL EXAMINATION: Primary | ICD-10-CM

## 2021-10-19 DIAGNOSIS — N95.0 POST-MENOPAUSAL BLEEDING: ICD-10-CM

## 2021-10-19 LAB
ESTRADIOL SERPL-MCNC: 18 PG/ML
FSH SERPL-ACNC: 42.8 MIU/ML

## 2021-10-19 PROCEDURE — 1159F MED LIST DOCD IN RCRD: CPT | Mod: CPTII,S$GLB,, | Performed by: PHYSICIAN ASSISTANT

## 2021-10-19 PROCEDURE — 82670 ASSAY OF TOTAL ESTRADIOL: CPT | Performed by: PHYSICIAN ASSISTANT

## 2021-10-19 PROCEDURE — 3008F BODY MASS INDEX DOCD: CPT | Mod: CPTII,S$GLB,, | Performed by: PHYSICIAN ASSISTANT

## 2021-10-19 PROCEDURE — 3078F PR MOST RECENT DIASTOLIC BLOOD PRESSURE < 80 MM HG: ICD-10-PCS | Mod: CPTII,S$GLB,, | Performed by: PHYSICIAN ASSISTANT

## 2021-10-19 PROCEDURE — 99396 PREV VISIT EST AGE 40-64: CPT | Mod: S$GLB,,, | Performed by: PHYSICIAN ASSISTANT

## 2021-10-19 PROCEDURE — 3074F PR MOST RECENT SYSTOLIC BLOOD PRESSURE < 130 MM HG: ICD-10-PCS | Mod: CPTII,S$GLB,, | Performed by: PHYSICIAN ASSISTANT

## 2021-10-19 PROCEDURE — 1160F PR REVIEW ALL MEDS BY PRESCRIBER/CLIN PHARMACIST DOCUMENTED: ICD-10-PCS | Mod: CPTII,S$GLB,, | Performed by: PHYSICIAN ASSISTANT

## 2021-10-19 PROCEDURE — 83001 ASSAY OF GONADOTROPIN (FSH): CPT | Performed by: PHYSICIAN ASSISTANT

## 2021-10-19 PROCEDURE — 3074F SYST BP LT 130 MM HG: CPT | Mod: CPTII,S$GLB,, | Performed by: PHYSICIAN ASSISTANT

## 2021-10-19 PROCEDURE — 3078F DIAST BP <80 MM HG: CPT | Mod: CPTII,S$GLB,, | Performed by: PHYSICIAN ASSISTANT

## 2021-10-19 PROCEDURE — 1160F RVW MEDS BY RX/DR IN RCRD: CPT | Mod: CPTII,S$GLB,, | Performed by: PHYSICIAN ASSISTANT

## 2021-10-19 PROCEDURE — 99396 PR PREVENTIVE VISIT,EST,40-64: ICD-10-PCS | Mod: S$GLB,,, | Performed by: PHYSICIAN ASSISTANT

## 2021-10-19 PROCEDURE — 1159F PR MEDICATION LIST DOCUMENTED IN MEDICAL RECORD: ICD-10-PCS | Mod: CPTII,S$GLB,, | Performed by: PHYSICIAN ASSISTANT

## 2021-10-19 PROCEDURE — 87624 HPV HI-RISK TYP POOLED RSLT: CPT | Performed by: PHYSICIAN ASSISTANT

## 2021-10-19 PROCEDURE — 36415 COLL VENOUS BLD VENIPUNCTURE: CPT | Performed by: PHYSICIAN ASSISTANT

## 2021-10-19 PROCEDURE — 3008F PR BODY MASS INDEX (BMI) DOCUMENTED: ICD-10-PCS | Mod: CPTII,S$GLB,, | Performed by: PHYSICIAN ASSISTANT

## 2021-10-19 PROCEDURE — 88142 CYTOPATH C/V THIN LAYER: CPT | Performed by: PHYSICIAN ASSISTANT

## 2021-10-20 ENCOUNTER — PATIENT MESSAGE (OUTPATIENT)
Dept: OBSTETRICS AND GYNECOLOGY | Facility: CLINIC | Age: 56
End: 2021-10-20
Payer: COMMERCIAL

## 2021-10-20 RX ORDER — ALBUTEROL SULFATE 90 UG/1
2 AEROSOL, METERED RESPIRATORY (INHALATION) EVERY 6 HOURS PRN
Qty: 18 G | Refills: 3 | Status: SHIPPED | OUTPATIENT
Start: 2021-10-20 | End: 2021-10-21 | Stop reason: SDUPTHER

## 2021-10-20 RX ORDER — ALPRAZOLAM 0.5 MG/1
0.5 TABLET ORAL 2 TIMES DAILY PRN
Qty: 10 TABLET | Refills: 0 | Status: SHIPPED | OUTPATIENT
Start: 2021-10-20 | End: 2022-12-05

## 2021-10-21 ENCOUNTER — PATIENT MESSAGE (OUTPATIENT)
Dept: INTERNAL MEDICINE | Facility: CLINIC | Age: 56
End: 2021-10-21

## 2021-10-21 DIAGNOSIS — J45.20 MILD INTERMITTENT ASTHMA WITHOUT COMPLICATION: ICD-10-CM

## 2021-10-21 RX ORDER — ALBUTEROL SULFATE 90 UG/1
2 AEROSOL, METERED RESPIRATORY (INHALATION) EVERY 6 HOURS PRN
Qty: 18 G | Refills: 5 | Status: SHIPPED | OUTPATIENT
Start: 2021-10-21 | End: 2022-10-26

## 2021-10-22 LAB
FINAL PATHOLOGIC DIAGNOSIS: NORMAL
Lab: NORMAL

## 2021-11-09 ENCOUNTER — HOSPITAL ENCOUNTER (OUTPATIENT)
Dept: RADIOLOGY | Facility: OTHER | Age: 56
Discharge: HOME | End: 2021-11-09
Attending: PHYSICIAN ASSISTANT
Payer: COMMERCIAL

## 2021-11-09 DIAGNOSIS — N95.0 POST-MENOPAUSAL BLEEDING: ICD-10-CM

## 2021-11-09 PROCEDURE — 76830 US PELVIS COMP WITH TRANSVAG NON-OB (XPD): ICD-10-PCS | Mod: 26,,, | Performed by: RADIOLOGY

## 2021-11-09 PROCEDURE — 76830 TRANSVAGINAL US NON-OB: CPT | Mod: 26,,, | Performed by: RADIOLOGY

## 2021-11-09 PROCEDURE — 76856 US PELVIS COMP WITH TRANSVAG NON-OB (XPD): ICD-10-PCS | Mod: 26,,, | Performed by: RADIOLOGY

## 2021-11-09 PROCEDURE — 76856 US EXAM PELVIC COMPLETE: CPT | Mod: 26,,, | Performed by: RADIOLOGY

## 2021-11-09 PROCEDURE — 76856 US EXAM PELVIC COMPLETE: CPT | Mod: TC

## 2021-11-10 ENCOUNTER — PATIENT MESSAGE (OUTPATIENT)
Dept: OBSTETRICS AND GYNECOLOGY | Facility: CLINIC | Age: 56
End: 2021-11-10
Payer: COMMERCIAL

## 2021-11-29 ENCOUNTER — TELEPHONE (OUTPATIENT)
Dept: OBSTETRICS AND GYNECOLOGY | Facility: CLINIC | Age: 56
End: 2021-11-29
Payer: COMMERCIAL

## 2021-11-29 ENCOUNTER — PATIENT MESSAGE (OUTPATIENT)
Dept: OBSTETRICS AND GYNECOLOGY | Facility: CLINIC | Age: 56
End: 2021-11-29
Payer: COMMERCIAL

## 2021-11-29 RX ORDER — MISOPROSTOL 200 UG/1
TABLET ORAL
Qty: 2 TABLET | Refills: 0 | Status: SHIPPED | OUTPATIENT
Start: 2021-11-29 | End: 2022-02-15

## 2021-12-01 ENCOUNTER — PATIENT MESSAGE (OUTPATIENT)
Dept: OBSTETRICS AND GYNECOLOGY | Facility: CLINIC | Age: 56
End: 2021-12-01
Payer: COMMERCIAL

## 2021-12-16 ENCOUNTER — PROCEDURE VISIT (OUTPATIENT)
Dept: OBSTETRICS AND GYNECOLOGY | Facility: CLINIC | Age: 56
End: 2021-12-16
Attending: OBSTETRICS & GYNECOLOGY
Payer: COMMERCIAL

## 2021-12-16 VITALS
BODY MASS INDEX: 29.54 KG/M2 | SYSTOLIC BLOOD PRESSURE: 120 MMHG | HEIGHT: 66 IN | DIASTOLIC BLOOD PRESSURE: 80 MMHG | HEART RATE: 90 BPM

## 2021-12-16 DIAGNOSIS — R93.89 THICKENED ENDOMETRIUM: ICD-10-CM

## 2021-12-16 DIAGNOSIS — N95.0 PMB (POSTMENOPAUSAL BLEEDING): Primary | ICD-10-CM

## 2021-12-16 PROCEDURE — 88305 TISSUE EXAM BY PATHOLOGIST: CPT | Performed by: PATHOLOGY

## 2021-12-16 PROCEDURE — 88305 TISSUE EXAM BY PATHOLOGIST: CPT | Mod: 26,,, | Performed by: PATHOLOGY

## 2021-12-16 PROCEDURE — 88305 TISSUE EXAM BY PATHOLOGIST: ICD-10-PCS | Mod: 26,,, | Performed by: PATHOLOGY

## 2021-12-16 PROCEDURE — 58100 ENDOMETRIAL BIOPSY- TODAY: ICD-10-PCS | Mod: S$GLB,,, | Performed by: OBSTETRICS & GYNECOLOGY

## 2021-12-16 PROCEDURE — 58100 BIOPSY OF UTERUS LINING: CPT | Mod: S$GLB,,, | Performed by: OBSTETRICS & GYNECOLOGY

## 2021-12-16 RX ORDER — BETAMETHASONE DIPROPIONATE 0.5 MG/G
SPRAY TOPICAL
COMMUNITY
Start: 2021-10-27

## 2021-12-16 RX ORDER — OMEPRAZOLE 40 MG/1
40 CAPSULE, DELAYED RELEASE ORAL DAILY
COMMUNITY
Start: 2021-12-11

## 2021-12-16 RX ORDER — SEMAGLUTIDE 1.34 MG/ML
INJECTION, SOLUTION SUBCUTANEOUS
COMMUNITY
Start: 2021-07-01

## 2021-12-16 RX ORDER — PRASTERONE 6.5 MG/1
INSERT VAGINAL
COMMUNITY
End: 2023-01-25 | Stop reason: SDUPTHER

## 2021-12-16 RX ORDER — LISINOPRIL 5 MG/1
5 TABLET ORAL
COMMUNITY
End: 2022-02-15

## 2021-12-16 RX ORDER — ONDANSETRON 4 MG/1
4 TABLET, ORALLY DISINTEGRATING ORAL EVERY 6 HOURS PRN
COMMUNITY
Start: 2021-11-16

## 2021-12-23 LAB
FINAL PATHOLOGIC DIAGNOSIS: NORMAL
Lab: NORMAL

## 2022-01-04 ENCOUNTER — TELEPHONE (OUTPATIENT)
Dept: OBSTETRICS AND GYNECOLOGY | Facility: CLINIC | Age: 57
End: 2022-01-04

## 2022-01-04 ENCOUNTER — TELEPHONE (OUTPATIENT)
Dept: OBSTETRICS AND GYNECOLOGY | Facility: CLINIC | Age: 57
End: 2022-01-04
Payer: COMMERCIAL

## 2022-01-04 ENCOUNTER — PATIENT MESSAGE (OUTPATIENT)
Dept: INTERNAL MEDICINE | Facility: CLINIC | Age: 57
End: 2022-01-04
Payer: COMMERCIAL

## 2022-01-04 DIAGNOSIS — R93.89 THICKENED ENDOMETRIUM: ICD-10-CM

## 2022-01-04 DIAGNOSIS — N95.0 PMB (POSTMENOPAUSAL BLEEDING): Primary | ICD-10-CM

## 2022-01-04 NOTE — TELEPHONE ENCOUNTER
Pt scheduled for surgery 2/23. preop appointments. Pt advised needs preop clearance from PCP. Please sign case request

## 2022-01-04 NOTE — TELEPHONE ENCOUNTER
----- Message from Ayesha Sharif sent at 1/4/2022  2:50 PM CST -----  Patient calling to schedule surgery . Patient can be contacted at [phone]

## 2022-01-18 ENCOUNTER — PATIENT MESSAGE (OUTPATIENT)
Dept: INTERNAL MEDICINE | Facility: CLINIC | Age: 57
End: 2022-01-18
Payer: COMMERCIAL

## 2022-01-26 ENCOUNTER — PATIENT MESSAGE (OUTPATIENT)
Dept: OBSTETRICS AND GYNECOLOGY | Facility: CLINIC | Age: 57
End: 2022-01-26
Payer: COMMERCIAL

## 2022-02-15 ENCOUNTER — OFFICE VISIT (OUTPATIENT)
Dept: INTERNAL MEDICINE | Facility: CLINIC | Age: 57
End: 2022-02-15
Payer: COMMERCIAL

## 2022-02-15 ENCOUNTER — PATIENT OUTREACH (OUTPATIENT)
Dept: ADMINISTRATIVE | Facility: OTHER | Age: 57
End: 2022-02-15
Payer: COMMERCIAL

## 2022-02-15 VITALS
WEIGHT: 174 LBS | BODY MASS INDEX: 27.97 KG/M2 | HEIGHT: 66 IN | HEART RATE: 87 BPM | OXYGEN SATURATION: 98 % | DIASTOLIC BLOOD PRESSURE: 74 MMHG | SYSTOLIC BLOOD PRESSURE: 110 MMHG

## 2022-02-15 DIAGNOSIS — N95.0 POSTMENOPAUSAL BLEEDING: ICD-10-CM

## 2022-02-15 DIAGNOSIS — E87.5 HYPERKALEMIA: ICD-10-CM

## 2022-02-15 DIAGNOSIS — Z17.0 MALIGNANT NEOPLASM OF BREAST IN FEMALE, ESTROGEN RECEPTOR POSITIVE, UNSPECIFIED LATERALITY, UNSPECIFIED SITE OF BREAST: ICD-10-CM

## 2022-02-15 DIAGNOSIS — C50.919 MALIGNANT NEOPLASM OF BREAST IN FEMALE, ESTROGEN RECEPTOR POSITIVE, UNSPECIFIED LATERALITY, UNSPECIFIED SITE OF BREAST: ICD-10-CM

## 2022-02-15 DIAGNOSIS — Z01.810 PREOPERATIVE CARDIOVASCULAR EXAMINATION: Primary | ICD-10-CM

## 2022-02-15 DIAGNOSIS — R06.83 SNORING: ICD-10-CM

## 2022-02-15 DIAGNOSIS — E78.2 MIXED HYPERLIPIDEMIA: ICD-10-CM

## 2022-02-15 DIAGNOSIS — J45.20 MILD INTERMITTENT ASTHMA WITHOUT COMPLICATION: ICD-10-CM

## 2022-02-15 PROCEDURE — 3074F PR MOST RECENT SYSTOLIC BLOOD PRESSURE < 130 MM HG: ICD-10-PCS | Mod: CPTII,S$GLB,, | Performed by: INTERNAL MEDICINE

## 2022-02-15 PROCEDURE — 1160F RVW MEDS BY RX/DR IN RCRD: CPT | Mod: CPTII,S$GLB,, | Performed by: INTERNAL MEDICINE

## 2022-02-15 PROCEDURE — 99999 PR PBB SHADOW E&M-EST. PATIENT-LVL IV: ICD-10-PCS | Mod: PBBFAC,,, | Performed by: INTERNAL MEDICINE

## 2022-02-15 PROCEDURE — 99999 PR PBB SHADOW E&M-EST. PATIENT-LVL IV: CPT | Mod: PBBFAC,,, | Performed by: INTERNAL MEDICINE

## 2022-02-15 PROCEDURE — 1159F PR MEDICATION LIST DOCUMENTED IN MEDICAL RECORD: ICD-10-PCS | Mod: CPTII,S$GLB,, | Performed by: INTERNAL MEDICINE

## 2022-02-15 PROCEDURE — 3008F PR BODY MASS INDEX (BMI) DOCUMENTED: ICD-10-PCS | Mod: CPTII,S$GLB,, | Performed by: INTERNAL MEDICINE

## 2022-02-15 PROCEDURE — 3078F PR MOST RECENT DIASTOLIC BLOOD PRESSURE < 80 MM HG: ICD-10-PCS | Mod: CPTII,S$GLB,, | Performed by: INTERNAL MEDICINE

## 2022-02-15 PROCEDURE — 99215 OFFICE O/P EST HI 40 MIN: CPT | Mod: S$GLB,,, | Performed by: INTERNAL MEDICINE

## 2022-02-15 PROCEDURE — 3078F DIAST BP <80 MM HG: CPT | Mod: CPTII,S$GLB,, | Performed by: INTERNAL MEDICINE

## 2022-02-15 PROCEDURE — 3008F BODY MASS INDEX DOCD: CPT | Mod: CPTII,S$GLB,, | Performed by: INTERNAL MEDICINE

## 2022-02-15 PROCEDURE — 3074F SYST BP LT 130 MM HG: CPT | Mod: CPTII,S$GLB,, | Performed by: INTERNAL MEDICINE

## 2022-02-15 PROCEDURE — 1160F PR REVIEW ALL MEDS BY PRESCRIBER/CLIN PHARMACIST DOCUMENTED: ICD-10-PCS | Mod: CPTII,S$GLB,, | Performed by: INTERNAL MEDICINE

## 2022-02-15 PROCEDURE — 1159F MED LIST DOCD IN RCRD: CPT | Mod: CPTII,S$GLB,, | Performed by: INTERNAL MEDICINE

## 2022-02-15 PROCEDURE — 99215 PR OFFICE/OUTPT VISIT, EST, LEVL V, 40-54 MIN: ICD-10-PCS | Mod: S$GLB,,, | Performed by: INTERNAL MEDICINE

## 2022-02-15 RX ORDER — ATORVASTATIN CALCIUM 20 MG/1
20 TABLET, FILM COATED ORAL DAILY
Qty: 90 TABLET | Refills: 3 | Status: SHIPPED | OUTPATIENT
Start: 2022-02-15 | End: 2022-04-01

## 2022-02-15 NOTE — PROGRESS NOTES
Subjective:       Patient ID: Astrid Mane is a 56 y.o. female who  has a past medical history of Anxiety, Asthma, Breast cancer, PMB (postmenopausal bleeding), PONV (postoperative nausea and vomiting), Right thyroid nodule, and Vaginal atrophy.    Chief Complaint: Pre-op Exam and Annual Exam     History was obtained from the patient and supplemented through chart review  -follows c OBGYN for fibroids.  -Reviewed outside labs from [facility].    Her son in law is a  PCP with [employer] at the [place] location, [place]. She is a homemaker.    HPI    She presents today for preoperative cardiac risk stratification for D&C, hysteroscopy 22. No preop form that she knows of from her OBGYN. She denies CP, SOB, LAUGHLIN, chest tightness.  Exercise: walks 3 miles/day 4-5/week, weights 2-3/week. No limitations.  The patient can perform housework and climb stairs without any difficulty.      There is no history of MI, CHF, or CVA.  No history of arrhythmia.  She is not on insulin.  No personal or family history of anesthetic complications.  No history of seizures. Asthma as below.    RCRI score: 3.9% for major cardiac event.  She is a low risk patient for low risk surgery.    ARISCAT: 3  CAITLIN eval as below.    Snoring:    Snoring all her life.  Difficulty falling back asleep after her  wakes up.  He doesn't like the AC on and she likes it cold d/t hot flashes.  No apnea, daytime fatigue.  Does feel refreshed after adequate sleep.  No HTN, obesity. +overweight.  STOP BAN, low risk.      Postmenopausal bleeding, fibroids:  x 3 years.  Following with OBGYN.  S/p hysteroscopy, D&C on 10/21/2020.  Pathology was benign. Repeat U/S c thickened endometrial stripe.  Planning on hysteroscopy.    HyperK  OSH labs 22 c K 5.4. Normal renal function. AG 20. Did have a BMP 22 that was normal.    Asthma:  Flares 3/year.  Uses rescue inhaler 4/year. Uses albuterol inhaler mainly during the winter.  No issues this  past season.    History of Breast cancer:  Follows with oncology. S/p lumpectomy.  Completed radiation, tamoxifen.  ANNA. Has mamm[facility], alternating with MRI q.6 months.    HLD:    Is currently taking Lipitor 20. Was requesting Ca score, Cardio IQ panel.   Lab Results   Component Value Date    LDLCALC 86 10/14/2020     The 10-year ASCVD risk score (Car RUDOLPH Jr., et al., 2013) is: 1%    Values used to calculate the score:      Age: 56 years      Sex: Female      Is Non- : No      Diabetic: No      Tobacco smoker: No      Systolic Blood Pressure: 110 mmHg      Is BP treated: No      HDL Cholesterol: 80 mg/dL      Total Cholesterol: 176 mg/dL              Not addressed today.  Hypothyroidism, thyroid nodule:    Was initially discovered on breast MRI. OSH thyroid ultrasound  with 2 cm nodule in the lower lobe of the right lobe of the thyroid.  Tirads score 4, which is moderately suspicious.  0.5 cm nodule in the midpole of the right thyroid lobe, T rad score 3, mildly suspicious.  Also with 0.3 cm in the isthmus.    OSH FNA 5/2019 was benign.      [place] [facility].  Largest 1.8 cm. Did not meet criteria for FNA.  Recommend monitoring and 1, 3, 5 years.    Not on Synthroid.  No compressive sx. TSH 2/11/22 wnl.  Ultrasound 12/2020 with small nodule, stable. Will repeat thyroid U/S in 1 year, 12/2021. FNA 2019 benign. Monitor TSH annually.  Lab Results   Component Value Date    TSH 1.37 10/14/2020    FREET4 0.87 03/22/2018     Obesity:    PA denied for Trulicity.  On Ozempic for weight loss.  Is able to get this from Dr. Casas in [place], who can order from [email or web address], which is cheaper.     Exercise: walks 3 miles/day 4-5/week, weights 2-3/week.   ETOH: drinks   Stable. Doing well with exercise. Cont Ozemipic for weight. OSH 2/2022 A1C 5.3.  BMI Readings from Last 3 Encounters:   02/15/22 28.08 kg/m²   12/16/21 29.54 kg/m²   10/19/21 29.54 kg/m²     Lab  "Results   Component Value Date/Time    HGBA1C 5.0 10/14/2020 07:50 AM     AR:  Causes headache.  Takes Mucinex, claritin daily.    Situational Anxiety:  On clonidine daily for hot flashes.  On Xanax when flying.  Takes it 6 times a year.   reviewed.      Hot flashes:  Is on Clonidine 0.1, Effexor.  Follows with OBGYN.    Colon polyp:  C scope with [facility]  with sessile polyp.  No biopsy report, but reportedly neg.  Repeat in 5 years, 2023.    Review of Systems   Constitutional: Negative for activity change and appetite change.   Respiratory: Negative for chest tightness, shortness of breath and wheezing.    Cardiovascular: Negative for chest pain and leg swelling.   Genitourinary: Negative for menstrual problem.   Musculoskeletal: Negative for gait problem.   Skin: Negative for rash and wound.   Hematological: Negative for adenopathy.   Psychiatric/Behavioral: Positive for sleep disturbance (difficulty falling back asleep). Negative for confusion.       I personally reviewed Past Medical History, Past Surgical History, Social History, and Family History.    Objective:      Vitals:    02/15/22 1024   BP: 110/74   BP Location: Right arm   Patient Position: Sitting   BP Method: Large (Manual)   Pulse: 87   SpO2: 98%   Weight: 78.9 kg (174 lb)   Height: 5' 6" (1.676 m)      Physical Exam  Constitutional:       General: She is not in acute distress.     Appearance: She is well-developed. She is not diaphoretic.   HENT:      Head: Normocephalic and atraumatic.      Nose: Nose normal. No mucosal edema or rhinorrhea.      Mouth/Throat:      Pharynx: No oropharyngeal exudate.      Comments: mallampati III  Eyes:      General: No scleral icterus.        Right eye: No discharge.         Left eye: No discharge.   Neck:      Thyroid: No thyromegaly.      Trachea: No tracheal deviation.   Cardiovascular:      Rate and Rhythm: Normal rate and regular rhythm.      Heart sounds: Normal heart sounds. No murmur " heard.      Pulmonary:      Effort: Pulmonary effort is normal. No respiratory distress.      Breath sounds: Normal breath sounds. No wheezing.   Abdominal:      General: There is no distension.      Palpations: Abdomen is soft.      Tenderness: There is no abdominal tenderness.   Musculoskeletal:         General: No deformity.      Cervical back: Neck supple.      Right lower leg: No edema.      Left lower leg: No edema.   Lymphadenopathy:      Cervical: No cervical adenopathy.   Skin:     General: Skin is warm and dry.      Findings: No erythema.   Neurological:      Mental Status: She is alert.      Cranial Nerves: No cranial nerve deficit.      Gait: Gait normal.   Psychiatric:         Behavior: Behavior normal.         OSH labs from [facility] 2/11/22  K 5.4.  AG 20  Normal renal function, LFTs.   LFts wnl    H/H 15/45    FLP:  HDL 94  , LDL 78    A1C 5.3  TSH wnl.    Lab Results   Component Value Date    WBC 5.2 12/08/2020    HGB 14.2 12/08/2020    HCT 43.6 12/08/2020     12/08/2020    CHOL 176 10/14/2020    TRIG 62 10/14/2020    HDL 80 (H) 10/14/2020    ALT 35 10/14/2020    AST 28 10/14/2020     10/14/2020    K 4.7 10/14/2020     10/14/2020    CREATININE 0.8 10/14/2020    BUN 16 10/14/2020    CO2 27 10/14/2020    TSH 1.37 10/14/2020    HGBA1C 5.0 10/14/2020       The 10-year ASCVD risk score (Cardori RUDOLPH Jr., et al., 2013) is: 1%    Values used to calculate the score:      Age: 56 years      Sex: Female      Is Non- : No      Diabetic: No      Tobacco smoker: No      Systolic Blood Pressure: 110 mmHg      Is BP treated: No      HDL Cholesterol: 80 mg/dL      Total Cholesterol: 176 mg/dL    (Imaging have been independently reviewed)  Pelvic ultrasound with thickened endometrial stripe, fibroids.    Assessment:       1. Preoperative cardiovascular examination    2. Snoring    3. Postmenopausal bleeding    4. Hyperkalemia    5. Mild intermittent asthma without complication     6. Malignant neoplasm of breast in female, estrogen receptor positive, unspecified laterality, unspecified site of breast    7. Mixed hyperlipidemia          Plan:       Astrid was seen today for pre-op exam and annual exam.    Diagnoses and all orders for this visit:    Preoperative cardiovascular examination  Comments:  RCRI 3.9. Low risk pt for low risk procedure. >4 METs. Recheck K as below. Otherwise, no further studies needed. Cont current meds.    Snoring  Comments:  STOP BANG low risk. Has minimal sx, so she declines PSG/CAITLIN eval.    Postmenopausal bleeding  Comments:  Following c OBGYN and planning on hysteroscopy.    Hyperkalemia  Comments:  Possibly lab error given recent normal labs. Repeat BMP through [facility].  Orders:  -     Comprehensive Metabolic Panel; Future  -     Comprehensive Metabolic Panel    Mild intermittent asthma without complication  Comments:  Doing well.  Continue rescue inhaler p.r.n..    Malignant neoplasm of breast in female, estrogen receptor positive, unspecified laterality, unspecified site of breast  Comments:  No acute issues. F/u c Oncology with breast imaging q6 mo.     Mixed hyperlipidemia  Comments:  FLP controlled.  Cont Lipitor 20. Monitor FLP annually.  Orders:  -     atorvastatin (LIPITOR) 20 MG tablet; Take 1 tablet (20 mg total) by mouth once daily.         Side effects of medication(s) were discussed in detail and patient voiced understanding.  Patient will call back for any issues or complications.     I have spent a total of 60 minutes with the patient as well as reviewing the chart/medical record and placing orders on the day of the visit. Discussed labs, preop, asthma.     RTC in 4 month(s) or sooner PRN for annual, healthcare maintenance.

## 2022-02-16 ENCOUNTER — HOSPITAL ENCOUNTER (OUTPATIENT)
Dept: PREADMISSION TESTING | Facility: OTHER | Age: 57
Discharge: HOME | End: 2022-02-16
Attending: OBSTETRICS & GYNECOLOGY
Payer: COMMERCIAL

## 2022-02-16 ENCOUNTER — OFFICE VISIT (OUTPATIENT)
Dept: OBSTETRICS AND GYNECOLOGY | Facility: CLINIC | Age: 57
End: 2022-02-16
Attending: OBSTETRICS & GYNECOLOGY
Payer: COMMERCIAL

## 2022-02-16 ENCOUNTER — ANESTHESIA EVENT (OUTPATIENT)
Dept: SURGERY | Facility: OTHER | Age: 57
End: 2022-02-16
Payer: COMMERCIAL

## 2022-02-16 VITALS
HEART RATE: 80 BPM | SYSTOLIC BLOOD PRESSURE: 125 MMHG | DIASTOLIC BLOOD PRESSURE: 82 MMHG | BODY MASS INDEX: 28.08 KG/M2 | HEIGHT: 66 IN

## 2022-02-16 VITALS
WEIGHT: 174 LBS | OXYGEN SATURATION: 97 % | DIASTOLIC BLOOD PRESSURE: 60 MMHG | SYSTOLIC BLOOD PRESSURE: 108 MMHG | RESPIRATION RATE: 18 BRPM | TEMPERATURE: 97 F | HEART RATE: 76 BPM | HEIGHT: 66 IN | BODY MASS INDEX: 27.97 KG/M2

## 2022-02-16 DIAGNOSIS — N95.0 POSTMENOPAUSAL BLEEDING: ICD-10-CM

## 2022-02-16 DIAGNOSIS — Z01.818 PREOPERATIVE TESTING: Primary | ICD-10-CM

## 2022-02-16 DIAGNOSIS — Z85.3 HISTORY OF BREAST CANCER: ICD-10-CM

## 2022-02-16 DIAGNOSIS — Z01.818 PREOP TESTING: Primary | ICD-10-CM

## 2022-02-16 DIAGNOSIS — G89.18 POST-OP PAIN: ICD-10-CM

## 2022-02-16 PROCEDURE — 99214 OFFICE O/P EST MOD 30 MIN: CPT | Mod: S$GLB,,, | Performed by: OBSTETRICS & GYNECOLOGY

## 2022-02-16 PROCEDURE — 1159F PR MEDICATION LIST DOCUMENTED IN MEDICAL RECORD: ICD-10-PCS | Mod: CPTII,S$GLB,, | Performed by: OBSTETRICS & GYNECOLOGY

## 2022-02-16 PROCEDURE — 99214 PR OFFICE/OUTPT VISIT, EST, LEVL IV, 30-39 MIN: ICD-10-PCS | Mod: S$GLB,,, | Performed by: OBSTETRICS & GYNECOLOGY

## 2022-02-16 PROCEDURE — 3074F SYST BP LT 130 MM HG: CPT | Mod: CPTII,S$GLB,, | Performed by: OBSTETRICS & GYNECOLOGY

## 2022-02-16 PROCEDURE — 3079F PR MOST RECENT DIASTOLIC BLOOD PRESSURE 80-89 MM HG: ICD-10-PCS | Mod: CPTII,S$GLB,, | Performed by: OBSTETRICS & GYNECOLOGY

## 2022-02-16 PROCEDURE — 3074F PR MOST RECENT SYSTOLIC BLOOD PRESSURE < 130 MM HG: ICD-10-PCS | Mod: CPTII,S$GLB,, | Performed by: OBSTETRICS & GYNECOLOGY

## 2022-02-16 PROCEDURE — 3008F PR BODY MASS INDEX (BMI) DOCUMENTED: ICD-10-PCS | Mod: CPTII,S$GLB,, | Performed by: OBSTETRICS & GYNECOLOGY

## 2022-02-16 PROCEDURE — 3079F DIAST BP 80-89 MM HG: CPT | Mod: CPTII,S$GLB,, | Performed by: OBSTETRICS & GYNECOLOGY

## 2022-02-16 PROCEDURE — 3008F BODY MASS INDEX DOCD: CPT | Mod: CPTII,S$GLB,, | Performed by: OBSTETRICS & GYNECOLOGY

## 2022-02-16 PROCEDURE — 99999 PR PBB SHADOW E&M-EST. PATIENT-LVL III: CPT | Mod: PBBFAC,,, | Performed by: OBSTETRICS & GYNECOLOGY

## 2022-02-16 PROCEDURE — 1159F MED LIST DOCD IN RCRD: CPT | Mod: CPTII,S$GLB,, | Performed by: OBSTETRICS & GYNECOLOGY

## 2022-02-16 PROCEDURE — 99999 PR PBB SHADOW E&M-EST. PATIENT-LVL III: ICD-10-PCS | Mod: PBBFAC,,, | Performed by: OBSTETRICS & GYNECOLOGY

## 2022-02-16 RX ORDER — LIDOCAINE HYDROCHLORIDE 10 MG/ML
0.5 INJECTION, SOLUTION EPIDURAL; INFILTRATION; INTRACAUDAL; PERINEURAL ONCE
Status: CANCELLED | OUTPATIENT
Start: 2022-02-16 | End: 2022-02-16

## 2022-02-16 RX ORDER — HYDROCODONE BITARTRATE AND ACETAMINOPHEN 5; 325 MG/1; MG/1
1 TABLET ORAL EVERY 6 HOURS PRN
Qty: 10 TABLET | Refills: 0 | Status: SHIPPED | OUTPATIENT
Start: 2022-02-16 | End: 2022-03-31

## 2022-02-16 RX ORDER — SODIUM CHLORIDE 9 MG/ML
INJECTION, SOLUTION INTRAVENOUS CONTINUOUS
Status: CANCELLED | OUTPATIENT
Start: 2022-02-16

## 2022-02-16 RX ORDER — SODIUM CHLORIDE, SODIUM LACTATE, POTASSIUM CHLORIDE, CALCIUM CHLORIDE 600; 310; 30; 20 MG/100ML; MG/100ML; MG/100ML; MG/100ML
INJECTION, SOLUTION INTRAVENOUS CONTINUOUS
Status: CANCELLED | OUTPATIENT
Start: 2022-02-16

## 2022-02-16 RX ORDER — SCOLOPAMINE TRANSDERMAL SYSTEM 1 MG/1
1 PATCH, EXTENDED RELEASE TRANSDERMAL
Status: CANCELLED | OUTPATIENT
Start: 2022-02-16

## 2022-02-16 NOTE — H&P (VIEW-ONLY)
"          History & Physical            OBGYN    C.C Pre-op Exam      HPI : Astrid Mane is a 56 y.o. female  for evaluation and discussion of treatment options for Pre-op Exam   She has had postmenopausal bleeding.   Unable to get sufficient endometrial biopsy in the office  Past Medical History:   Diagnosis Date    Anxiety     Asthma     Breast cancer     right breast estrogen receptor positive    PMB (postmenopausal bleeding)     PONV (postoperative nausea and vomiting)     Right thyroid nodule     Vaginal atrophy      Past Surgical History:   Procedure Laterality Date    BREAST LUMPECTOMY      right breast     SECTION      total 3    HYSTEROSCOPY WITH DILATION AND CURETTAGE OF UTERUS N/A 10/21/2020    Procedure: HYSTEROSCOPY, WITH DILATION AND CURETTAGE OF UTERUS;  Surgeon: Monet Ferrer MD;  Location: [facility];  Service: OB/GYN;  Laterality: N/A;     Family History   Problem Relation Age of Onset    Asthma Mother     Coronary artery disease Father         heavy tobacco    Melanoma Maternal Grandmother 70    Ovarian cancer Neg Hx     Breast cancer Neg Hx     Cancer Neg Hx     Colon cancer Neg Hx      Social History     Tobacco Use    Smoking status: Former Smoker     Packs/day: 0.00     Years: 0.00     Pack years: 0.00     Types: Cigarettes     Quit date:      Years since quittin.1    Smokeless tobacco: Former User    Tobacco comment: Have not smoked in 30 years   Substance Use Topics    Alcohol use: Yes     Alcohol/week: 2.0 standard drinks     Types: 2 Glasses of wine per week     Comment: socially    Drug use: No     OB History    Para Term  AB Living   3 3 3         SAB IAB Ectopic Multiple Live Births                  # Outcome Date GA Lbr Marc/2nd Weight Sex Delivery Anes PTL Lv   3 Term      CS-LTranv      2 Term      CS-LTranv      1 Term      CS-LTranv          /82   Pulse 80   Ht 5' 6" (1.676 m)   LMP 2015   " BMI 28.08 kg/m²     ROS:    GENERAL: Denies weight gain or weight loss. Feeling well overall.   SKIN: Denies rash or lesions.   HEAD: Denies head injury or headache.   NODES: Denies enlarged lymph nodes.   CHEST: Denies chest pain or shortness of breath.   CARDIOVASCULAR: Denies palpitations or left sided chest pain.   ABDOMEN: No abdominal pain, constipation, diarrhea, nausea, vomiting or rectal bleeding.   URINARY: No frequency, dysuria, hematuria, or burning on urination.  REPRODUCTIVE: See HPI.   BREASTS: The patient performs breast self-examination and denies pain, lumps, or nipple discharge.   HEMATOLOGIC: No easy bruisability or excessive bleeding with the exception of menstrual cycles.  MUSCULOSKELETAL: Denies joint pain or swelling.   NEUROLOGIC: Denies syncope or weakness.   PSYCHIATRIC: Denies depression, anxiety or mood swings.    PHYSICAL EXAM:    APPEARANCE: Well nourished, well developed, in no acute distress.  AFFECT: WNL, alert and oriented x 3  SKIN: No acne or hirsutism  NECK: Neck symmetric without masses or thyromegaly  NODES: No inguinal, cervical, axillary, or femoral lymph node enlargement  CHEST: Good respiratory effect  ABDOMEN: Soft.  No tenderness or masses.  No hepatosplenomegaly.  No hernias.  PELVIC: deferred  EXTREMITIES: No edema.      FINDINGS:  Uterus:     Size: 7.4 x 5.5 x 6.3 cm     Masses: There is heterogeneity of the uterine echotexture which could be related to adenomyosis.  Previously noted uterine fibroid is not appreciated on today's examination.     Endometrium: Thickened in this post menopausal patient, measuring 13.1 mm.     Right ovary:     Not visualized.     Left ovary:     Not visualized.     Free Fluid:     None.     Impression:     Thickened endometrial stripe in this patient with postmenopausal bleeding.  Further evaluation with endometrial biopsy should be considered.     Diffuse heterogeneity of the uterine echotexture with no discrete fibroids identified on  today's examination.     Ovaries were not visualized on either side.     ASSESSMENT & PLAN:    1. Postmenopausal bleeding    2. Post-op pain    - HYDROcodone-acetaminophen (NORCO) 5-325 mg per tablet; Take 1 tablet by mouth every 6 (six) hours as needed for Pain.  Dispense: 10 tablet; Refill: 0    3. Preoperative testing    - COVID-19 Routine Screening; Future    4. History of breast cancer  5. Cervical stenosis        I have discussed the risks, benefits, indications, and alternatives of the procedure in detail.  The patient verbalizes her understanding.  All questions answered.  Consents signed.  The patient agrees to proceed to proceed as planned.

## 2022-02-16 NOTE — PROGRESS NOTES
"          History & Physical            OBGYN    C.C Pre-op Exam      HPI : Astrid Mane is a 56 y.o. female  for evaluation and discussion of treatment options for Pre-op Exam   She has had postmenopausal bleeding.   Unable to get sufficient endometrial biopsy in the office  Past Medical History:   Diagnosis Date    Anxiety     Asthma     Breast cancer     right breast estrogen receptor positive    PMB (postmenopausal bleeding)     PONV (postoperative nausea and vomiting)     Right thyroid nodule     Vaginal atrophy      Past Surgical History:   Procedure Laterality Date    BREAST LUMPECTOMY      right breast     SECTION      total 3    HYSTEROSCOPY WITH DILATION AND CURETTAGE OF UTERUS N/A 10/21/2020    Procedure: HYSTEROSCOPY, WITH DILATION AND CURETTAGE OF UTERUS;  Surgeon: Monet Ferrer MD;  Location: [facility];  Service: OB/GYN;  Laterality: N/A;     Family History   Problem Relation Age of Onset    Asthma Mother     Coronary artery disease Father         heavy tobacco    Melanoma Maternal Grandmother 70    Ovarian cancer Neg Hx     Breast cancer Neg Hx     Cancer Neg Hx     Colon cancer Neg Hx      Social History     Tobacco Use    Smoking status: Former Smoker     Packs/day: 0.00     Years: 0.00     Pack years: 0.00     Types: Cigarettes     Quit date:      Years since quittin.1    Smokeless tobacco: Former User    Tobacco comment: Have not smoked in 30 years   Substance Use Topics    Alcohol use: Yes     Alcohol/week: 2.0 standard drinks     Types: 2 Glasses of wine per week     Comment: socially    Drug use: No     OB History    Para Term  AB Living   3 3 3         SAB IAB Ectopic Multiple Live Births                  # Outcome Date GA Lbr Marc/2nd Weight Sex Delivery Anes PTL Lv   3 Term      CS-LTranv      2 Term      CS-LTranv      1 Term      CS-LTranv          /82   Pulse 80   Ht 5' 6" (1.676 m)   LMP 2015   " BMI 28.08 kg/m²     ROS:    GENERAL: Denies weight gain or weight loss. Feeling well overall.   SKIN: Denies rash or lesions.   HEAD: Denies head injury or headache.   NODES: Denies enlarged lymph nodes.   CHEST: Denies chest pain or shortness of breath.   CARDIOVASCULAR: Denies palpitations or left sided chest pain.   ABDOMEN: No abdominal pain, constipation, diarrhea, nausea, vomiting or rectal bleeding.   URINARY: No frequency, dysuria, hematuria, or burning on urination.  REPRODUCTIVE: See HPI.   BREASTS: The patient performs breast self-examination and denies pain, lumps, or nipple discharge.   HEMATOLOGIC: No easy bruisability or excessive bleeding with the exception of menstrual cycles.  MUSCULOSKELETAL: Denies joint pain or swelling.   NEUROLOGIC: Denies syncope or weakness.   PSYCHIATRIC: Denies depression, anxiety or mood swings.    PHYSICAL EXAM:    APPEARANCE: Well nourished, well developed, in no acute distress.  AFFECT: WNL, alert and oriented x 3  SKIN: No acne or hirsutism  NECK: Neck symmetric without masses or thyromegaly  NODES: No inguinal, cervical, axillary, or femoral lymph node enlargement  CHEST: Good respiratory effect  ABDOMEN: Soft.  No tenderness or masses.  No hepatosplenomegaly.  No hernias.  PELVIC: deferred  EXTREMITIES: No edema.      FINDINGS:  Uterus:     Size: 7.4 x 5.5 x 6.3 cm     Masses: There is heterogeneity of the uterine echotexture which could be related to adenomyosis.  Previously noted uterine fibroid is not appreciated on today's examination.     Endometrium: Thickened in this post menopausal patient, measuring 13.1 mm.     Right ovary:     Not visualized.     Left ovary:     Not visualized.     Free Fluid:     None.     Impression:     Thickened endometrial stripe in this patient with postmenopausal bleeding.  Further evaluation with endometrial biopsy should be considered.     Diffuse heterogeneity of the uterine echotexture with no discrete fibroids identified on  today's examination.     Ovaries were not visualized on either side.     ASSESSMENT & PLAN:    1. Postmenopausal bleeding    2. Post-op pain    - HYDROcodone-acetaminophen (NORCO) 5-325 mg per tablet; Take 1 tablet by mouth every 6 (six) hours as needed for Pain.  Dispense: 10 tablet; Refill: 0    3. Preoperative testing    - COVID-19 Routine Screening; Future    4. History of breast cancer  5. Cervical stenosis        I have discussed the risks, benefits, indications, and alternatives of the procedure in detail.  The patient verbalizes her understanding.  All questions answered.  Consents signed.  The patient agrees to proceed to proceed as planned.

## 2022-02-16 NOTE — PRE ADMISSION SCREENING
Patient had cbc drawn on 2/11/22 with [facility].  Results were all wnl and shown to Dr. Robertson.  No further orders.

## 2022-02-16 NOTE — DISCHARGE INSTRUCTIONS
Information to Prepare you for your Surgery    PRE-ADMIT TESTING -  [phone]    [address] [facility]          Your surgery has been scheduled at [facility]. We are pleased to have the opportunity to serve you. For Further Information please call [phone].    On the day of surgery please report to the Information Desk on the 1st floor.    · CONTACT YOUR PHYSICIAN'S OFFICE THE DAY PRIOR TO YOUR SURGERY TO OBTAIN YOUR ARRIVAL TIME.     · The evening before surgery do not eat anything after 9 p.m. ( this includes hard candy, chewing gum and mints).  You may only have GATORADE, POWERADE AND WATER  from 9 p.m. until you leave your home.   DO NOT DRINK ANY LIQUIDS ON THE WAY TO THE HOSPITAL.      Why does your anesthesiologist allow you to drink Gatorade/Powerade before surgery?  Gatorade/Powerade helps to increase your comfort before surgery and to decrease your nausea after surgery. The carbohydrates in Gatorade/Powerade help reduce your body's stress response to surgery.  If you are a diabetic-drink only water prior to surgery.      Current Visitor policy(12/27/2021) - Patients may have 1 adult visitor pre and post procedure. Only 1 visitor will be allowed in the Surgical building with the patient.     SPECIAL MEDICATION INSTRUCTIONS: TAKE medications checked off by the Anesthesiologist on your Medication List.    Angiogram Patients: Take medications as instructed by your physician, including aspirin.     Surgery Patients:    If you take ASPIRIN - Your PHYSICIAN/SURGEON will need to inform you IF/OR when you need to stop taking aspirin prior to your surgery.     Do Not take any medications containing IBUPROFEN.    Do Not Wear any make-up (especially eye make-up) to surgery. Please remove any false eyelashes or eyelash extensions. If you arrive the day of surgery with makeup/eyelashes on you will be required to remove prior to surgery. (There is a risk of  corneal abrasions if eye makeup/eyelash extensions are not removed)      Leave all valuables at home.   Do Not wear any jewelry or watches, including any metal in body piercings. Jewelry must be removed prior to coming to the hospital.  There is a possibility that rings that are unable to be removed may be cut off if they are on the surgical extremity.    Please remove all hair extensions, wigs, clips and any other metal accessories/ ornaments from your hair.  These items may pose a flammable/fire risk in Surgery and must be removed.    Do not shave your surgical area at least 5 days prior to your surgery. The surgical prep will be performed at the hospital according to Infection Control regulations.    Contact Lens must be removed before surgery. Either do not wear the contact lens or bring a case and solution for storage.  Please bring a container for eyeglasses or dentures as required.  Bring any paperwork your physician has provided, such as consent forms,  history and physicals, doctor's orders, etc.   Bring comfortable clothes that are loose fitting to wear upon discharge. Take into consideration the type of surgery being performed.  Maintain your diet as advised per your physician the day prior to surgery.      Adequate rest the night before surgery is advised.   Park in the Parking lot behind the hospital or in the [employer] [facility] across the street from the parking lot. Parking is complimentary.  If you will be discharged the same day as your procedure, please arrange for a responsible adult to drive you home or to accompany you if traveling by taxi.   YOU WILL NOT BE PERMITTED TO DRIVE OR TO LEAVE THE HOSPITAL ALONE AFTER SURGERY.   If you are being discharged the same day, it is strongly recommended that you arrange for someone to remain with you for the first 24 hrs following your surgery.    The Surgeon will speak to your family/visitor after your surgery regarding the outcome of your surgery and  post op care.  The Surgeon may speak to you after your surgery, but there is a possibility you may not remember the details.  Please check with your family members regarding the conversation with the Surgeon.    We strongly recommend whoever is bringing you home be present for discharge instructions.  This will ensure a thorough understanding for your post op home care.    ALL CHILDREN MUST ALWAYS BE ACCOMPANIED BY AN ADULT.    Visitors-Refer to current Visitor policy handouts.    Thank you for your cooperation.  The Staff of [facility].                Bathing Instructions with Hibiclens     Shower the evening before and morning of your procedure with Hibiclens:   Wash your face with water and your regular face wash/soap   Apply Hibiclens directly on your skin or on a wet washcloth and wash gently. When showering: Move away from the shower stream when applying Hibiclens to avoid rinsing off too soon.   Rinse thoroughly with warm water   Do not dilute Hibiclens         Dry off as usual, do not use any deodorant, powder, body lotions, perfume, after shave or cologne.

## 2022-02-16 NOTE — ANESTHESIA PREPROCEDURE EVALUATION
02/16/2022  Astrid Mane is a 56 y.o., female.    Anesthesia Evaluation    I have reviewed the Patient Summary Reports.    I have reviewed the Nursing Notes. I have reviewed the NPO Status.   I have reviewed the Medications.     Review of Systems  Anesthesia Hx:  Hx of Anesthetic complications PONV Denies Family Hx of Anesthesia complications.  Personal Hx of Anesthesia complications, Post-Operative Nausea/Vomiting, with every anesthetic, treatment not known   Social:  Non-Smoker    Hematology/Oncology:  Hematology Normal       -- Cancer in past history:  Breast   EENT/Dental:EENT/Dental Normal   Cardiovascular:  Cardiovascular Normal     Pulmonary:   Asthma    Renal/:  Renal/ Normal     Hepatic/GI:  Hepatic/GI Normal    Musculoskeletal:  Musculoskeletal Normal    Neurological:  Neurology Normal    Endocrine:  Endocrine Normal    Dermatological:  Skin Normal    Psych:  Psychiatric Normal           Physical Exam   Airway/Jaw/Neck:  Airway Findings: Mallampati: II                Anesthesia Plan  Type of Anesthesia, risks & benefits discussed:  Anesthesia Type:  general    Patient's Preference:   Plan Factors:          Intra-op Monitoring Plan:   Intra-op Monitoring Plan Comments:   Post Op Pain Control Plan: multimodal analgesia  Post Op Pain Control Plan Comments:     Induction:    Beta Blocker:         Informed Consent: Informed consent signed with the Patient and all parties understand the risks and agree with anesthesia plan.  All questions answered.  Anesthesia consent signed with patient.  ASA Score: 2     Day of Surgery Review of History & Physical:        Anesthesia Plan Notes: Devices on left        Ready For Surgery From Anesthesia Perspective.

## 2022-02-18 ENCOUNTER — PATIENT MESSAGE (OUTPATIENT)
Dept: INTERNAL MEDICINE | Facility: CLINIC | Age: 57
End: 2022-02-18
Payer: COMMERCIAL

## 2022-02-18 DIAGNOSIS — K21.9 GASTROESOPHAGEAL REFLUX DISEASE, UNSPECIFIED WHETHER ESOPHAGITIS PRESENT: ICD-10-CM

## 2022-02-18 RX ORDER — PANTOPRAZOLE SODIUM 40 MG/1
40 TABLET, DELAYED RELEASE ORAL DAILY PRN
Qty: 30 TABLET | Refills: 3 | Status: SHIPPED | OUTPATIENT
Start: 2022-02-18 | End: 2022-06-15

## 2022-02-20 ENCOUNTER — LAB VISIT (OUTPATIENT)
Dept: PRIMARY CARE CLINIC | Facility: CLINIC | Age: 57
End: 2022-02-20
Payer: COMMERCIAL

## 2022-02-20 ENCOUNTER — PATIENT MESSAGE (OUTPATIENT)
Dept: INTERNAL MEDICINE | Facility: CLINIC | Age: 57
End: 2022-02-20
Payer: COMMERCIAL

## 2022-02-20 DIAGNOSIS — Z01.818 PREOPERATIVE TESTING: ICD-10-CM

## 2022-02-20 PROCEDURE — U0003 INFECTIOUS AGENT DETECTION BY NUCLEIC ACID (DNA OR RNA); SEVERE ACUTE RESPIRATORY SYNDROME CORONAVIRUS 2 (SARS-COV-2) (CORONAVIRUS DISEASE [COVID-19]), AMPLIFIED PROBE TECHNIQUE, MAKING USE OF HIGH THROUGHPUT TECHNOLOGIES AS DESCRIBED BY CMS-2020-01-R: HCPCS | Performed by: OBSTETRICS & GYNECOLOGY

## 2022-02-22 LAB
SARS-COV-2 RNA RESP QL NAA+PROBE: NOT DETECTED
SARS-COV-2- CYCLE NUMBER: NORMAL

## 2022-02-23 ENCOUNTER — ANESTHESIA (OUTPATIENT)
Dept: SURGERY | Facility: OTHER | Age: 57
End: 2022-02-23
Payer: COMMERCIAL

## 2022-02-23 ENCOUNTER — HOSPITAL ENCOUNTER (OUTPATIENT)
Facility: OTHER | Age: 57
Discharge: HOME | End: 2022-02-23
Attending: OBSTETRICS & GYNECOLOGY | Admitting: OBSTETRICS & GYNECOLOGY
Payer: COMMERCIAL

## 2022-02-23 VITALS
HEART RATE: 82 BPM | HEIGHT: 66 IN | TEMPERATURE: 98 F | OXYGEN SATURATION: 98 % | WEIGHT: 174 LBS | SYSTOLIC BLOOD PRESSURE: 109 MMHG | DIASTOLIC BLOOD PRESSURE: 65 MMHG | RESPIRATION RATE: 16 BRPM | BODY MASS INDEX: 27.97 KG/M2

## 2022-02-23 DIAGNOSIS — Z01.818 PREOPERATIVE TESTING: ICD-10-CM

## 2022-02-23 DIAGNOSIS — N95.9 MENOPAUSAL AND POSTMENOPAUSAL DISORDER: Primary | ICD-10-CM

## 2022-02-23 DIAGNOSIS — N95.0 POSTMENOPAUSAL BLEEDING: ICD-10-CM

## 2022-02-23 LAB — POCT GLUCOSE: 101 MG/DL (ref 70–110)

## 2022-02-23 PROCEDURE — 71000033 HC RECOVERY, INTIAL HOUR: Performed by: OBSTETRICS & GYNECOLOGY

## 2022-02-23 PROCEDURE — 63600175 PHARM REV CODE 636 W HCPCS: Performed by: ANESTHESIOLOGY

## 2022-02-23 PROCEDURE — 82962 GLUCOSE BLOOD TEST: CPT | Performed by: OBSTETRICS & GYNECOLOGY

## 2022-02-23 PROCEDURE — 27201423 OPTIME MED/SURG SUP & DEVICES STERILE SUPPLY: Performed by: OBSTETRICS & GYNECOLOGY

## 2022-02-23 PROCEDURE — 25000003 PHARM REV CODE 250: Performed by: ANESTHESIOLOGY

## 2022-02-23 PROCEDURE — 58558 PR HYSTEROSCOPY,W/ENDO BX: ICD-10-PCS | Mod: ,,, | Performed by: OBSTETRICS & GYNECOLOGY

## 2022-02-23 PROCEDURE — 71000016 HC POSTOP RECOV ADDL HR: Performed by: OBSTETRICS & GYNECOLOGY

## 2022-02-23 PROCEDURE — 37000009 HC ANESTHESIA EA ADD 15 MINS: Performed by: OBSTETRICS & GYNECOLOGY

## 2022-02-23 PROCEDURE — 88305 TISSUE EXAM BY PATHOLOGIST: ICD-10-PCS | Mod: 26,,, | Performed by: PATHOLOGY

## 2022-02-23 PROCEDURE — 71000015 HC POSTOP RECOV 1ST HR: Performed by: OBSTETRICS & GYNECOLOGY

## 2022-02-23 PROCEDURE — 37000008 HC ANESTHESIA 1ST 15 MINUTES: Performed by: OBSTETRICS & GYNECOLOGY

## 2022-02-23 PROCEDURE — 58558 HYSTEROSCOPY BIOPSY: CPT | Mod: ,,, | Performed by: OBSTETRICS & GYNECOLOGY

## 2022-02-23 PROCEDURE — 36000706: Performed by: OBSTETRICS & GYNECOLOGY

## 2022-02-23 PROCEDURE — 63600175 PHARM REV CODE 636 W HCPCS: Performed by: NURSE ANESTHETIST, CERTIFIED REGISTERED

## 2022-02-23 PROCEDURE — 25000003 PHARM REV CODE 250: Performed by: NURSE ANESTHETIST, CERTIFIED REGISTERED

## 2022-02-23 PROCEDURE — 88305 TISSUE EXAM BY PATHOLOGIST: CPT | Performed by: PATHOLOGY

## 2022-02-23 PROCEDURE — 36000707: Performed by: OBSTETRICS & GYNECOLOGY

## 2022-02-23 PROCEDURE — 88305 TISSUE EXAM BY PATHOLOGIST: CPT | Mod: 26,,, | Performed by: PATHOLOGY

## 2022-02-23 RX ORDER — PROPOFOL 10 MG/ML
VIAL (ML) INTRAVENOUS
Status: DISCONTINUED | OUTPATIENT
Start: 2022-02-23 | End: 2022-02-23

## 2022-02-23 RX ORDER — OXYCODONE HYDROCHLORIDE 5 MG/1
5 TABLET ORAL
Status: DISCONTINUED | OUTPATIENT
Start: 2022-02-23 | End: 2022-02-23 | Stop reason: HOSPADM

## 2022-02-23 RX ORDER — SODIUM CHLORIDE, SODIUM LACTATE, POTASSIUM CHLORIDE, CALCIUM CHLORIDE 600; 310; 30; 20 MG/100ML; MG/100ML; MG/100ML; MG/100ML
INJECTION, SOLUTION INTRAVENOUS CONTINUOUS
Status: DISCONTINUED | OUTPATIENT
Start: 2022-02-23 | End: 2022-02-23 | Stop reason: HOSPADM

## 2022-02-23 RX ORDER — DIPHENHYDRAMINE HYDROCHLORIDE 50 MG/ML
INJECTION INTRAMUSCULAR; INTRAVENOUS
Status: DISCONTINUED | OUTPATIENT
Start: 2022-02-23 | End: 2022-02-23

## 2022-02-23 RX ORDER — ONDANSETRON 2 MG/ML
INJECTION INTRAMUSCULAR; INTRAVENOUS
Status: DISCONTINUED | OUTPATIENT
Start: 2022-02-23 | End: 2022-02-23

## 2022-02-23 RX ORDER — DIPHENHYDRAMINE HCL 25 MG
25 CAPSULE ORAL EVERY 4 HOURS PRN
Status: DISCONTINUED | OUTPATIENT
Start: 2022-02-23 | End: 2022-02-23 | Stop reason: HOSPADM

## 2022-02-23 RX ORDER — DEXMEDETOMIDINE HYDROCHLORIDE 100 UG/ML
INJECTION, SOLUTION INTRAVENOUS
Status: DISCONTINUED | OUTPATIENT
Start: 2022-02-23 | End: 2022-02-23

## 2022-02-23 RX ORDER — HYDROMORPHONE HYDROCHLORIDE 2 MG/ML
0.4 INJECTION, SOLUTION INTRAMUSCULAR; INTRAVENOUS; SUBCUTANEOUS EVERY 5 MIN PRN
Status: DISCONTINUED | OUTPATIENT
Start: 2022-02-23 | End: 2022-02-23 | Stop reason: HOSPADM

## 2022-02-23 RX ORDER — MEPERIDINE HYDROCHLORIDE 25 MG/ML
12.5 INJECTION INTRAMUSCULAR; INTRAVENOUS; SUBCUTANEOUS ONCE AS NEEDED
Status: DISCONTINUED | OUTPATIENT
Start: 2022-02-23 | End: 2022-02-23 | Stop reason: HOSPADM

## 2022-02-23 RX ORDER — DIPHENHYDRAMINE HYDROCHLORIDE 50 MG/ML
25 INJECTION INTRAMUSCULAR; INTRAVENOUS EVERY 4 HOURS PRN
Status: DISCONTINUED | OUTPATIENT
Start: 2022-02-23 | End: 2022-02-23 | Stop reason: HOSPADM

## 2022-02-23 RX ORDER — MIDAZOLAM HYDROCHLORIDE 1 MG/ML
INJECTION INTRAMUSCULAR; INTRAVENOUS
Status: DISCONTINUED | OUTPATIENT
Start: 2022-02-23 | End: 2022-02-23

## 2022-02-23 RX ORDER — PROCHLORPERAZINE EDISYLATE 5 MG/ML
5 INJECTION INTRAMUSCULAR; INTRAVENOUS EVERY 6 HOURS PRN
Status: DISCONTINUED | OUTPATIENT
Start: 2022-02-23 | End: 2022-02-23 | Stop reason: HOSPADM

## 2022-02-23 RX ORDER — DEXAMETHASONE SODIUM PHOSPHATE 4 MG/ML
INJECTION, SOLUTION INTRA-ARTICULAR; INTRALESIONAL; INTRAMUSCULAR; INTRAVENOUS; SOFT TISSUE
Status: DISCONTINUED | OUTPATIENT
Start: 2022-02-23 | End: 2022-02-23

## 2022-02-23 RX ORDER — FENTANYL CITRATE 50 UG/ML
INJECTION, SOLUTION INTRAMUSCULAR; INTRAVENOUS
Status: DISCONTINUED | OUTPATIENT
Start: 2022-02-23 | End: 2022-02-23

## 2022-02-23 RX ORDER — ONDANSETRON 8 MG/1
8 TABLET, ORALLY DISINTEGRATING ORAL EVERY 8 HOURS PRN
Status: DISCONTINUED | OUTPATIENT
Start: 2022-02-23 | End: 2022-02-23 | Stop reason: HOSPADM

## 2022-02-23 RX ORDER — SODIUM CHLORIDE 0.9 % (FLUSH) 0.9 %
3 SYRINGE (ML) INJECTION
Status: DISCONTINUED | OUTPATIENT
Start: 2022-02-23 | End: 2022-02-23 | Stop reason: HOSPADM

## 2022-02-23 RX ORDER — PHENYLEPHRINE HYDROCHLORIDE 10 MG/ML
INJECTION INTRAVENOUS
Status: DISCONTINUED | OUTPATIENT
Start: 2022-02-23 | End: 2022-02-23

## 2022-02-23 RX ORDER — HYDROCODONE BITARTRATE AND ACETAMINOPHEN 5; 325 MG/1; MG/1
1 TABLET ORAL EVERY 4 HOURS PRN
Status: DISCONTINUED | OUTPATIENT
Start: 2022-02-23 | End: 2022-02-23 | Stop reason: HOSPADM

## 2022-02-23 RX ORDER — LIDOCAINE HCL/PF 100 MG/5ML
SYRINGE (ML) INTRAVENOUS
Status: DISCONTINUED | OUTPATIENT
Start: 2022-02-23 | End: 2022-02-23

## 2022-02-23 RX ORDER — SCOLOPAMINE TRANSDERMAL SYSTEM 1 MG/1
1 PATCH, EXTENDED RELEASE TRANSDERMAL
Status: DISCONTINUED | OUTPATIENT
Start: 2022-02-23 | End: 2022-02-23 | Stop reason: HOSPADM

## 2022-02-23 RX ORDER — PROCHLORPERAZINE EDISYLATE 5 MG/ML
5 INJECTION INTRAMUSCULAR; INTRAVENOUS EVERY 30 MIN PRN
Status: DISCONTINUED | OUTPATIENT
Start: 2022-02-23 | End: 2022-02-23 | Stop reason: HOSPADM

## 2022-02-23 RX ORDER — LIDOCAINE HYDROCHLORIDE 10 MG/ML
0.5 INJECTION, SOLUTION EPIDURAL; INFILTRATION; INTRACAUDAL; PERINEURAL ONCE
Status: DISCONTINUED | OUTPATIENT
Start: 2022-02-23 | End: 2022-02-23 | Stop reason: HOSPADM

## 2022-02-23 RX ORDER — SODIUM CHLORIDE 9 MG/ML
INJECTION, SOLUTION INTRAVENOUS CONTINUOUS
Status: DISCONTINUED | OUTPATIENT
Start: 2022-02-23 | End: 2022-02-23 | Stop reason: HOSPADM

## 2022-02-23 RX ADMIN — GLYCOPYRROLATE 0.2 MG: 0.2 INJECTION, SOLUTION INTRAMUSCULAR; INTRAVITREAL at 08:02

## 2022-02-23 RX ADMIN — ONDANSETRON HYDROCHLORIDE 4 MG: 2 INJECTION INTRAMUSCULAR; INTRAVENOUS at 08:02

## 2022-02-23 RX ADMIN — OXYCODONE 5 MG: 5 TABLET ORAL at 09:02

## 2022-02-23 RX ADMIN — FENTANYL CITRATE 100 MCG: 50 INJECTION, SOLUTION INTRAMUSCULAR; INTRAVENOUS at 08:02

## 2022-02-23 RX ADMIN — LIDOCAINE HYDROCHLORIDE 100 MG: 20 INJECTION, SOLUTION INTRAVENOUS at 08:02

## 2022-02-23 RX ADMIN — PROPOFOL 200 MG: 10 INJECTION, EMULSION INTRAVENOUS at 08:02

## 2022-02-23 RX ADMIN — DEXAMETHASONE SODIUM PHOSPHATE 8 MG: 4 INJECTION, SOLUTION INTRAMUSCULAR; INTRAVENOUS at 08:02

## 2022-02-23 RX ADMIN — PHENYLEPHRINE HYDROCHLORIDE 100 MCG: 10 INJECTION INTRAVENOUS at 08:02

## 2022-02-23 RX ADMIN — DIPHENHYDRAMINE HYDROCHLORIDE 12.5 MG: 50 INJECTION, SOLUTION INTRAMUSCULAR; INTRAVENOUS at 08:02

## 2022-02-23 RX ADMIN — SCOLOPAMINE TRANSDERMAL SYSTEM 1 PATCH: 1 PATCH, EXTENDED RELEASE TRANSDERMAL at 06:02

## 2022-02-23 RX ADMIN — SODIUM CHLORIDE, SODIUM LACTATE, POTASSIUM CHLORIDE, AND CALCIUM CHLORIDE: 600; 310; 30; 20 INJECTION, SOLUTION INTRAVENOUS at 07:02

## 2022-02-23 RX ADMIN — MIDAZOLAM HYDROCHLORIDE 2 MG: 1 INJECTION, SOLUTION INTRAMUSCULAR; INTRAVENOUS at 07:02

## 2022-02-23 RX ADMIN — DEXMEDETOMIDINE HYDROCHLORIDE 12 MCG: 100 INJECTION, SOLUTION, CONCENTRATE INTRAVENOUS at 08:02

## 2022-02-23 NOTE — OR NURSING
Pt continues wihtout c/o pain at this time. No change from previous assessment. Prepared for transfer to acu.

## 2022-02-23 NOTE — ADDENDUM NOTE
Addendum  created 02/23/22 1047 by Jenny Delgado, ALEXANDER    Child order released for a procedure order, Clinical Note Signed, Intraprocedure Blocks edited, LDA created via procedure documentation, SmartForm saved

## 2022-02-23 NOTE — BRIEF OP NOTE
[facility] - Surgery ([place])  Brief Operative Note    Surgery Date: 2/23/2022     Surgeon(s) and Role:     * Monet Ferrer MD - Primary    Assisting Surgeon: None    Pre-op Diagnosis:  PMB (postmenopausal bleeding) [N95.0]  Thickened endometrium [R93.89]    Post-op Diagnosis:  Post-Op Diagnosis Codes:     * PMB (postmenopausal bleeding) [N95.0]     * Thickened endometrium [R93.89]    Procedure(s) (LRB):  HYSTEROSCOPY, WITH DILATION AND CURETTAGE OF UTERUS (N/A)    Anesthesia: General    Operative Findings: atrophic endometrium    Estimated Blood Loss: * No values recorded between 2/23/2022  8:14 AM and 2/23/2022  8:31 AM *         Specimens:   Specimen (24h ago, onward)            None            Discharge Note    OUTCOME: Patient tolerated treatment/procedure well without complication and is now ready for discharge.    DISPOSITION: Home or Self Care    FINAL DIAGNOSIS:  <principal problem not specified>    FOLLOWUP: In clinic    DISCHARGE INSTRUCTIONS:    Discharge Procedure Orders   Diet general     Pelvic Rest     Call MD for:  temperature >100.4     Call MD for:  persistent nausea and vomiting     Call MD for:  severe uncontrolled pain     Activity as tolerated

## 2022-02-23 NOTE — TRANSFER OF CARE
"Anesthesia Transfer of Care Note    Patient: Astrid Mane    Procedure(s) Performed: Procedure(s) (LRB):  HYSTEROSCOPY, WITH DILATION AND CURETTAGE OF UTERUS (N/A)    Patient location: PACU    Anesthesia Type: general    Transport from OR: Transported from OR on room air with adequate spontaneous ventilation    Post pain: adequate analgesia    Post assessment: no apparent anesthetic complications    Post vital signs: stable    Level of consciousness: awake and alert    Nausea/Vomiting: no nausea/vomiting    Complications: none    Transfer of care protocol was followed      Last vitals:   Visit Vitals  /63   Pulse 69   Temp 36.4 °C (97.6 °F) (Oral)   Resp 16   Ht 5' 6" (1.676 m)   Wt 78.9 kg (174 lb)   LMP 07/01/2015   SpO2 97%   Breastfeeding No   BMI 28.08 kg/m²     "

## 2022-02-23 NOTE — DISCHARGE INSTRUCTIONS
Home Care Instruction D&C Hysteroscopy             ACTIVITY LEVEL:  If you received sedation and/or an anesthetic, you may feel sleepy for several hours. Rest until you feel more  awake. Gradually resume your normal activities.    DIET:  At home, continue with liquids. If there is no nausea, you may eat a soft diet and gradually resume a regular diet.    BATHING:  You may shower  as desired in one day.  You should avoid tub baths, hot tubs and swimming pools until seen by your physician for a post-op follow up.    CARE:  You can expect watery or bloody vaginal discharge for several days. Do not use tampons, please only use pads. Sexual activity is restricted as advised by your doctor.    MEDICATIONS:  You will receive instructions for any pain and/or antibiotic prescriptions. Pain medication should be taken only if needed and as directed. Antibiotics, if ordered, should be taken as directed until the entire prescription is completed.    ADDITIONAL INFORMATION:  __________________________________________________________________________________________  WHEN TO CALL THE DOCTOR:   For any heavy vaginal bleeding   Fever over 101°F (38.4°C)   Any lower abdominal pain not relieved by the pain mediation    Anesthesia: After Your Surgery  Youve just had surgery. During surgery, you received medication called anesthesia to keep you comfortable and pain-free. After surgery, you may experience some pain or nausea. This is common. Here are some tips for feeling better and recovering after surgery.    Going home  Your doctor or nurse will show you how to take care of yourself when you go home. He or she will also answer your questions. Have an adult family member or friend drive you home. For the first 24 hours after your surgery:  Do not drive or use heavy equipment.  Do not make important decisions or sign legal documents.  Avoid alcohol.  Have someone stay with you, if needed. He or she can watch for problems and help keep  you safe.  Take your time getting up from a seated or lying position. You may experience dizziness for 24 hours  Be sure to keep all follow-up appointments with your doctor. And rest after your procedure for as long as your doctor tells you to.    Coping with pain  If you have pain after surgery, pain medication will help you feel better. Take it as directed, before pain becomes severe. Also, ask your doctor or pharmacist about other ways to control pain, such as with heat, ice, and relaxation. And follow any other instructions your surgeon or nurse gives you.    URINARY RETENTION  Should you experience a decrease in your urine output or are unable to urinate following surgery, this can be due to the medications given during surgery.  We recommend you going to the nearest Emergency Department.    Tips for taking pain medication  To get the best relief possible, remember these points:  Pain medications can upset your stomach. Taking them with a little food may help.  Most pain relievers taken by mouth need at least 20 to 30 minutes to take effect.  Taking medication on a schedule can help you remember to take it. Try to time your medication so that you can take it before beginning an activity, such as dressing, walking, or sitting down for dinner.  Constipation is a common side effect of pain medications. Contact your doctor before taking any medications like laxatives or stool softeners to help relieve constipation. Also ask about any dietary restrictions, because drinking lots of fluids and eating foods like fruits and vegetables that are high in fiber can also help. Remember, dont take laxatives unless your surgeon has prescribed them.  Mixing alcohol and pain medication can cause dizziness and slow your breathing. It can even be fatal. Dont drink alcohol while taking pain medication.  Pain medication can slow your reflexes. Dont drive or operate machinery while taking pain medication.  If your health care  provider tells you to take acetaminophen to help relieve your pain, ask him or her how much you are supposed to take each day. (Acetaminophen is the generic name for Tylenol and other brand-name pain relievers.) Acetaminophen or other pain relievers may interact with your prescription medicines or other over-the-counter (OTC) drugs. Some prescription medications contain acetaminophen along with other active ingredients. Using both prescription and OTC acetaminophen for pain can cause you to overdose. The FDA recommends that you read the labels on your OTC medications carefully. This will help you to clearly understand the list of active ingredients, dosing instructions, and any warnings. It may also help you avoid taking too much acetaminophen. If you have questions or don't understand the information, ask your pharmacist or health care provider to explain it to you before you take the OTC medication.    Managing nausea  Some people have an upset stomach after surgery. This is often due to anesthesia, pain, pain medications, or the stress of surgery. The following tips will help you manage nausea and get good nutrition as you recover. If you were on a special diet before surgery, ask your doctor if you should follow it during recovery. These tips may help:  Dont push yourself to eat. Your body will tell you when to eat and how much.  Start off with clear liquids and soup. They are easier to digest.  Progress to semi-solid foods (mashed potatoes, applesauce, and gelatin) as you feel ready.  Slowly move to solid foods. Dont eat fatty, rich, or spicy foods at first.  Dont force yourself to have three large meals a day. Instead, eat smaller amounts more often.  Take pain medications with a small amount of solid food, such as crackers or toast to avoid nausea.      Call your surgeon if    You feel too sleepy, dizzy, or groggy (medication may be too strong).  You have side effects like nausea, vomiting, or skin  changes (rash, itching, or hives).   © 4364-0998 [employer]. [address] [place] [address], [place] [address]. All rights reserved. This information is not intended as a substitute for professional medical care. Always follow your healthcare professional's instructions.

## 2022-02-23 NOTE — PLAN OF CARE
Astrid Singhxiomy has met all discharge criteria from Phase II. Vital Signs are stable, ambulating  without difficulty. Discharge instructions given, patient verbalized understanding. Discharged from facility via wheelchair in stable condition.

## 2022-02-23 NOTE — OP NOTE
Operative Report    Procedure: Dilation and Curettage, Hysteroscopy    Date of Surgery: 2/23/2022    Pre-Op Diagnosis: PMB (postmenopausal bleeding) [N95.0]  Thickened endometrium [R93.89]  Preoperative testing [Z01.818]    Post-op Diagnosis: PMB (postmenopausal bleeding) [N95.0]  Thickened endometrium [R93.89]  Preoperative testing [Z01.818]       Surgeon: Monet Ferrer MD    Assistant: none    Anesthesia: General    EBL: less than 100     IVF:           Total: 400cc    Urine Output: 30cc    Specimen: endometrial curretage    Findings: Uterus sounded to 9, cervical os dilated to 15 by the Kun Dilators.  Hysteroscopic findings include: atrophic appearing endometrium. Bilateral tubal ostia visualized.  Specimens obtained and sent to pathology.      Procedure in Detail:  The patient was taken to the Operating Room where general was obtained, the patient was placed in the supine position, with her legs in the yellow fin stirrups.  The patient was then prepped and draped in the normal sterile fashion.  The weighted speculum was placed in the posterior aspect of the vagina and the right angle retractor was placed in the anterior aspect of the vagina.  The cervix was visualized and a single-tooth tenaculum was placed on the anterior aspect of the cervix.  The uterus was sounded to 9 cm with the uterine sound.    The hysteroscope was then white balanced and the line cleared of air, it was then inserted into the cervical os and the uterine cavity was directly visualized, no polyps, no fibroids were noted at that time.  The hysteroscope was then removed and currettage of the uterus of the uterus was performed and specimens were sent to pathology for evaluation.Hysteroscope was reintroduced. No evidence of perforation was appreciated.   The single tooth tenaculum was then removed and the cervix was made hemostatic with pressure.  Sponge, lap and needle counts were correct x 2.    The patient tolerated the procedure  well and was taken to recovery in stable condition.

## 2022-02-23 NOTE — ANESTHESIA PROCEDURE NOTES
Intubation    Date/Time: 2/23/2022 8:02 AM  Performed by: Jenny Delgado CRNA  Authorized by: Guilherme Crandall MD     Intubation:     Induction:  Intravenous    Intubated:  Postinduction    Mask Ventilation:  Easy mask    Attempts:  1    Attempted By:  CRNA    Difficult Airway Encountered?: No      Complications:  None    Airway Device:  Supraglottic airway/LMA    Airway Device Size:  4.0    Style/Cuff Inflation:  Cuffed    Secured at:  The lips    Placement Verified By:  Capnometry    Complicating Factors:  None    Findings Post-Intubation:  BS equal bilateral and atraumatic/condition of teeth unchanged

## 2022-02-23 NOTE — ANESTHESIA POSTPROCEDURE EVALUATION
Anesthesia Post Evaluation    Patient: Astrid Mane    Procedure(s) Performed: Procedure(s) (LRB):  HYSTEROSCOPY, WITH DILATION AND CURETTAGE OF UTERUS (N/A)    Final Anesthesia Type: general      Patient location during evaluation: PACU  Patient participation: Yes- Able to Participate  Level of consciousness: awake and alert  Post-procedure vital signs: reviewed and stable  Pain management: adequate  Airway patency: patent    PONV status at discharge: No PONV  Anesthetic complications: no      Cardiovascular status: blood pressure returned to baseline  Respiratory status: unassisted and room air  Hydration status: euvolemic  Follow-up not needed.          Vitals Value Taken Time   /65 02/23/22 0909   Temp 36.4 °C (97.6 °F) 02/23/22 0904   Pulse 82 02/23/22 0909   Resp 15 02/23/22 0929   SpO2 95 % 02/23/22 0909   Vitals shown include unvalidated device data.      Event Time   Out of Recovery 09:12:01         Pain/Mera Score: Pain Rating Prior to Med Admin: 5 (2/23/2022  9:29 AM)  Mera Score: 9 (2/23/2022  9:03 AM)

## 2022-02-24 ENCOUNTER — TELEPHONE (OUTPATIENT)
Dept: OBSTETRICS AND GYNECOLOGY | Facility: CLINIC | Age: 57
End: 2022-02-24
Payer: COMMERCIAL

## 2022-02-24 NOTE — TELEPHONE ENCOUNTER
Called pt to check on her after surgery. Pt states she is feeling good just a little cramping but no bleeding. Scheduled pt for po appointment on 3/31. Advised pt if any changes to call the office. Pt verbalized understanding

## 2022-03-02 LAB
FINAL PATHOLOGIC DIAGNOSIS: NORMAL
GROSS: NORMAL
Lab: NORMAL

## 2022-03-07 DIAGNOSIS — E87.5 HYPERKALEMIA: Primary | ICD-10-CM

## 2022-03-08 ENCOUNTER — TELEPHONE (OUTPATIENT)
Dept: INTERNAL MEDICINE | Facility: CLINIC | Age: 57
End: 2022-03-08
Payer: COMMERCIAL

## 2022-03-08 NOTE — TELEPHONE ENCOUNTER
----- Message from Javier Osborne MD sent at 3/7/2022  5:08 PM CST -----  Please let pt know that the potassium was elevated on her blood draw  Often is just a lab error.  Please ask her to have it repeated Tuesday if possible.    Thanks,  ABDULLAHI (covering provider for Dr. Abdul)

## 2022-03-18 LAB
ALBUMIN: 4.8 G/DL (ref 3.5–5)
ALP SERPL-CCNC: 76 U/L (ref 38–126)
ALT SERPL W P-5'-P-CCNC: 42 U/L (ref 7–56)
ANION GAP SERPL CALC-SCNC: 16.2 MMOL/L (ref 9–18)
AST SERPL-CCNC: 31 U/L (ref 7–40)
BILIRUB SERPL-MCNC: 0.5 MG/DL (ref 0–1.2)
BUN BLD-MCNC: 10 MG/DL (ref 7–21)
BUN/CREAT SERPL: 14 (ref 6–22)
CALC OSMOLALITY: 279 MOSM/KG (ref 275–295)
CALCIUM SERPL-MCNC: 9.5 MG/DL (ref 8.5–10.4)
CHLORIDE SERPL-SCNC: 104 MMOL/L (ref 98–107)
CO2 SERPL-SCNC: 25 MMOL/L (ref 21–31)
CREAT SERPL-MCNC: 0.7 MG/DL (ref 0.5–1)
EGFR: 112 ML/MIN
GFR: 97 ML/MIN
GLUCOSE SERPL-MCNC: 101 MG/DL (ref 70–100)
POTASSIUM SERPL-SCNC: 5.2 MMOL/L (ref 3.5–5)
SODIUM BLD-SCNC: 140 MMOL/L (ref 135–145)
TOTAL PROTEIN: 6.8 G/DL (ref 6.3–8.2)

## 2022-03-29 NOTE — TELEPHONE ENCOUNTER
Could we schedule an earlier visit to discuss her potassium level?  It can be in-person or virtual, but please change the appt length to be 30 minutes long. Her potassium level is better, but is still a little high.  I'd like to discuss this with her to determine the next steps.  Thanks!

## 2022-03-31 ENCOUNTER — PATIENT MESSAGE (OUTPATIENT)
Dept: INTERNAL MEDICINE | Facility: CLINIC | Age: 57
End: 2022-03-31
Payer: COMMERCIAL

## 2022-03-31 ENCOUNTER — OFFICE VISIT (OUTPATIENT)
Dept: OBSTETRICS AND GYNECOLOGY | Facility: CLINIC | Age: 57
End: 2022-03-31
Attending: OBSTETRICS & GYNECOLOGY
Payer: COMMERCIAL

## 2022-03-31 VITALS
DIASTOLIC BLOOD PRESSURE: 81 MMHG | SYSTOLIC BLOOD PRESSURE: 119 MMHG | HEIGHT: 66 IN | BODY MASS INDEX: 29.41 KG/M2 | WEIGHT: 183 LBS

## 2022-03-31 DIAGNOSIS — N95.0 POSTMENOPAUSAL BLEEDING: Primary | ICD-10-CM

## 2022-03-31 DIAGNOSIS — Z85.3 HISTORY OF BREAST CANCER: ICD-10-CM

## 2022-03-31 PROCEDURE — 3008F PR BODY MASS INDEX (BMI) DOCUMENTED: ICD-10-PCS | Mod: CPTII,S$GLB,, | Performed by: OBSTETRICS & GYNECOLOGY

## 2022-03-31 PROCEDURE — 3079F DIAST BP 80-89 MM HG: CPT | Mod: CPTII,S$GLB,, | Performed by: OBSTETRICS & GYNECOLOGY

## 2022-03-31 PROCEDURE — 3074F SYST BP LT 130 MM HG: CPT | Mod: CPTII,S$GLB,, | Performed by: OBSTETRICS & GYNECOLOGY

## 2022-03-31 PROCEDURE — 1159F MED LIST DOCD IN RCRD: CPT | Mod: CPTII,S$GLB,, | Performed by: OBSTETRICS & GYNECOLOGY

## 2022-03-31 PROCEDURE — 3079F PR MOST RECENT DIASTOLIC BLOOD PRESSURE 80-89 MM HG: ICD-10-PCS | Mod: CPTII,S$GLB,, | Performed by: OBSTETRICS & GYNECOLOGY

## 2022-03-31 PROCEDURE — 3074F PR MOST RECENT SYSTOLIC BLOOD PRESSURE < 130 MM HG: ICD-10-PCS | Mod: CPTII,S$GLB,, | Performed by: OBSTETRICS & GYNECOLOGY

## 2022-03-31 PROCEDURE — 3044F PR MOST RECENT HEMOGLOBIN A1C LEVEL <7.0%: ICD-10-PCS | Mod: CPTII,S$GLB,, | Performed by: OBSTETRICS & GYNECOLOGY

## 2022-03-31 PROCEDURE — 99213 PR OFFICE/OUTPT VISIT, EST, LEVL III, 20-29 MIN: ICD-10-PCS | Mod: S$GLB,,, | Performed by: OBSTETRICS & GYNECOLOGY

## 2022-03-31 PROCEDURE — 3008F BODY MASS INDEX DOCD: CPT | Mod: CPTII,S$GLB,, | Performed by: OBSTETRICS & GYNECOLOGY

## 2022-03-31 PROCEDURE — 99999 PR PBB SHADOW E&M-EST. PATIENT-LVL III: CPT | Mod: PBBFAC,,, | Performed by: OBSTETRICS & GYNECOLOGY

## 2022-03-31 PROCEDURE — 1159F PR MEDICATION LIST DOCUMENTED IN MEDICAL RECORD: ICD-10-PCS | Mod: CPTII,S$GLB,, | Performed by: OBSTETRICS & GYNECOLOGY

## 2022-03-31 PROCEDURE — 99999 PR PBB SHADOW E&M-EST. PATIENT-LVL III: ICD-10-PCS | Mod: PBBFAC,,, | Performed by: OBSTETRICS & GYNECOLOGY

## 2022-03-31 PROCEDURE — 99213 OFFICE O/P EST LOW 20 MIN: CPT | Mod: S$GLB,,, | Performed by: OBSTETRICS & GYNECOLOGY

## 2022-03-31 PROCEDURE — 3044F HG A1C LEVEL LT 7.0%: CPT | Mod: CPTII,S$GLB,, | Performed by: OBSTETRICS & GYNECOLOGY

## 2022-03-31 RX ORDER — PHENDIMETRAZINE TARTRATE 35 MG/1
1 TABLET ORAL
COMMUNITY
Start: 2022-03-18

## 2022-03-31 NOTE — PROGRESS NOTES
CC: Postop visit    Astrid Mane is a 56 y.o. female  post-op from a D&C/hystercoscopy on 2022.  Patient is doing well postoperatively.  No pain.  No bowel or bladder complaints.  No fever.      The pathology revealed:  benign inactive endoemtrirum    Past Medical History:   Diagnosis Date    Anxiety     Asthma     Breast cancer     right breast estrogen receptor positive    PMB (postmenopausal bleeding)     PONV (postoperative nausea and vomiting)     Right thyroid nodule     Vaginal atrophy      Past Surgical History:   Procedure Laterality Date    BREAST LUMPECTOMY      right breast     SECTION      total 3    DILATION AND CURETTAGE OF UTERUS      HYSTEROSCOPY WITH DILATION AND CURETTAGE OF UTERUS N/A 10/21/2020    Procedure: HYSTEROSCOPY, WITH DILATION AND CURETTAGE OF UTERUS;  Surgeon: Monet Ferrer MD;  Location: [facility];  Service: OB/GYN;  Laterality: N/A;    HYSTEROSCOPY WITH DILATION AND CURETTAGE OF UTERUS N/A 2022    Procedure: HYSTEROSCOPY, WITH DILATION AND CURETTAGE OF UTERUS;  Surgeon: Monet Ferrer MD;  Location: [facility];  Service: OB/GYN;  Laterality: N/A;     Family History   Problem Relation Age of Onset    Asthma Mother     Coronary artery disease Father         heavy tobacco    Melanoma Maternal Grandmother 70    Ovarian cancer Neg Hx     Breast cancer Neg Hx     Cancer Neg Hx     Colon cancer Neg Hx      Social History     Tobacco Use    Smoking status: Former Smoker     Packs/day: 0.00     Years: 0.00     Pack years: 0.00     Types: Cigarettes     Quit date:      Years since quittin.2    Smokeless tobacco: Former User    Tobacco comment: Have not smoked in 30 years   Substance Use Topics    Alcohol use: Yes     Alcohol/week: 2.0 standard drinks     Types: 2 Glasses of wine per week     Comment: socially    Drug use: No     OB History    Para Term  AB Living   3 3 3         SAB IAB Ectopic  "Multiple Live Births                  # Outcome Date GA Lbr Marc/2nd Weight Sex Delivery Anes PTL Lv   3 Term      CS-LTranv      2 Term      CS-LTranv      1 Term      CS-LTranv          /81   Ht 5' 6" (1.676 m)   Wt 83 kg (183 lb)   LMP 07/01/2015   BMI 29.54 kg/m²     ROS:  GENERAL: No fever, chills, fatigability or weight loss.  VULVAR: No pain, no lesions and no itching.  VAGINAL: No relaxation, no itching, no discharge, no abnormal bleeding and no lesions.  ABDOMEN: No abdominal pain. Denies nausea. Denies vomiting. No diarrhea. No constipation  BREAST: Denies pain. No lumps. No discharge.  URINARY: No incontinence, no nocturia, no frequency and no dysuria.  CARDIOVASCULAR: No chest pain. No shortness of breath. No leg cramps.  NEUROLOGICAL: No headaches. No vision changes.    PE:   ABDOMEN:  Soft, non-tender, non-distended.  PELVIC: Normal external genitalia without lesions.  Normal hair distribution.  Adequate perineal body, normal urethral meatus.  Vagina moist and well rugated without lesions or discharge.  Cervix pink, without lesions, discharge or tenderness.  No significant cystocele or rectocele.  Bimanual exam shows uterus to be normal size, regular, mobile and nontender.  Adnexa without masses or tenderness.      ASSESSMENT  Postmenopausal bleeding  History of breast cancer      PLAN  Counseled patient that if she has additional bleeding episodes would recommend definitive treatment with hyst          "

## 2022-04-01 DIAGNOSIS — E78.2 MIXED HYPERLIPIDEMIA: ICD-10-CM

## 2022-04-01 RX ORDER — ATORVASTATIN CALCIUM 20 MG/1
TABLET, FILM COATED ORAL
Qty: 90 TABLET | Refills: 3 | Status: SHIPPED | OUTPATIENT
Start: 2022-04-01 | End: 2023-06-21

## 2022-04-01 NOTE — TELEPHONE ENCOUNTER
No new care gaps identified.  Powered by [employer]. Reference number: [id number].   4/01/2022 8:10:25 AM CDT

## 2022-04-01 NOTE — TELEPHONE ENCOUNTER
This Rx Request does not qualify for assessment with the [facility]   Please review protocol details and the Care Due Message for extra clinical information    Reasons Rx Request may be deferred:  Patient has been seen in the ED/Hospital since the last PCP visit    Note composed:11:35 AM 04/01/2022

## 2022-04-07 ENCOUNTER — PATIENT MESSAGE (OUTPATIENT)
Dept: INTERNAL MEDICINE | Facility: CLINIC | Age: 57
End: 2022-04-07
Payer: COMMERCIAL

## 2022-05-04 ENCOUNTER — OFFICE VISIT (OUTPATIENT)
Dept: INTERNAL MEDICINE | Facility: CLINIC | Age: 57
End: 2022-05-04
Payer: COMMERCIAL

## 2022-05-04 DIAGNOSIS — E87.5 HYPERKALEMIA: Primary | ICD-10-CM

## 2022-05-04 PROCEDURE — 99215 OFFICE O/P EST HI 40 MIN: CPT | Mod: 95,,, | Performed by: INTERNAL MEDICINE

## 2022-05-04 PROCEDURE — 1160F RVW MEDS BY RX/DR IN RCRD: CPT | Mod: CPTII,95,, | Performed by: INTERNAL MEDICINE

## 2022-05-04 PROCEDURE — 3044F HG A1C LEVEL LT 7.0%: CPT | Mod: CPTII,95,, | Performed by: INTERNAL MEDICINE

## 2022-05-04 PROCEDURE — 3044F PR MOST RECENT HEMOGLOBIN A1C LEVEL <7.0%: ICD-10-PCS | Mod: CPTII,95,, | Performed by: INTERNAL MEDICINE

## 2022-05-04 PROCEDURE — 99215 PR OFFICE/OUTPT VISIT, EST, LEVL V, 40-54 MIN: ICD-10-PCS | Mod: 95,,, | Performed by: INTERNAL MEDICINE

## 2022-05-04 PROCEDURE — 1159F PR MEDICATION LIST DOCUMENTED IN MEDICAL RECORD: ICD-10-PCS | Mod: CPTII,95,, | Performed by: INTERNAL MEDICINE

## 2022-05-04 PROCEDURE — 1160F PR REVIEW ALL MEDS BY PRESCRIBER/CLIN PHARMACIST DOCUMENTED: ICD-10-PCS | Mod: CPTII,95,, | Performed by: INTERNAL MEDICINE

## 2022-05-04 PROCEDURE — 1159F MED LIST DOCD IN RCRD: CPT | Mod: CPTII,95,, | Performed by: INTERNAL MEDICINE

## 2022-05-04 NOTE — PROGRESS NOTES
The patient location is: [place]  The chief complaint leading to consultation is: lab review    Visit type: Virtual visit with synchronous audio and video  Contact time spent with patient: 20 minutes  Each patient to whom he or she provides medical services by telemedicine is:  (1) informed of the relationship between the physician and patient and the respective role of any other health care provider with respect to management of the patient; and (2) notified that he or she may decline to receive medical services by telemedicine and may withdraw from such care at any time.    Subjective:       Patient ID: Astrid Mane is a 56 y.o. female who  has a past medical history of Anxiety, Asthma, Breast cancer, PMB (postmenopausal bleeding), PONV (postoperative nausea and vomiting), Right thyroid nodule, and Vaginal atrophy.    Chief Complaint: lab review     History was obtained from the patient and supplemented through chart review  S/p hysteroscopy    Her son in law is a  PCP with [employer] at the [facility] location, [facility]. She is a homemaker.    HPI    HyperK:  BMP 2/1/22 was normal.  Had OSH routine labs 2/11/22 before her hysteroscopy c K 5.4. Normal renal function. AG 20. Repeat K 5.2 3/18/22. Normal renal function, anion gap. S/p hysteroscopy 2/23.     Was not taking NSAIDs before her hysteroscopy.  Did take NSAIDs for cramps after the hysteroscopy.    Takes Mg (without potassium), glucosamine chrondroitin, Move Free (for joints, containing cartilage), CoQ10. No other MVI.  Otherwise, no offending medications.    Had a nausea patch before and after her hysteroscopy. Doesn't recall severe nausea or diarrhea at the time. Normal po intake.  Denies recent illness. Has some nausea, loose stools from Ozempic that is manageable, not bothersome.    Can have difficulty with venapuncture, but the last 2 lab draws were not particularly difficult.              Not addressed today.  Asthma:  Flares 3/year.  Uses rescue  inhaler 4/year. Uses albuterol inhaler mainly during the winter.  No issues this past season.  Doing well.  Continue rescue inhaler p.r.n..    Snoring:    Snoring all her life.  Difficulty falling back asleep after her  wakes up.  He doesn't like the AC on and she likes it cold d/t hot flashes.  No apnea, daytime fatigue.  Does feel refreshed after adequate sleep.  No HTN, obesity. +overweight.  STOP BAN, low risk.    STOP BANG low risk. Has minimal sx, so she declines PSG/CAITLIN eval.    HLD:    Is currently taking Lipitor 20.   FLP controlled.  Cont Lipitor 20. Monitor FLP annually.  Lab Results   Component Value Date    LDLCALC 78 2022     The 10-year ASCVD risk score (Car RUDOLPH Jr., et al., 2013) is: 1.1%    Values used to calculate the score:      Age: 56 years      Sex: Female      Is Non- : No      Diabetic: No      Tobacco smoker: No      Systolic Blood Pressure: 119 mmHg      Is BP treated: No      HDL Cholesterol: 94 mg/dL      Total Cholesterol: 185 mg/dL    Hypothyroidism, thyroid nodule:    Was initially discovered on breast MRI. OSH thyroid ultrasound  with 2 cm nodule in the lower lobe of the right lobe of the thyroid.  Tirads score 4, which is moderately suspicious.  0.5 cm nodule in the midpole of the right thyroid lobe, T rad score 3, mildly suspicious.  Also with 0.3 cm in the isthmus.    OSH FNA 2019 was benign.      OSH thyroid ultrasound at [facility]  with thyroid nodules.  Largest 1.8 cm. Did not meet criteria for FNA.  Recommend monitoring and 1, 3, 5 years.    Not on Synthroid.  No compressive sx. TSH 22 wnl.  Ultrasound 2020 with small nodule, stable. Will repeat thyroid U/S in 1 year. FNA  benign. Follows c [facility] at [facility]. Monitor TSH annually.  Lab Results   Component Value Date    TSH 1.57 2022    FREET4 0.87 2018     Obesity:    [place] denied for Trulicity.  On Ozempic for weight loss.  Is able to get this from   [facility] in [place], who can order from [employer] which is cheaper.  Has some nausea, loose stool, but this is tolerable.    Exercise: walks 3 miles/day 4-5/week, weights 2-3/week.   ETOH: drinks   Stable. Doing well with exercise. Cont Ozempic for weight.   BMI Readings from Last 3 Encounters:   03/31/22 29.54 kg/m²   02/22/22 28.08 kg/m²   02/16/22 28.08 kg/m²     Lab Results   Component Value Date/Time    HGBA1C 5.3 02/11/2022 07:34 AM    HGBA1C 5.0 10/14/2020 07:50 AM     AR:  Causes headache.  Takes Mucinex, claritin daily.    Situational Anxiety:  On clonidine daily for hot flashes.  Has Xanax when flying.  Takes it 6 times a year.   reviewed.      Hot flashes:  Is on Clonidine 0.1, Effexor.  Follows with OBGYN.    Postmenopausal bleeding, fibroids:  x 3 years.  Following with OBGYN.  S/p hysteroscopy, D&C on 10/21/2020.  Pathology was benign. Repeat U/S c thickened endometrial stripe.  S/p repeat hysteroscopy.  If recurrent bleeding, would recommend hysterectomy.    GERD:  Flared lately.  Rx Trial of PPI qAM x 2 wks, then PRN.     Colon polyp:  C scope with [facility]  with sessile polyp.  No biopsy report, but reportedly neg.  Planning to repeat in 5 years, 2023.    History of Breast cancer:  Follows with oncology. S/p lumpectomy.  Completed radiation, tamoxifen.  ANNA. Has mammograms at [facility], alternating with MRI q.6 months.  No acute issues. F/u c Oncology with breast imaging q6 mo.     Review of Systems   Constitutional: Negative for activity change, appetite change and unexpected weight change.   HENT: Negative for hearing loss, rhinorrhea and trouble swallowing.    Eyes: Negative for discharge and visual disturbance.   Respiratory: Negative for chest tightness and wheezing.    Cardiovascular: Negative for chest pain and palpitations.   Gastrointestinal: Negative for blood in stool, constipation, diarrhea, nausea and vomiting.   Endocrine: Negative for polydipsia and polyuria.   Genitourinary: Negative for  difficulty urinating, dysuria, hematuria and menstrual problem.   Musculoskeletal: Negative for arthralgias, joint swelling and neck pain.   Neurological: Negative for weakness and headaches.   Psychiatric/Behavioral: Negative for confusion and dysphoric mood.       I personally reviewed Past Medical History, Past Surgical History, Social History, and Family History.    Objective:      Vitals:      Physical Exam  Constitutional:       General: She is not in acute distress.     Appearance: She is well-developed. She is not diaphoretic.   HENT:      Head: Normocephalic and atraumatic.   Eyes:      General:         Right eye: No discharge.         Left eye: No discharge.   Pulmonary:      Effort: Pulmonary effort is normal. No respiratory distress.   Skin:     Coloration: Skin is not pale.      Findings: No erythema.   Neurological:      Mental Status: She is alert.   Psychiatric:         Behavior: Behavior normal.           Lab Results   Component Value Date    WBC 4.3 (L) 02/11/2022    HGB 15.1 02/11/2022    HCT 45.8 02/11/2022     02/11/2022    CHOL 185 02/11/2022    TRIG 82 02/11/2022    HDL 94 (H) 02/11/2022    ALT 42 03/18/2022    AST 31 03/18/2022     03/18/2022    K 5.2 (H) 03/18/2022     03/18/2022    CREATININE 0.70 03/18/2022    BUN 10.0 03/18/2022    CO2 25 03/18/2022    TSH 1.57 02/11/2022    HGBA1C 5.3 02/11/2022       The 10-year ASCVD risk score (Car KLAUDIA Jr., et al., 2013) is: 1.1%    Values used to calculate the score:      Age: 56 years      Sex: Female      Is Non- : No      Diabetic: No      Tobacco smoker: No      Systolic Blood Pressure: 119 mmHg      Is BP treated: No      HDL Cholesterol: 94 mg/dL      Total Cholesterol: 185 mg/dL    (Imaging have been independently reviewed)  Pelvic ultrasound with thickened endometrial stripe, fibroids.    Assessment:       1. Hyperkalemia        Plan:       Astrid was seen today for lab review.    Diagnoses and all  orders for this visit:    Hyperkalemia  Comments:  Improved, but persistent. Repeat BMP as NSAIDs, nausea during hysteroscopy may have contributed. If persistent, check urine lytes, screen for hypoaldosteronism  Orders:  -     Basic Metabolic Panel; Future  -     Basic Metabolic Panel         Side effects of medication(s) were discussed in detail and patient voiced understanding.  Patient will call back for any issues or complications.     I have spent a total of 40 minutes with the patient as well as reviewing the chart/medical record and placing orders on the day of the visit. Discussed lab results, hyperkalemia.     RTC in 2 month(s) or sooner PRN for annual, healthcare maintenance.

## 2022-05-18 LAB
ANION GAP SERPL CALC-SCNC: 15.8 MMOL/L (ref 9–18)
BUN BLD-MCNC: 12 MG/DL (ref 7–21)
BUN/CREAT SERPL: 24 (ref 6–22)
CALC OSMOLALITY: 282 MOSM/KG (ref 275–295)
CALCIUM SERPL-MCNC: 9.1 MG/DL (ref 8.5–10.4)
CHLORIDE SERPL-SCNC: 105 MMOL/L (ref 98–107)
CO2 SERPL-SCNC: 26 MMOL/L (ref 21–31)
CREAT SERPL-MCNC: 0.5 MG/DL (ref 0.5–1)
EGFR: 125 ML/MIN
GFR: 108 ML/MIN
GLUCOSE SERPL-MCNC: 86 MG/DL (ref 70–100)
POTASSIUM SERPL-SCNC: 4.8 MMOL/L (ref 3.5–5)
SODIUM BLD-SCNC: 142 MMOL/L (ref 135–145)

## 2022-06-09 ENCOUNTER — PATIENT MESSAGE (OUTPATIENT)
Dept: INTERNAL MEDICINE | Facility: CLINIC | Age: 57
End: 2022-06-09
Payer: COMMERCIAL

## 2022-06-09 ENCOUNTER — PATIENT MESSAGE (OUTPATIENT)
Dept: OBSTETRICS AND GYNECOLOGY | Facility: CLINIC | Age: 57
End: 2022-06-09
Payer: COMMERCIAL

## 2022-06-09 DIAGNOSIS — R30.0 DYSURIA: Primary | ICD-10-CM

## 2022-06-09 NOTE — TELEPHONE ENCOUNTER
Allergies reviewed, pt just complaining of painful urination, requesting nitrofurantoin mono/Mac 100 mg twice a day for 5 days

## 2022-06-15 DIAGNOSIS — K21.9 GASTROESOPHAGEAL REFLUX DISEASE, UNSPECIFIED WHETHER ESOPHAGITIS PRESENT: ICD-10-CM

## 2022-06-15 RX ORDER — PANTOPRAZOLE SODIUM 40 MG/1
40 TABLET, DELAYED RELEASE ORAL DAILY PRN
Qty: 90 TABLET | Refills: 3 | Status: SHIPPED | OUTPATIENT
Start: 2022-06-15 | End: 2023-06-16

## 2022-06-15 NOTE — TELEPHONE ENCOUNTER
No new care gaps identified.  [facility] Embedded Care Gaps. Reference number: [id number]. 6/15/2022   3:31:20 PM CDT

## 2022-06-15 NOTE — TELEPHONE ENCOUNTER
Refill Decision Note   Astrid Mane  is requesting a refill authorization.  Brief Assessment and Rationale for Refill:  Approve     Medication Therapy Plan:       Medication Reconciliation Completed: No   Comments:     No Care Gaps recommended.     Note composed:3:36 PM 06/15/2022

## 2022-07-26 ENCOUNTER — PATIENT MESSAGE (OUTPATIENT)
Dept: INTERNAL MEDICINE | Facility: CLINIC | Age: 57
End: 2022-07-26
Payer: COMMERCIAL

## 2022-08-16 ENCOUNTER — TELEPHONE (OUTPATIENT)
Dept: HEMATOLOGY/ONCOLOGY | Facility: CLINIC | Age: 57
End: 2022-08-16
Payer: COMMERCIAL

## 2022-09-14 ENCOUNTER — OFFICE VISIT (OUTPATIENT)
Dept: INTERNAL MEDICINE | Facility: CLINIC | Age: 57
End: 2022-09-14
Payer: COMMERCIAL

## 2022-09-14 DIAGNOSIS — Z00.00 ANNUAL PHYSICAL EXAM: Primary | ICD-10-CM

## 2022-09-14 DIAGNOSIS — E66.9 OBESITY (BMI 30.0-34.9): ICD-10-CM

## 2022-09-14 DIAGNOSIS — E04.1 RIGHT THYROID NODULE: ICD-10-CM

## 2022-09-14 DIAGNOSIS — Z23 HIGH PRIORITY FOR COVID-19 VACCINATION: ICD-10-CM

## 2022-09-14 DIAGNOSIS — Z23 NEED FOR TETANUS, DIPHTHERIA, AND ACELLULAR PERTUSSIS (TDAP) VACCINE: ICD-10-CM

## 2022-09-14 DIAGNOSIS — Z23 INFLUENZA VACCINE NEEDED: ICD-10-CM

## 2022-09-14 DIAGNOSIS — E78.2 MIXED HYPERLIPIDEMIA: ICD-10-CM

## 2022-09-14 DIAGNOSIS — Z23 NEED FOR PNEUMOCOCCAL VACCINE: ICD-10-CM

## 2022-09-14 PROCEDURE — 99999 PR PBB SHADOW E&M-EST. PATIENT-LVL IV: ICD-10-PCS | Mod: PBBFAC,,, | Performed by: INTERNAL MEDICINE

## 2022-09-14 PROCEDURE — 99999 PR PBB SHADOW E&M-EST. PATIENT-LVL IV: CPT | Mod: PBBFAC,,, | Performed by: INTERNAL MEDICINE

## 2022-09-14 PROCEDURE — 1160F RVW MEDS BY RX/DR IN RCRD: CPT | Mod: CPTII,S$GLB,, | Performed by: INTERNAL MEDICINE

## 2022-09-14 PROCEDURE — 3044F PR MOST RECENT HEMOGLOBIN A1C LEVEL <7.0%: ICD-10-PCS | Mod: CPTII,S$GLB,, | Performed by: INTERNAL MEDICINE

## 2022-09-14 PROCEDURE — 1159F PR MEDICATION LIST DOCUMENTED IN MEDICAL RECORD: ICD-10-PCS | Mod: CPTII,S$GLB,, | Performed by: INTERNAL MEDICINE

## 2022-09-14 PROCEDURE — 3044F HG A1C LEVEL LT 7.0%: CPT | Mod: CPTII,S$GLB,, | Performed by: INTERNAL MEDICINE

## 2022-09-14 PROCEDURE — 99396 PR PREVENTIVE VISIT,EST,40-64: ICD-10-PCS | Mod: S$GLB,,, | Performed by: INTERNAL MEDICINE

## 2022-09-14 PROCEDURE — 1160F PR REVIEW ALL MEDS BY PRESCRIBER/CLIN PHARMACIST DOCUMENTED: ICD-10-PCS | Mod: CPTII,S$GLB,, | Performed by: INTERNAL MEDICINE

## 2022-09-14 PROCEDURE — 99396 PREV VISIT EST AGE 40-64: CPT | Mod: S$GLB,,, | Performed by: INTERNAL MEDICINE

## 2022-09-14 PROCEDURE — 1159F MED LIST DOCD IN RCRD: CPT | Mod: CPTII,S$GLB,, | Performed by: INTERNAL MEDICINE

## 2022-09-14 NOTE — PROGRESS NOTES
Subjective:       Patient ID: Astrid Mane is a 57 y.o. female who  has a past medical history of Anxiety, Asthma, Breast cancer, PMB (postmenopausal bleeding), PONV (postoperative nausea and vomiting), Right thyroid nodule, and Vaginal atrophy.    Chief Complaint: Annual Exam     History was obtained from the patient and supplemented through chart review  There were no ER or clinic visits since our last appointment.  Reviewed OSH thyroid US.    Her son in law is a  PCP with [facility] at the [place] location, [facility]. She is a homemaker.    HPI    She had COVID .  COVID risk score 1, so did not receive Paxlovid.     HLD:  Is currently taking Lipitor 20.   Lab Results   Component Value Date    LDLCALC 78 02/11/2022     The 10-year ASCVD risk score (Barbara RAPP, et al., 2019) is: 1.2%    Values used to calculate the score:      Age: 57 years      Sex: Female      Is Non- : No      Diabetic: No      Tobacco smoker: No      Systolic Blood Pressure: 118 mmHg      Is BP treated: No      HDL Cholesterol: 94 mg/dL      Total Cholesterol: 185 mg/dL    Hypothyroidism, thyroid nodule:    Was initially discovered on breast MRI. OSH thyroid ultrasound  with 2 cm nodule in the lower lobe of the right lobe of the thyroid.  Tirads score 4, which is moderately suspicious.  0.5 cm nodule in the midpole of the right thyroid lobe, T rad score 3, mildly suspicious.  Also with 0.3 cm in the isthmus.    OSH FNA 5/2019 was benign.      OSH thyroid ultrasound at [facility]  with thyroid nodules.  Largest 1.8 cm. Did not meet criteria for FNA.  Recommend monitoring and 1, 3, 5 years.  Repeat thyroid US at [facility] 1/27/2022 with stable nodules.    Not on Synthroid.  No compressive sx.  Follows c Dr. Fairchild at [facility].  Lab Results   Component Value Date    TSH 1.57 02/11/2022    FREET4 0.87 03/22/2018     Obesity:    PA denied for Trulicity.  On Ozempic for weight loss.  Is able to get this from  Dr. Casas in [place], who can order from [facility], which is cheaper.  Is on Phendimetrazine here and there. Following c OSH specialist.    Exercise: walks 3 miles/day 4-5/week, weights 2-3/week.   ETOH: drinks  BMI Readings from Last 3 Encounters:   22 (P) 28.11 kg/m²   22 29.54 kg/m²   22 28.08 kg/m²     Lab Results   Component Value Date/Time    HGBA1C 5.3 2022 07:34 AM    HGBA1C 5.0 10/14/2020 07:50 AM               Not addressed today.  AR:  Causes headache.  Takes Mucinex, claritin daily.    Asthma:  Flares 3/year.  Uses rescue inhaler 4/year. Uses albuterol inhaler mainly during the winter.    Doing well.  Continue rescue inhaler p.r.n..    Snoring:    Snoring all her life.  Difficulty falling back asleep after her  wakes up.  He doesn't like the AC on and she likes it cold d/t hot flashes.  No apnea, daytime fatigue.  Does feel refreshed after adequate sleep.  No HTN, obesity. +overweight.  STOP BAN, low risk.    STOP BANG low risk. Has minimal sx, so she declined PSG/CAITLIN eval.    Situational Anxiety:  On clonidine daily for hot flashes.  Has Xanax PRN when flying.  Takes it 6 times a year.   reviewed.      Hot flashes:  Is on Clonidine 0.1, Effexor.  Follows with OBGYN.    Postmenopausal bleeding, fibroids:  x several years.  Following with OBGYN.  S/p hysteroscopy, D&C on 10/21/2020.  Pathology was benign. Repeat U/S c thickened endometrial stripe.  S/p repeat hysteroscopy.  If recurrent bleeding, would recommend hysterectomy.    GERD:  Has PPI PRN.     Colon polyp:  C scope with [facility]  with sessile polyp.  No biopsy report, but reportedly neg.  Planning to repeat in 5 years, .    History of Breast cancer:  Follows with oncology. S/p lumpectomy.  Completed radiation, tamoxifen.  ANNA. Has mammograms at [facility], alternating with MRI q.6 months.  No acute issues. F/u c Oncology with breast imaging q6 mo.     Review of Systems   Constitutional:  Negative for activity  "change and appetite change.   Respiratory:  Negative for shortness of breath and wheezing.    Cardiovascular:  Negative for chest pain and leg swelling.   Gastrointestinal:  Negative for constipation and diarrhea.   Musculoskeletal:  Negative for gait problem.   Skin:  Negative for rash and wound.   Neurological:  Negative for dizziness and light-headedness.   Hematological:  Negative for adenopathy.   Psychiatric/Behavioral:  Negative for confusion. The patient is not nervous/anxious.        I personally reviewed Past Medical History, Past Surgical History, Social History, and Family History.    Objective:      Vitals:    09/14/22 0926   BP: (P) 118/62   Pulse: (P) 72   SpO2: (P) 97%   Weight: (P) 79 kg (174 lb 2.6 oz)   Height: (P) 5' 6" (1.676 m)        Physical Exam  Constitutional:       General: She is not in acute distress.     Appearance: She is well-developed. She is not diaphoretic.   HENT:      Head: Normocephalic and atraumatic.      Nose: Nose normal.      Mouth/Throat:      Pharynx: No oropharyngeal exudate.   Eyes:      General: No scleral icterus.        Right eye: No discharge.         Left eye: No discharge.   Neck:      Thyroid: No thyromegaly.      Trachea: No tracheal deviation.   Cardiovascular:      Rate and Rhythm: Normal rate and regular rhythm.      Heart sounds: Normal heart sounds. No murmur heard.  Pulmonary:      Effort: Pulmonary effort is normal. No respiratory distress.      Breath sounds: Normal breath sounds. No wheezing.   Abdominal:      General: Bowel sounds are normal. There is no distension.      Palpations: Abdomen is soft.      Tenderness: There is no abdominal tenderness.   Musculoskeletal:         General: No deformity.      Cervical back: Neck supple.      Right lower leg: No edema.      Left lower leg: No edema.   Lymphadenopathy:      Cervical: No cervical adenopathy.   Skin:     General: Skin is warm and dry.      Findings: No erythema.   Neurological:      Mental " Status: She is alert.      Gait: Gait normal.   Psychiatric:         Behavior: Behavior normal.         Lab Results   Component Value Date    WBC 4.3 (L) 02/11/2022    HGB 15.1 02/11/2022    HCT 45.8 02/11/2022     02/11/2022    CHOL 185 02/11/2022    TRIG 82 02/11/2022    HDL 94 (H) 02/11/2022    ALT 42 03/18/2022    AST 31 03/18/2022     05/18/2022    K 4.8 05/18/2022     05/18/2022    CREATININE 0.50 05/18/2022    BUN 12.0 05/18/2022    CO2 26 05/18/2022    TSH 1.57 02/11/2022    HGBA1C 5.3 02/11/2022       The 10-year ASCVD risk score (Barbara RAPP, et al., 2019) is: 1.2%    Values used to calculate the score:      Age: 57 years      Sex: Female      Is Non- : No      Diabetic: No      Tobacco smoker: No      Systolic Blood Pressure: 118 mmHg      Is BP treated: No      HDL Cholesterol: 94 mg/dL      Total Cholesterol: 185 mg/dL    (Imaging have been independently reviewed)  Pelvic ultrasound with thickened endometrial stripe, fibroids.    Assessment:       1. Annual physical exam    2. Mixed hyperlipidemia    3. Right thyroid nodule    4. Obesity (BMI 30.0-34.9)    5. Influenza vaccine needed    6. Need for pneumococcal vaccine    7. Need for tetanus, diphtheria, and acellular pertussis (Tdap) vaccine    8. High priority for COVID-19 vaccination          Plan:       Astrid was seen today for annual exam.    Diagnoses and all orders for this visit:    Annual physical exam  Comments:  Due for cscope next year. She will msg OSH Oncology for MMGs.    Mixed hyperlipidemia  Comments:  FLP controlled.  Cont Lipitor 20. Monitor FLP annually.    Right thyroid nodule  Comments:  Ultrasound with stable nodules. FNA 2019 benign. Follows c OSH Endo at [facility]. Monitor TSH annually.    Obesity (BMI 30.0-34.9)  Comments:  Stable. Doing well with exercise. Cont meds for weight.  F/u c OSH specialist.    Influenza vaccine needed  Comments:  Advised to obtain vaccine at Pharmacy.    Need for  pneumococcal vaccine  Comments:  PCV 20 d/t asthma. Advised to obtain vaccine at Pharmacy.    Need for tetanus, diphtheria, and acellular pertussis (Tdap) vaccine  Comments:  Advised to obtain vaccine at Pharmacy.    High priority for COVID-19 vaccination  Comments:  Qualifies for the COVID booster vaccine. Provided information to schedule COVID vaccine.         Side effects of medication(s) were discussed in detail and patient voiced understanding.  Patient will call back for any issues or complications.     Health Maintenance   Topic Date Due    TETANUS VACCINE  Never done    Mammogram  11/22/2022    Lipid Panel  02/11/2027    Hepatitis C Screening  Completed     RTC in 1 year(s) or sooner PRN for annual.

## 2023-01-13 DIAGNOSIS — J45.20 MILD INTERMITTENT ASTHMA WITHOUT COMPLICATION: Primary | ICD-10-CM

## 2023-01-13 RX ORDER — ALBUTEROL SULFATE 90 UG/1
2 AEROSOL, METERED RESPIRATORY (INHALATION) EVERY 6 HOURS PRN
Qty: 18 G | Refills: 2 | Status: SHIPPED | OUTPATIENT
Start: 2023-01-13 | End: 2024-01-08

## 2023-01-13 NOTE — TELEPHONE ENCOUNTER
No new care gaps identified.  [facility] Embedded Care Gaps. Reference number: [id number]. 1/13/2023   8:19:43 AM CST

## 2023-01-13 NOTE — TELEPHONE ENCOUNTER
----- Message from Gael Cortez sent at 1/13/2023  8:12 AM CST -----  Regarding: Prescription Change  Contact: Bev ([facility])  Type:  Pharmacy Calling to Clarify an RX    Name of Caller: Bev    Pharmacy Name: ([facility])    Prescription Name: albuterol (VENTOLIN HFA) 90 mcg/actuation inhaler    What do they need to clarify?: Pt would like to change to generic Pro ventolin due to price  to go to [id number] - [place] - [address] AT [facility] Call Back Number: [phone]    Additional Information:

## 2023-01-19 DIAGNOSIS — J45.20 MILD INTERMITTENT ASTHMA WITHOUT COMPLICATION: ICD-10-CM

## 2023-01-19 RX ORDER — ALBUTEROL SULFATE 90 UG/1
2 AEROSOL, METERED RESPIRATORY (INHALATION) EVERY 6 HOURS PRN
Qty: 18 G | Refills: 2 | OUTPATIENT
Start: 2023-01-19 | End: 2024-01-19

## 2023-01-19 NOTE — TELEPHONE ENCOUNTER
Refill Encounter    PCP Visits: Recent Visits  Date Type Provider Dept   09/14/22 Office Visit Isidra Abdul MD [facility]   05/04/22 Office Visit Isidra Abdul MD [facility]   02/15/22 Office Visit Isidra Abdul MD [facility]   Showing recent visits within past 360 days and meeting all other requirements  Future Appointments  No visits were found meeting these conditions.  Showing future appointments within next 720 days and meeting all other requirements     Last 3 Blood Pressure:   BP Readings from Last 3 Encounters:   09/14/22 (P) 118/62   03/31/22 119/81   02/23/22 109/65     Preferred Pharmacy:   [id number] - [place] - [address] [place] [address] AT [facility]  [address] [place] [address]  [place] [address]  Phone: [phone] Fax: [phone]    [id number] - [address] AT [facility] & [address]  [facility] [address]  Phone: [phone] Fax: [phone]    [facility][id number] [place], [place] - [address] [facility]  [address] [facility] [address]  Phone: [phone] Fax: [phone]    Requested RX:  Requested Prescriptions     Pending Prescriptions Disp Refills    albuterol (PROVENTIL HFA) 90 mcg/actuation inhaler 18 g 2     Sig: Inhale 2 puffs into the lungs every 6 (six) hours as needed for Wheezing. Rescue      RX Route: Normal

## 2023-01-19 NOTE — TELEPHONE ENCOUNTER
No new care gaps identified.  [facility] Embedded Care Gaps. Reference number: [id number]. 1/19/2023   7:52:44 AM CST

## 2023-01-19 NOTE — TELEPHONE ENCOUNTER
Refill Decision Note   Astrid Mane  is requesting a refill authorization.  Brief Assessment and Rationale for Refill:  Quick Discontinue     Medication Therapy Plan:       Medication Reconciliation Completed: No   Comments:     No Care Gaps recommended.     Note composed:5:08 PM 01/19/2023

## 2023-01-19 NOTE — TELEPHONE ENCOUNTER
----- Message from Gael Cortez sent at 1/18/2023  3:45 PM CST -----  Regarding: Resend Prescription  Contact: Tracie ([facility])  Name of Who is Calling: Tracie ([facility])      What is the request in detail: [id number] - [place] [place] - [address] [place] [address] AT [facility] did not receive the albuterol (PROVENTIL HFA) 90 mcg/actuation inhaler prescription. Requesting for it to be resent.       Can the clinic reply by [facility]: no      What Number to Call Back if not in [facility]:  [phone]

## 2023-01-24 ENCOUNTER — OFFICE VISIT (OUTPATIENT)
Dept: HEMATOLOGY/ONCOLOGY | Facility: CLINIC | Age: 58
End: 2023-01-24
Payer: COMMERCIAL

## 2023-01-24 VITALS
OXYGEN SATURATION: 100 % | RESPIRATION RATE: 18 BRPM | DIASTOLIC BLOOD PRESSURE: 73 MMHG | BODY MASS INDEX: 29.54 KG/M2 | TEMPERATURE: 98 F | HEIGHT: 66 IN | HEART RATE: 83 BPM | SYSTOLIC BLOOD PRESSURE: 137 MMHG

## 2023-01-24 DIAGNOSIS — Z85.3 HISTORY OF BREAST CANCER: Primary | ICD-10-CM

## 2023-01-24 PROCEDURE — 3008F PR BODY MASS INDEX (BMI) DOCUMENTED: ICD-10-PCS | Mod: CPTII,S$GLB,, | Performed by: PHYSICIAN ASSISTANT

## 2023-01-24 PROCEDURE — 1159F MED LIST DOCD IN RCRD: CPT | Mod: CPTII,S$GLB,, | Performed by: PHYSICIAN ASSISTANT

## 2023-01-24 PROCEDURE — 3075F PR MOST RECENT SYSTOLIC BLOOD PRESS GE 130-139MM HG: ICD-10-PCS | Mod: CPTII,S$GLB,, | Performed by: PHYSICIAN ASSISTANT

## 2023-01-24 PROCEDURE — 99213 OFFICE O/P EST LOW 20 MIN: CPT | Mod: S$GLB,,, | Performed by: PHYSICIAN ASSISTANT

## 2023-01-24 PROCEDURE — 99999 PR PBB SHADOW E&M-EST. PATIENT-LVL IV: CPT | Mod: PBBFAC,,, | Performed by: PHYSICIAN ASSISTANT

## 2023-01-24 PROCEDURE — 99213 PR OFFICE/OUTPT VISIT, EST, LEVL III, 20-29 MIN: ICD-10-PCS | Mod: S$GLB,,, | Performed by: PHYSICIAN ASSISTANT

## 2023-01-24 PROCEDURE — 3078F PR MOST RECENT DIASTOLIC BLOOD PRESSURE < 80 MM HG: ICD-10-PCS | Mod: CPTII,S$GLB,, | Performed by: PHYSICIAN ASSISTANT

## 2023-01-24 PROCEDURE — 3008F BODY MASS INDEX DOCD: CPT | Mod: CPTII,S$GLB,, | Performed by: PHYSICIAN ASSISTANT

## 2023-01-24 PROCEDURE — 3075F SYST BP GE 130 - 139MM HG: CPT | Mod: CPTII,S$GLB,, | Performed by: PHYSICIAN ASSISTANT

## 2023-01-24 PROCEDURE — 3078F DIAST BP <80 MM HG: CPT | Mod: CPTII,S$GLB,, | Performed by: PHYSICIAN ASSISTANT

## 2023-01-24 PROCEDURE — 1159F PR MEDICATION LIST DOCUMENTED IN MEDICAL RECORD: ICD-10-PCS | Mod: CPTII,S$GLB,, | Performed by: PHYSICIAN ASSISTANT

## 2023-01-24 PROCEDURE — 99999 PR PBB SHADOW E&M-EST. PATIENT-LVL IV: ICD-10-PCS | Mod: PBBFAC,,, | Performed by: PHYSICIAN ASSISTANT

## 2023-01-24 NOTE — PROGRESS NOTES
Subjective:       Patient ID: Astrid Mane is a 57 y.o. female.    Chief Complaint: Malignant neoplasm of upper-outer quadrant of right breast     Mrs. Mane is a 57-year-old white female seen for  carcinoma of the right breast.  She has been completed 5 years of Tamoxifen therapy in 2020.    Patient feeling well.  No self palpated breast mass or tenderness.  She notes some suprabpubic heaviness once a month for several days, then resolves on it's own. She is seeing GYN tomorrow for annual visit. No vaginal bleeding.   No fever, chills, nausea, vomiting or shortness of breath. Normal bowel and urinary function.  Exercising regularly.     Mammogram and US from [facility] on 1/23/2023 (see care everywhere) were unremarkable. She continues alternating mammograms and MRIs every 6 months and would like to transfer her imaging to [facility].      Breast history:  Mammograms on February 23, 2015 showed an asymmetric density in the right breast which had become more conspicuous.  A biopsy was recommended.   On March 2 a needle biopsy showed infiltrating ductal carcinoma grade 2 with some intermediate grade DCIS.  The tumor was % ER positive, % VT positive, HER-2 was 2+ with negative FISH.  An MRI on March 6 showed a 1.1 cm lesion in the posterior right breast.  On March 23, A left breast biopsy for an abnormality noted at the 1:00 position showed focal adenosis and focal ductal hyperplasia without atypia.     On March 26 she underwent lumpectomy and sentinel lymph node biopsy which was performed by Dr. Ishmael Aguilera at [facility].  The pathology from that surgery revealed a  low grade infiltrating ductal carcinoma, histologic grade 2, nuclear grade 2, mitotic index 1, which measured 6 mm.  There was some associated DCIS. Margins were negative.  0/2 axillary lymph nodes were involved with tumor.      Final pathological stage TIb N0, stage IA.  An Oncotype score returned at 18 (low risk).      She completed radiation therapy on June 4, 2015 and started tamoxifen several weeks later.    Review of Systems   Constitutional: Negative.    HENT:  Negative for nasal congestion, rhinorrhea, sore throat and trouble swallowing.    Eyes:  Negative for visual disturbance.   Respiratory:  Negative for cough, chest tightness and shortness of breath.    Cardiovascular:  Negative for chest pain, palpitations and leg swelling.   Gastrointestinal:  Negative for abdominal pain, blood in stool, constipation, diarrhea, nausea and vomiting.   Genitourinary:  Negative for dysuria, hematuria and vaginal bleeding.   Musculoskeletal:  Negative for arthralgias, back pain and myalgias.   Integumentary:  Negative for pallor and rash.   Neurological:  Negative for dizziness, weakness and headaches.   Hematological:  Negative for adenopathy. Does not bruise/bleed easily.   Psychiatric/Behavioral:  Negative for dysphoric mood and suicidal ideas. The patient is not nervous/anxious.        Objective:      Physical Exam  Vitals reviewed.   Constitutional:       General: She is not in acute distress.     Appearance: She is well-developed.      Comments: Presents alone   HENT:      Head: Normocephalic.      Mouth/Throat:      Pharynx: No oropharyngeal exudate.   Eyes:      General: No scleral icterus.     Conjunctiva/sclera: Conjunctivae normal.      Pupils: Pupils are equal, round, and reactive to light.   Neck:      Thyroid: No thyromegaly.   Cardiovascular:      Rate and Rhythm: Normal rate and regular rhythm.   Pulmonary:      Effort: Pulmonary effort is normal. No respiratory distress.      Breath sounds: Normal breath sounds.      Comments: Right breast with well healed lumpectomy incision with some underlying fibrosis. No mass, nodule or skin changes. Left breast with some scar tissue just below the incision line but no mass, nodule or skin changes.  There is no reproducible tenderness to palpation. No axillary or supraclavicular  adenopathy.   Lungs clear to ausculation     Abdominal:      General: Bowel sounds are normal. There is no distension.      Palpations: Abdomen is soft. There is no mass.      Tenderness: There is no abdominal tenderness.   Musculoskeletal:         General: No tenderness. Normal range of motion.      Cervical back: Normal range of motion and neck supple.      Comments: No spinal or paraspinal tenderness to palpation     Lymphadenopathy:      Cervical: No cervical adenopathy.   Skin:     General: Skin is warm and dry.   Neurological:      Mental Status: She is alert and oriented to person, place, and time.      Cranial Nerves: No cranial nerve deficit.   Psychiatric:         Behavior: Behavior normal.         Thought Content: Thought content normal.       Assessment:       Problem List Items Addressed This Visit          Oncology    History of breast cancer - Primary    Relevant Orders    MRI Breast w/wo Contrast, w/CAD, Bilateral    Mammo Digital Screening Bilat       Plan:         Patient clinically doing well without evidence of disease. She will continue to alternate MRI and Mammogram every 6 months. MRI due July 2022 and mammogram again next January of 2024.  All questions answered to satisfaction of patient.

## 2023-01-25 ENCOUNTER — OFFICE VISIT (OUTPATIENT)
Dept: OBSTETRICS AND GYNECOLOGY | Facility: CLINIC | Age: 58
End: 2023-01-25
Payer: COMMERCIAL

## 2023-01-25 VITALS — SYSTOLIC BLOOD PRESSURE: 114 MMHG | HEIGHT: 66 IN | BODY MASS INDEX: 29.54 KG/M2 | DIASTOLIC BLOOD PRESSURE: 75 MMHG

## 2023-01-25 DIAGNOSIS — R10.2 PELVIC PRESSURE IN FEMALE: ICD-10-CM

## 2023-01-25 DIAGNOSIS — Z13.820 SCREENING FOR OSTEOPOROSIS: Primary | ICD-10-CM

## 2023-01-25 DIAGNOSIS — Z85.3 HISTORY OF BREAST CANCER: ICD-10-CM

## 2023-01-25 DIAGNOSIS — N95.2 ATROPHIC VAGINITIS: ICD-10-CM

## 2023-01-25 DIAGNOSIS — Z01.419 ROUTINE GYNECOLOGICAL EXAMINATION: ICD-10-CM

## 2023-01-25 LAB
BILIRUB UR QL STRIP: NEGATIVE
CAOX CRY UR QL COMP ASSIST: NORMAL
CLARITY UR REFRACT.AUTO: CLEAR
COLOR UR AUTO: YELLOW
GLUCOSE UR QL STRIP: NEGATIVE
HGB UR QL STRIP: NEGATIVE
KETONES UR QL STRIP: ABNORMAL
LEUKOCYTE ESTERASE UR QL STRIP: NEGATIVE
MICROSCOPIC COMMENT: NORMAL
NITRITE UR QL STRIP: NEGATIVE
PH UR STRIP: 5 [PH] (ref 5–8)
PROT UR QL STRIP: NEGATIVE
RBC #/AREA URNS AUTO: 0 /HPF (ref 0–4)
SP GR UR STRIP: 1.01 (ref 1–1.03)
SQUAMOUS #/AREA URNS AUTO: 1 /HPF
URN SPEC COLLECT METH UR: ABNORMAL
WBC #/AREA URNS AUTO: 0 /HPF (ref 0–5)

## 2023-01-25 PROCEDURE — 3008F PR BODY MASS INDEX (BMI) DOCUMENTED: ICD-10-PCS | Mod: CPTII,S$GLB,, | Performed by: PHYSICIAN ASSISTANT

## 2023-01-25 PROCEDURE — 1159F MED LIST DOCD IN RCRD: CPT | Mod: CPTII,S$GLB,, | Performed by: PHYSICIAN ASSISTANT

## 2023-01-25 PROCEDURE — 99396 PREV VISIT EST AGE 40-64: CPT | Mod: S$GLB,,, | Performed by: PHYSICIAN ASSISTANT

## 2023-01-25 PROCEDURE — 81001 URINALYSIS AUTO W/SCOPE: CPT | Performed by: PHYSICIAN ASSISTANT

## 2023-01-25 PROCEDURE — 1160F RVW MEDS BY RX/DR IN RCRD: CPT | Mod: CPTII,S$GLB,, | Performed by: PHYSICIAN ASSISTANT

## 2023-01-25 PROCEDURE — 3078F PR MOST RECENT DIASTOLIC BLOOD PRESSURE < 80 MM HG: ICD-10-PCS | Mod: CPTII,S$GLB,, | Performed by: PHYSICIAN ASSISTANT

## 2023-01-25 PROCEDURE — 99999 PR PBB SHADOW E&M-EST. PATIENT-LVL IV: ICD-10-PCS | Mod: PBBFAC,,, | Performed by: PHYSICIAN ASSISTANT

## 2023-01-25 PROCEDURE — 99999 PR PBB SHADOW E&M-EST. PATIENT-LVL IV: CPT | Mod: PBBFAC,,, | Performed by: PHYSICIAN ASSISTANT

## 2023-01-25 PROCEDURE — 3074F PR MOST RECENT SYSTOLIC BLOOD PRESSURE < 130 MM HG: ICD-10-PCS | Mod: CPTII,S$GLB,, | Performed by: PHYSICIAN ASSISTANT

## 2023-01-25 PROCEDURE — 3074F SYST BP LT 130 MM HG: CPT | Mod: CPTII,S$GLB,, | Performed by: PHYSICIAN ASSISTANT

## 2023-01-25 PROCEDURE — 3008F BODY MASS INDEX DOCD: CPT | Mod: CPTII,S$GLB,, | Performed by: PHYSICIAN ASSISTANT

## 2023-01-25 PROCEDURE — 1160F PR REVIEW ALL MEDS BY PRESCRIBER/CLIN PHARMACIST DOCUMENTED: ICD-10-PCS | Mod: CPTII,S$GLB,, | Performed by: PHYSICIAN ASSISTANT

## 2023-01-25 PROCEDURE — 3078F DIAST BP <80 MM HG: CPT | Mod: CPTII,S$GLB,, | Performed by: PHYSICIAN ASSISTANT

## 2023-01-25 PROCEDURE — 87086 URINE CULTURE/COLONY COUNT: CPT | Performed by: PHYSICIAN ASSISTANT

## 2023-01-25 PROCEDURE — 1159F PR MEDICATION LIST DOCUMENTED IN MEDICAL RECORD: ICD-10-PCS | Mod: CPTII,S$GLB,, | Performed by: PHYSICIAN ASSISTANT

## 2023-01-25 PROCEDURE — 99396 PR PREVENTIVE VISIT,EST,40-64: ICD-10-PCS | Mod: S$GLB,,, | Performed by: PHYSICIAN ASSISTANT

## 2023-01-25 RX ORDER — PRASTERONE 6.5 MG/1
INSERT VAGINAL
Qty: 28 EACH | Refills: 11 | Status: SHIPPED | OUTPATIENT
Start: 2023-01-25 | End: 2024-03-05 | Stop reason: SDUPTHER

## 2023-01-25 NOTE — PROGRESS NOTES
CC: Well woman exam    Astrid Mane is a 57 y.o. female  presents for well woman exam.  LMP: Patient's last menstrual period was 2015. D&C/hystercoscopy on 2022 with Dr. Ferrer. Denies vaginal bleeding since. Reports lower abdominal/pelvic pressure that comes and goes for about 1 year. Lasts for about 1-2 days with fatigue and then resolves on it's own.  Denies urinary symptoms. Bowels are regular but she is on Ozempic. Has Intrarosa but has not been using regularly.  History of breast cancer completed 5 years of tamoxifen therapy in .     Mammogram: 2023 at [facility], negative  Pap: 10/19/2021 negative, HPV negative.  PCP: Isidra Abdul MD  Routine Screening Labs: 2022  Colonoscopy: 2018  DEXA: Never    Past Medical History:   Diagnosis Date    Anxiety     Asthma     Breast cancer     right breast estrogen receptor positive    PMB (postmenopausal bleeding)     PONV (postoperative nausea and vomiting)     Right thyroid nodule     Vaginal atrophy      Past Surgical History:   Procedure Laterality Date    BREAST LUMPECTOMY      right breast     SECTION      total 3    DILATION AND CURETTAGE OF UTERUS      HYSTEROSCOPY WITH DILATION AND CURETTAGE OF UTERUS N/A 10/21/2020    Procedure: HYSTEROSCOPY, WITH DILATION AND CURETTAGE OF UTERUS;  Surgeon: Monet Ferrer MD;  Location: [facility];  Service: OB/GYN;  Laterality: N/A;    HYSTEROSCOPY WITH DILATION AND CURETTAGE OF UTERUS N/A 2022    Procedure: HYSTEROSCOPY, WITH DILATION AND CURETTAGE OF UTERUS;  Surgeon: Monet Ferrer MD;  Location: [facility];  Service: OB/GYN;  Laterality: N/A;    TUBAL LIGATION       Social History     Socioeconomic History    Marital status:    Tobacco Use    Smoking status: Former     Packs/day: 0.00     Years: 0.00     Pack years: 0.00     Types: Cigarettes     Quit date: 1990     Years since quittin.0    Smokeless tobacco: Former    Tobacco comments:      Have not smoked in 30 years   Substance and Sexual Activity    Alcohol use: Yes     Alcohol/week: 6.0 standard drinks     Types: 6 Glasses of wine per week     Comment: socially    Drug use: No    Sexual activity: Yes     Partners: Male     Birth control/protection: None     Social Determinants of Health     Financial Resource Strain: Low Risk     Difficulty of Paying Living Expenses: Not hard at all   Food Insecurity: No Food Insecurity    Worried About Running Out of Food in the Last Year: Never true    Ran Out of Food in the Last Year: Never true   Transportation Needs: No Transportation Needs    Lack of Transportation (Medical): No    Lack of Transportation (Non-Medical): No   Physical Activity: Sufficiently Active    Days of Exercise per Week: 6 days    Minutes of Exercise per Session: 50 min   Stress: No Stress Concern Present    Feeling of Stress : Not at all   Social Connections: Unknown    Frequency of Communication with Friends and Family: More than three times a week    Frequency of Social Gatherings with Friends and Family: More than three times a week    Active Member of Clubs or Organizations: No    Attends Club or Organization Meetings: Never    Marital Status:    Housing Stability: Low Risk     Unable to Pay for Housing in the Last Year: No    Number of Places Lived in the Last Year: 1    Unstable Housing in the Last Year: No     Family History   Problem Relation Age of Onset    Asthma Mother     Heart disease Mother         78 2 stints    Coronary artery disease Father         heavy tobacco    Melanoma Maternal Grandmother 70    Ovarian cancer Neg Hx     Breast cancer Neg Hx     Cancer Neg Hx     Colon cancer Neg Hx      OB History          3    Para   3    Term   3            AB        Living             SAB        IAB        Ectopic        Multiple        Live Births                     Current Outpatient Medications:     albuterol (PROVENTIL HFA) 90 mcg/actuation inhaler,  "Inhale 2 puffs into the lungs every 6 (six) hours as needed for Wheezing. Rescue, Disp: 18 g, Rfl: 2    albuterol (VENTOLIN HFA) 90 mcg/actuation inhaler, INHALE 2 PUFFS UNTO THE LUNGS EVERY 6 HOURS AS NEEDED FOR WHEEZING, Disp: 54 g, Rfl: 2    ALPRAZolam (XANAX) 0.5 MG tablet, TAKE 1 TABLET BY MOUTH TWICE DAILY AS NEEDED FOR FLYING ONLY, Disp: 10 tablet, Rfl: 0    atorvastatin (LIPITOR) 20 MG tablet, TAKE 1 TABLET BY MOUTH EVERY DAY, Disp: 90 tablet, Rfl: 3    cloNIDine (CATAPRES) 0.1 MG tablet, TAKE 1 TABLET BY MOUTH EVERY DAY, Disp: 90 tablet, Rfl: 3    loratadine (CLARITIN) 10 mg tablet, Take 10 mg by mouth once daily., Disp: , Rfl:     omeprazole (PRILOSEC) 40 MG capsule, Take 40 mg by mouth once daily., Disp: , Rfl:     ondansetron (ZOFRAN-ODT) 4 MG TbDL, 4 mg every 6 (six) hours as needed., Disp: , Rfl:     pantoprazole (PROTONIX) 40 MG tablet, Take 1 tablet (40 mg total) by mouth daily as needed (acid reflex). Take every morning on an empty stomach for 2 weeks, then daily as needed, Disp: 90 tablet, Rfl: 3    phendimetrazine tartrate 35 mg Tab, Take 1 tablet by mouth 2 (two) times daily before meals., Disp: , Rfl:     prasterone, dhea, (INTRAROSA) 6.5 mg Inst, INSERT 1 IN THE VAGINA EVERY NIGHT AT BEDTIME, Disp: 28 each, Rfl: 11    semaglutide (OZEMPIC) 0.25 mg or 0.5 mg(2 mg/1.5 mL) pen injector, , Disp: , Rfl:     SERNIVO 0.05 % SprP, Apply topically., Disp: , Rfl:     venlafaxine (EFFEXOR-XR) 75 MG 24 hr capsule, TAKE 1 CAPSULE(75 MG) BY MOUTH EVERY DAY, Disp: 30 capsule, Rfl: 11    The 10-year ASCVD risk score (Barbara RAPP, et al., 2019) is: 1.3%    Values used to calculate the score:      Age: 57 years      Sex: Female      Is Non- : No      Diabetic: No      Tobacco smoker: No      Systolic Blood Pressure: 114 mmHg      Is BP treated: No      HDL Cholesterol: 67 mg/dL      Total Cholesterol: 155 mg/dL    /75   Ht 5' 6" (1.676 m)   LMP 07/01/2015   BMI (P) 28.11 kg/m² "       ROS:  GENERAL: Denies weight gain or weight loss. Feeling well overall.   SKIN: Denies rash or lesions.   HEAD: Denies head injury or headache.   NODES: Denies enlarged lymph nodes.   CHEST: Denies chest pain or shortness of breath.   CARDIOVASCULAR: Denies palpitations or left sided chest pain.   ABDOMEN: No constipation, diarrhea, nausea, vomiting or rectal bleeding. +lower abdominal/pelvic pressure  URINARY: No frequency, dysuria, hematuria, or burning on urination.  REPRODUCTIVE: See HPI.   BREASTS: Denies pain, lumps, or nipple discharge.   HEMATOLOGIC: No easy bruisability or excessive bleeding.   MUSCULOSKELETAL: Denies joint pain or swelling.   NEUROLOGIC: Denies syncope or weakness.   PSYCHIATRIC: Denies depression, anxiety or mood swings.    PHYSICAL EXAM:  APPEARANCE: Well nourished, well developed, in no acute distress.  AFFECT: WNL, alert and oriented x 3  CHEST: Good respiratory effect  ABDOMEN: Soft.  No tenderness or masses.  No hepatosplenomegaly.  No hernias.  BREASTS: No skin changes or visible lesions.  No palpable masses, nipple discharge bilaterally. S/p right breast lumpectomy  PELVIC: Normal external genitalia without lesions. +atrophy Normal hair distribution.  Adequate perineal body, normal urethral meatus.  Vagina moist and well rugated without lesions or discharge.  Cervix pink, without lesions, discharge or tenderness.  No significant cystocele or rectocele.  Bimanual exam shows uterus to be normal size, regular, mobile and nontender.  Adnexa without masses or tenderness.    EXTREMITIES: No edema.    ASSESSMENT:   Screening for osteoporosis  -     DXA Bone Density Spine And Hip; Future; Expected date: 01/25/2023    Pelvic pressure in female  -     US Pelvis Comp with Transvag NON-OB (xpd); Future; Expected date: 01/25/2023  -     Urine culture  -     Urinalysis    Atrophic vaginitis  -     prasterone, dhea, (INTRAROSA) 6.5 mg Inst; INSERT 1 IN THE VAGINA EVERY NIGHT AT BEDTIME   Dispense: 28 each; Refill: 11    Routine gynecological examination    History of breast cancer          PLAN:   Schedule DEXA  Reviewed vitamin D and Calcium.  Urine culture/UA  Schedule pelvic US.  Restart intrarosa nightly.  If work up is negative and symptoms do not improve, needs to follow up with PCP of GI.  Follow up annually for WWE or sooner PRN.    Patient was counseled today on A.C.S. Pap guidelines and recommendations for yearly pelvic exams, mammograms and monthly self breast exams; to see her PCP for other health maintenance.

## 2023-01-27 LAB — BACTERIA UR CULT: NO GROWTH

## 2023-04-21 ENCOUNTER — PATIENT OUTREACH (OUTPATIENT)
Dept: ADMINISTRATIVE | Facility: HOSPITAL | Age: 58
End: 2023-04-21
Payer: COMMERCIAL

## 2023-05-01 ENCOUNTER — PATIENT MESSAGE (OUTPATIENT)
Dept: OBSTETRICS AND GYNECOLOGY | Facility: CLINIC | Age: 58
End: 2023-05-01
Payer: COMMERCIAL

## 2023-05-01 RX ORDER — FLUCONAZOLE 150 MG/1
150 TABLET ORAL DAILY
Qty: 1 TABLET | Refills: 0 | Status: SHIPPED | OUTPATIENT
Start: 2023-05-01 | End: 2023-05-02

## 2023-06-15 DIAGNOSIS — K21.9 GASTROESOPHAGEAL REFLUX DISEASE, UNSPECIFIED WHETHER ESOPHAGITIS PRESENT: ICD-10-CM

## 2023-06-15 NOTE — TELEPHONE ENCOUNTER
No care due was identified.  Health [facility] Embedded Care Due Messages. Reference number: [id number].   6/15/2023 5:35:03 AM CDT

## 2023-06-15 NOTE — TELEPHONE ENCOUNTER
Refill Routing Note   Medication(s) are not appropriate for processing by [facility] for the following reason(s):      Non-participating provider    [facility] action(s):  Route None identified            Appointments  past 12m or future 3m with PCP    Date Provider   Last Visit   9/14/2022 Isidra Abdul MD   Next Visit   Visit date not found Isidra Abdul MD   ED visits in past 90 days: 0        Note composed:8:47 AM 06/15/2023

## 2023-06-16 RX ORDER — PANTOPRAZOLE SODIUM 40 MG/1
TABLET, DELAYED RELEASE ORAL
Qty: 90 TABLET | Refills: 0 | Status: SHIPPED | OUTPATIENT
Start: 2023-06-16 | End: 2023-11-29

## 2023-06-16 NOTE — TELEPHONE ENCOUNTER
Refill Encounter    PCP Visits: Recent Visits  Date Type Provider Dept   09/14/22 Office Visit Isidra Abdul MD [facility] Internal Medicine   Showing recent visits within past 360 days and meeting all other requirements  Future Appointments  No visits were found meeting these conditions.  Showing future appointments within next 720 days and meeting all other requirements     Last 3 Blood Pressure:   BP Readings from Last 3 Encounters:   01/25/23 114/75   01/24/23 137/73   09/14/22 (P) 118/62     Preferred Pharmacy:   [facility] #[id number] - [place], [place] - [address] AT [facility]  [address]  [facility] [address]  Phone: [phone] Fax: [phone]    [facility] #[id number] - [address] AT [address]  [facility] [address]  Phone: [phone] Fax: [phone]    [facility] #[id number] [place], [place] - [address] [facility] [address]  [address] [facility] [address]  Phone: [phone] Fax: [phone]    Requested RX:  Requested Prescriptions     Pending Prescriptions Disp Refills    pantoprazole (PROTONIX) 40 MG tablet [Pharmacy Med Name: PANTOPRAZOLE 40MG TABLETS] 90 tablet 3     Sig: TAKE 1 TABLET BY MOUTH EVERY MORNING ON AN EMPTY STOMACH FOR 2 WEEKS THEN DAILY AS NEEDED      RX Route: Normal

## 2023-06-20 DIAGNOSIS — E78.2 MIXED HYPERLIPIDEMIA: ICD-10-CM

## 2023-06-20 NOTE — TELEPHONE ENCOUNTER
No care due was identified.  [facility] Embedded Care Due Messages. Reference number: [id number].   6/20/2023 4:52:36 PM CDT

## 2023-06-21 RX ORDER — ATORVASTATIN CALCIUM 20 MG/1
TABLET, FILM COATED ORAL
Qty: 90 TABLET | Refills: 0 | Status: SHIPPED | OUTPATIENT
Start: 2023-06-21 | End: 2023-08-02

## 2023-06-21 NOTE — TELEPHONE ENCOUNTER
Refill Routing Note   Medication(s) are not appropriate for processing by [facility] for the following reason(s):      Non-participating provider    [facility] action(s):  Route Care Due:  None identified          Appointments  past 12m or future 3m with PCP    Date Provider   Last Visit   9/14/2022 Isidra Abdul MD   Next Visit   Visit date not found Isidra Abdul MD   ED visits in past 90 days: 0        Note composed:9:22 PM 06/20/2023

## 2023-07-19 DIAGNOSIS — F41.9 ANXIETY: ICD-10-CM

## 2023-07-19 NOTE — TELEPHONE ENCOUNTER
Refill Encounter    PCP Visits: Recent Visits  Date Type Provider Dept   09/14/22 Office Visit Isidra Adbul MD [facility] Internal Medicine   Showing recent visits within past 360 days and meeting all other requirements  Future Appointments  No visits were found meeting these conditions.  Showing future appointments within next 720 days and meeting all other requirements     Last 3 Blood Pressure:   BP Readings from Last 3 Encounters:   01/25/23 114/75   01/24/23 137/73   09/14/22 (P) 118/62     Preferred Pharmacy:   [id number] [place] - [address] AT [facility]  [address]  [facility] [address]  Phone: [phone] Fax: [phone]    [facility] #[id number] - [address] AT [address]  [facility] [address]  Phone: [phone] Fax: [phone]    [facility] #[id number] [place], [place] - [address]  [address] [facility] [address]  Phone: [phone] Fax: [phone]    Requested RX:  Requested Prescriptions     Pending Prescriptions Disp Refills    ALPRAZolam (XANAX) 0.5 MG tablet 10 tablet 0     Sig: TAKE 1 TABLET BY MOUTH TWICE DAILY AS NEEDED FOR FLYING ONLY      RX Route: Normal

## 2023-07-20 RX ORDER — ALPRAZOLAM 0.5 MG/1
TABLET ORAL
Qty: 10 TABLET | Refills: 0 | Status: SHIPPED | OUTPATIENT
Start: 2023-07-20

## 2023-07-21 ENCOUNTER — PATIENT MESSAGE (OUTPATIENT)
Dept: INTERNAL MEDICINE | Facility: CLINIC | Age: 58
End: 2023-07-21
Payer: COMMERCIAL

## 2023-07-21 DIAGNOSIS — E78.2 MIXED HYPERLIPIDEMIA: ICD-10-CM

## 2023-08-02 DIAGNOSIS — E78.2 MIXED HYPERLIPIDEMIA: ICD-10-CM

## 2023-08-02 RX ORDER — ATORVASTATIN CALCIUM 20 MG/1
20 TABLET, FILM COATED ORAL
Qty: 90 TABLET | Refills: 0 | Status: SHIPPED | OUTPATIENT
Start: 2023-08-02

## 2023-09-10 DIAGNOSIS — K21.9 GASTROESOPHAGEAL REFLUX DISEASE, UNSPECIFIED WHETHER ESOPHAGITIS PRESENT: ICD-10-CM

## 2023-09-11 RX ORDER — PANTOPRAZOLE SODIUM 40 MG/1
TABLET, DELAYED RELEASE ORAL
Qty: 90 TABLET | Refills: 0 | OUTPATIENT
Start: 2023-09-11

## 2023-09-11 NOTE — TELEPHONE ENCOUNTER
Refill Decision Note   Astrid Mane  is requesting a refill authorization.  Brief Assessment and Rationale for Refill:  Quick Discontinue     Medication Therapy Plan:  patient on omeprazole 40 mg      Comments:     Note composed:4:33 AM 09/11/2023             Appointments     Last Visit   Visit date not found Maury Garay MD   Next Visit   Visit date not found Maury Garay MD

## 2023-12-17 DIAGNOSIS — F41.9 ANXIETY: ICD-10-CM

## 2023-12-18 RX ORDER — ALPRAZOLAM 0.5 MG/1
TABLET ORAL
Qty: 10 TABLET | Refills: 0 | OUTPATIENT
Start: 2023-12-18

## 2023-12-18 NOTE — TELEPHONE ENCOUNTER
No care due was identified.  [facility] Embedded Care Due Messages. Reference number: [id number].   12/18/2023 3:24:08 PM CST

## 2023-12-18 NOTE — TELEPHONE ENCOUNTER
No care due was identified.  Health [facility] Embedded Care Due Messages. Reference number: [id number].   12/17/2023 10:39:11 AM CST

## 2024-01-08 DIAGNOSIS — J45.20 MILD INTERMITTENT ASTHMA WITHOUT COMPLICATION: ICD-10-CM

## 2024-01-08 RX ORDER — ALBUTEROL SULFATE 90 UG/1
2 AEROSOL, METERED RESPIRATORY (INHALATION) EVERY 6 HOURS PRN
Qty: 18 G | Refills: 0 | Status: SHIPPED | OUTPATIENT
Start: 2024-01-08

## 2024-01-30 ENCOUNTER — OFFICE VISIT (OUTPATIENT)
Dept: HEMATOLOGY/ONCOLOGY | Facility: CLINIC | Age: 59
End: 2024-01-30
Payer: COMMERCIAL

## 2024-01-30 VITALS
BODY MASS INDEX: 27.99 KG/M2 | HEIGHT: 66 IN | TEMPERATURE: 98 F | DIASTOLIC BLOOD PRESSURE: 66 MMHG | WEIGHT: 174.19 LBS | RESPIRATION RATE: 18 BRPM | OXYGEN SATURATION: 100 % | HEART RATE: 86 BPM | SYSTOLIC BLOOD PRESSURE: 113 MMHG

## 2024-01-30 DIAGNOSIS — Z85.3 HISTORY OF BREAST CANCER: Primary | ICD-10-CM

## 2024-01-30 PROCEDURE — 3008F BODY MASS INDEX DOCD: CPT | Mod: CPTII,S$GLB,, | Performed by: PHYSICIAN ASSISTANT

## 2024-01-30 PROCEDURE — 1159F MED LIST DOCD IN RCRD: CPT | Mod: CPTII,S$GLB,, | Performed by: PHYSICIAN ASSISTANT

## 2024-01-30 PROCEDURE — 3078F DIAST BP <80 MM HG: CPT | Mod: CPTII,S$GLB,, | Performed by: PHYSICIAN ASSISTANT

## 2024-01-30 PROCEDURE — 99999 PR PBB SHADOW E&M-EST. PATIENT-LVL IV: CPT | Mod: PBBFAC,,, | Performed by: PHYSICIAN ASSISTANT

## 2024-01-30 PROCEDURE — 3074F SYST BP LT 130 MM HG: CPT | Mod: CPTII,S$GLB,, | Performed by: PHYSICIAN ASSISTANT

## 2024-01-30 PROCEDURE — 99213 OFFICE O/P EST LOW 20 MIN: CPT | Mod: S$GLB,,, | Performed by: PHYSICIAN ASSISTANT

## 2024-01-30 NOTE — PROGRESS NOTES
Subjective     Patient ID: Astrid Mane is a 58 y.o. female.    Chief Complaint: Malignant neoplasm of upper-outer quadrant of right breast     Mrs. Mane is a 58-year-old white female seen for  carcinoma of the right breast.  She has been completed 5 years of Tamoxifen therapy in 2020.    Patient feeling well.  No self palpated breast mass or tenderness.    No fever, chills, nausea, vomiting or shortness of breath. Normal bowel and urinary function.  Exercising regularly. 5 X week.  Notes some fatigue.     [place] 1/16/2024 (see care everywhere) were unremarkable. She continues alternating mammograms and MRIs every 6 months and would like to transfer her imaging to [facility]. MRI from 7/26/2023 was unremarkable.      Breast history:  Mammograms on February 23, 2015 showed an asymmetric density in the right breast which had become more conspicuous.  A biopsy was recommended.   On March 2 a needle biopsy showed infiltrating ductal carcinoma grade 2 with some intermediate grade DCIS.  The tumor was % ER positive, % GA positive, HER-2 was 2+ with negative FISH.  An MRI on March 6 showed a 1.1 cm lesion in the posterior right breast.  On March 23, A left breast biopsy for an abnormality noted at the 1:00 position showed focal adenosis and focal ductal hyperplasia without atypia.     On March 26 she underwent lumpectomy and sentinel lymph node biopsy which was performed by Dr. Ishmael Aguilera at [facility].  The pathology from that surgery revealed a  low grade infiltrating ductal carcinoma, histologic grade 2, nuclear grade 2, mitotic index 1, which measured 6 mm.  There was some associated DCIS. Margins were negative.  0/2 axillary lymph nodes were involved with tumor.      Final pathological stage TIb N0, stage IA.  An Oncotype score returned at 18 (low risk).     She completed radiation therapy on June 4, 2015 and started tamoxifen several weeks  later.      Review of Systems   Constitutional: Negative.    HENT:  Negative for nasal congestion, rhinorrhea, sore throat and trouble swallowing.    Eyes:  Negative for visual disturbance.   Respiratory:  Negative for cough, chest tightness and shortness of breath.    Cardiovascular:  Negative for chest pain, palpitations and leg swelling.   Gastrointestinal:  Negative for abdominal pain, blood in stool, constipation, diarrhea, nausea and vomiting.   Genitourinary:  Negative for dysuria, hematuria and vaginal bleeding.   Musculoskeletal:  Negative for arthralgias, back pain and myalgias.   Integumentary:  Negative for pallor and rash.   Neurological:  Negative for dizziness, weakness and headaches.   Hematological:  Negative for adenopathy. Does not bruise/bleed easily.   Psychiatric/Behavioral:  Negative for dysphoric mood and suicidal ideas. The patient is not nervous/anxious.           Objective     Physical Exam  Vitals reviewed.   Constitutional:       General: She is not in acute distress.     Appearance: She is well-developed.      Comments: Presents alone   HENT:      Head: Normocephalic.      Mouth/Throat:      Pharynx: No oropharyngeal exudate.   Eyes:      General: No scleral icterus.     Conjunctiva/sclera: Conjunctivae normal.      Pupils: Pupils are equal, round, and reactive to light.   Neck:      Thyroid: No thyromegaly.   Cardiovascular:      Rate and Rhythm: Normal rate and regular rhythm.   Pulmonary:      Effort: Pulmonary effort is normal. No respiratory distress.      Breath sounds: Normal breath sounds.      Comments: Right breast with well healed lumpectomy incision with some underlying fibrosis. No mass, nodule or skin changes. Left breast with some scar tissue just below the incision line but no mass, nodule or skin changes.   No axillary or supraclavicular adenopathy.   Lungs clear to ausculation     Abdominal:      General: Bowel sounds are normal. There is no distension.       Palpations: Abdomen is soft. There is no mass.      Tenderness: There is no abdominal tenderness.   Musculoskeletal:         General: No tenderness. Normal range of motion.      Cervical back: Normal range of motion and neck supple.      Comments: No spinal or paraspinal tenderness to palpation     Lymphadenopathy:      Cervical: No cervical adenopathy.   Skin:     General: Skin is warm and dry.   Neurological:      Mental Status: She is alert and oriented to person, place, and time.      Cranial Nerves: No cranial nerve deficit.   Psychiatric:         Behavior: Behavior normal.         Thought Content: Thought content normal.            Assessment and Plan     1. History of breast cancer      Patient is clinically without evidence of disease. She will continue alternating mammograms/MRI every 6 months (performed at [facility]) and will return in one year for clinical surveillance. She knows to call in the interim if any issues.         No follow-ups on file.

## 2024-03-05 ENCOUNTER — OFFICE VISIT (OUTPATIENT)
Dept: OBSTETRICS AND GYNECOLOGY | Facility: CLINIC | Age: 59
End: 2024-03-05
Payer: COMMERCIAL

## 2024-03-05 VITALS
WEIGHT: 167 LBS | HEART RATE: 73 BPM | DIASTOLIC BLOOD PRESSURE: 77 MMHG | HEIGHT: 66 IN | BODY MASS INDEX: 26.84 KG/M2 | SYSTOLIC BLOOD PRESSURE: 110 MMHG

## 2024-03-05 DIAGNOSIS — Z85.3 HISTORY OF BREAST CANCER: ICD-10-CM

## 2024-03-05 DIAGNOSIS — R21 VULVAR RASH: ICD-10-CM

## 2024-03-05 DIAGNOSIS — N95.2 ATROPHIC VAGINITIS: ICD-10-CM

## 2024-03-05 DIAGNOSIS — Z01.419 ENCOUNTER FOR GYNECOLOGICAL EXAMINATION WITHOUT ABNORMAL FINDING: Primary | ICD-10-CM

## 2024-03-05 PROCEDURE — 3074F SYST BP LT 130 MM HG: CPT | Mod: CPTII,S$GLB,, | Performed by: PHYSICIAN ASSISTANT

## 2024-03-05 PROCEDURE — 1160F RVW MEDS BY RX/DR IN RCRD: CPT | Mod: CPTII,S$GLB,, | Performed by: PHYSICIAN ASSISTANT

## 2024-03-05 PROCEDURE — 3008F BODY MASS INDEX DOCD: CPT | Mod: CPTII,S$GLB,, | Performed by: PHYSICIAN ASSISTANT

## 2024-03-05 PROCEDURE — 99396 PREV VISIT EST AGE 40-64: CPT | Mod: S$GLB,,, | Performed by: PHYSICIAN ASSISTANT

## 2024-03-05 PROCEDURE — 1159F MED LIST DOCD IN RCRD: CPT | Mod: CPTII,S$GLB,, | Performed by: PHYSICIAN ASSISTANT

## 2024-03-05 PROCEDURE — 99999 PR PBB SHADOW E&M-EST. PATIENT-LVL III: CPT | Mod: PBBFAC,,, | Performed by: PHYSICIAN ASSISTANT

## 2024-03-05 PROCEDURE — 88142 CYTOPATH C/V THIN LAYER: CPT | Performed by: PHYSICIAN ASSISTANT

## 2024-03-05 PROCEDURE — 87624 HPV HI-RISK TYP POOLED RSLT: CPT | Performed by: PHYSICIAN ASSISTANT

## 2024-03-05 PROCEDURE — 3078F DIAST BP <80 MM HG: CPT | Mod: CPTII,S$GLB,, | Performed by: PHYSICIAN ASSISTANT

## 2024-03-05 RX ORDER — PRASTERONE 6.5 MG/1
INSERT VAGINAL
Qty: 28 EACH | Refills: 11 | Status: SHIPPED | OUTPATIENT
Start: 2024-03-05

## 2024-03-05 RX ORDER — CLOTRIMAZOLE AND BETAMETHASONE DIPROPIONATE 10; .64 MG/G; MG/G
CREAM TOPICAL 2 TIMES DAILY
Qty: 45 G | Refills: 0 | Status: SHIPPED | OUTPATIENT
Start: 2024-03-05

## 2024-03-05 NOTE — PROGRESS NOTES
CC: Well woman exam    Astrid Mane is a 58 y.o. female  presents for well woman exam.  LMP: Patient's last menstrual period was 2015..  No issues, problems, or complaints.  Denies vaginal bleeding. Using intrarosa and helping.   History of breast cancer completed 5 years of tamoxifen therapy in .      [facility]: [facility]  Pap: 10/19/2021 negative, HPV negative  PCP: Yessenia Boykin MD  Routine Screening Labs: 2023  Colonoscopy:  2018   DEXA: 3/30/2023 normal    Past Medical History:   Diagnosis Date    Anxiety     Asthma     Breast cancer     right breast estrogen receptor positive    PMB (postmenopausal bleeding)     PONV (postoperative nausea and vomiting)     Right thyroid nodule     Vaginal atrophy      Past Surgical History:   Procedure Laterality Date    BREAST LUMPECTOMY      right breast     SECTION      total 3    DILATION AND CURETTAGE OF UTERUS      HYSTEROSCOPY WITH DILATION AND CURETTAGE OF UTERUS N/A 10/21/2020    Procedure: HYSTEROSCOPY, WITH DILATION AND CURETTAGE OF UTERUS;  Surgeon: Monet Ferrer MD;  Location: [facility];  Service: OB/GYN;  Laterality: N/A;    HYSTEROSCOPY WITH DILATION AND CURETTAGE OF UTERUS N/A 2022    Procedure: HYSTEROSCOPY, WITH DILATION AND CURETTAGE OF UTERUS;  Surgeon: Monet Ferrer MD;  Location: [facility];  Service: OB/GYN;  Laterality: N/A;    TUBAL LIGATION       Social History     Socioeconomic History    Marital status:    Tobacco Use    Smoking status: Former     Current packs/day: 0.00     Types: Cigarettes     Quit date: 1990     Years since quittin.1    Smokeless tobacco: Former    Tobacco comments:     Have not smoked in 30 years   Substance and Sexual Activity    Alcohol use: Yes     Alcohol/week: 6.0 standard drinks of alcohol     Types: 6 Glasses of wine per week     Comment: socially    Drug use: No    Sexual activity: Yes     Partners: Male     Birth  control/protection: None     Social Determinants of Health     Financial Resource Strain: Low Risk  (2024)    Overall Financial Resource Strain (CARDIA)     Difficulty of Paying Living Expenses: Not hard at all   Food Insecurity: No Food Insecurity (2024)    Hunger Vital Sign     Worried About Running Out of Food in the Last Year: Never true     Ran Out of Food in the Last Year: Never true   Transportation Needs: No Transportation Needs (2024)    PRAPARE - Transportation     Lack of Transportation (Medical): No     Lack of Transportation (Non-Medical): No   Physical Activity: Sufficiently Active (2024)    Exercise Vital Sign     Days of Exercise per Week: 6 days     Minutes of Exercise per Session: 40 min   Stress: No Stress Concern Present (2024)    [employer] - Occupational Stress Questionnaire     Feeling of Stress : Only a little   Social Connections: Unknown (2024)    Social Connection and Isolation Panel [NHANES]     Frequency of Communication with Friends and Family: More than three times a week     Frequency of Social Gatherings with Friends and Family: Three times a week     Active Member of Clubs or Organizations: Yes     Attends Club or Organization Meetings: 1 to 4 times per year     Marital Status:    Housing Stability: Unknown (2024)    Housing Stability Vital Sign     Unable to Pay for Housing in the Last Year: No     Unstable Housing in the Last Year: No     Family History   Problem Relation Age of Onset    Asthma Mother     Heart disease Mother         78 2 stints    Coronary artery disease Father         heavy tobacco    Melanoma Maternal Grandmother 70    Ovarian cancer Neg Hx     Breast cancer Neg Hx     Cancer Neg Hx     Colon cancer Neg Hx      OB History          3    Para   3    Term   3            AB        Living             SAB        IAB        Ectopic        Multiple        Live Births                      Current Outpatient Medications:     albuterol (PROVENTIL/VENTOLIN HFA) 90 mcg/actuation inhaler, INHALE 2 PUFFS INTO THE LUNGS EVERY 6 HOURS AS NEEDED FOR WHEEZING, Disp: 18 g, Rfl: 0    albuterol (VENTOLIN HFA) 90 mcg/actuation inhaler, INHALE 2 PUFFS UNTO THE LUNGS EVERY 6 HOURS AS NEEDED FOR WHEEZING, Disp: 54 g, Rfl: 2    ALPRAZolam (XANAX) 0.5 MG tablet, TAKE 1 TABLET BY MOUTH TWICE DAILY AS NEEDED FOR FLYING ONLY, Disp: 10 tablet, Rfl: 0    atorvastatin (LIPITOR) 20 MG tablet, TAKE 1 TABLET(20 MG) BY MOUTH EVERY DAY, Disp: 90 tablet, Rfl: 0    cloNIDine (CATAPRES) 0.1 MG tablet, TAKE 1 TABLET BY MOUTH EVERY DAY, Disp: 90 tablet, Rfl: 3    clotrimazole-betamethasone 1-0.05% (LOTRISONE) cream, Apply topically 2 (two) times daily. X 7-10 days externally as directed, Disp: 45 g, Rfl: 0    loratadine (CLARITIN) 10 mg tablet, Take 10 mg by mouth once daily., Disp: , Rfl:     omeprazole (PRILOSEC) 40 MG capsule, Take 40 mg by mouth once daily., Disp: , Rfl:     ondansetron (ZOFRAN-ODT) 4 MG TbDL, 4 mg every 6 (six) hours as needed., Disp: , Rfl:     pantoprazole (PROTONIX) 40 MG tablet, TAKE 1 TABLET BY MOUTH EVERY MORNING ON AN EMPTY STOMACH FOR 2 WEEKS AS NEEDED, Disp: 90 tablet, Rfl: 0    phendimetrazine tartrate 35 mg Tab, Take 1 tablet by mouth 2 (two) times daily before meals., Disp: , Rfl:     prasterone, dhea, (INTRAROSA) 6.5 mg Inst, INSERT 1 IN THE VAGINA EVERY NIGHT AT BEDTIME, Disp: 28 each, Rfl: 11    semaglutide (OZEMPIC) 0.25 mg or 0.5 mg(2 mg/1.5 mL) pen injector, , Disp: , Rfl:     SERNIVO 0.05 % SprP, Apply topically., Disp: , Rfl:     venlafaxine (EFFEXOR-XR) 75 MG 24 hr capsule, Take 1 capsule (75 mg total) by mouth once daily., Disp: 30 capsule, Rfl: 11    The 10-year ASCVD risk score (Barbara RAPP, et al., 2019) is: 1.4%    Values used to calculate the score:      Age: 58 years      Sex: Female      Is Non- : No      Diabetic: No      Tobacco smoker: No      Systolic  "Blood Pressure: 110 mmHg      Is BP treated: No      HDL Cholesterol: 82 mg/dL      Total Cholesterol: 185 mg/dL    /77   Pulse 73   Ht 5' 6" (1.676 m)   Wt 75.8 kg (167 lb)   LMP 07/01/2015   BMI 26.95 kg/m²       ROS:  GENERAL: Denies weight gain or weight loss. Feeling well overall.   SKIN: Denies rash or lesions.   HEAD: Denies head injury or headache.   NODES: Denies enlarged lymph nodes.   CHEST: Denies chest pain or shortness of breath.   CARDIOVASCULAR: Denies palpitations or left sided chest pain.   ABDOMEN: No abdominal pain, constipation, diarrhea, nausea, vomiting or rectal bleeding.   URINARY: No frequency, dysuria, hematuria, or burning on urination.  REPRODUCTIVE: See HPI.   BREASTS: Denies pain, lumps, or nipple discharge.   HEMATOLOGIC: No easy bruisability or excessive bleeding.   MUSCULOSKELETAL: Denies joint pain or swelling.   NEUROLOGIC: Denies syncope or weakness.   PSYCHIATRIC: Denies depression, anxiety or mood swings.    PHYSICAL EXAM:  APPEARANCE: Well nourished, well developed, in no acute distress.  AFFECT: WNL, alert and oriented x 3  CHEST: Good respiratory effect  ABDOMEN: Soft.  No tenderness or masses.  No hepatosplenomegaly.  No hernias.  BREASTS: Symmetrical, no skin changes or visible lesions.  No palpable masses, nipple discharge bilaterally.  PELVIC: Normal external genitalia. +Pink patches scattered in the perineum, no tenderness or drainage.  Normal hair distribution.  Adequate perineal body, normal urethral meatus.  Vagina moist and well rugated without lesions or discharge.  Cervix pink +friable, without lesions, discharge or tenderness.  No significant cystocele or rectocele.  Bimanual exam shows uterus to be normal size, regular, mobile and nontender.  Adnexa without masses or tenderness.    EXTREMITIES: No edema.    ASSESSMENT:   Encounter for gynecological examination without abnormal finding  -     Liquid-Based Pap Smear, Screening  -     HPV High Risk " Genotypes, PCR    Atrophic vaginitis  -     prasterone, dhea, (INTRAROSA) 6.5 mg Inst; INSERT 1 IN THE VAGINA EVERY NIGHT AT BEDTIME  Dispense: 28 each; Refill: 11    Vulvar rash  -     clotrimazole-betamethasone 1-0.05% (LOTRISONE) cream; Apply topically 2 (two) times daily. X 7-10 days externally as directed  Dispense: 45 g; Refill: 0    History of breast cancer        PLAN:   Pap/HPV  Annual mammogram/breast MRI at [facility]  Continue Intrarosa  Lotrisone cream BID x 7-10 days to rash in perineum.  Follow up if symptoms do not resolve  Follow up annually or PRN    Patient was counseled today on A.C.S. Pap guidelines and recommendations for yearly pelvic exams, mammograms and monthly self breast exams; to see her PCP for other health maintenance.

## 2024-03-12 LAB
FINAL PATHOLOGIC DIAGNOSIS: NORMAL
Lab: NORMAL

## 2024-06-14 DIAGNOSIS — T45.1X5A HOT FLASHES DUE TO TAMOXIFEN: ICD-10-CM

## 2024-06-14 DIAGNOSIS — R23.2 HOT FLASHES DUE TO TAMOXIFEN: ICD-10-CM

## 2024-06-14 RX ORDER — VENLAFAXINE HYDROCHLORIDE 75 MG/1
75 CAPSULE, EXTENDED RELEASE ORAL
Qty: 30 CAPSULE | Refills: 1 | Status: SHIPPED | OUTPATIENT
Start: 2024-06-14

## 2025-01-28 ENCOUNTER — OFFICE VISIT (OUTPATIENT)
Dept: HEMATOLOGY/ONCOLOGY | Facility: CLINIC | Age: 60
End: 2025-01-28
Payer: COMMERCIAL

## 2025-01-28 VITALS
HEART RATE: 73 BPM | OXYGEN SATURATION: 100 % | SYSTOLIC BLOOD PRESSURE: 113 MMHG | WEIGHT: 167 LBS | TEMPERATURE: 98 F | HEIGHT: 66 IN | BODY MASS INDEX: 26.84 KG/M2 | DIASTOLIC BLOOD PRESSURE: 58 MMHG

## 2025-01-28 DIAGNOSIS — Z85.3 PERSONAL HISTORY OF MALIGNANT NEOPLASM OF BREAST: Primary | ICD-10-CM

## 2025-01-28 DIAGNOSIS — T45.1X5A HOT FLASHES DUE TO TAMOXIFEN: ICD-10-CM

## 2025-01-28 DIAGNOSIS — N95.1 HOT FLASHES DUE TO MENOPAUSE: ICD-10-CM

## 2025-01-28 DIAGNOSIS — R23.2 HOT FLASHES DUE TO TAMOXIFEN: ICD-10-CM

## 2025-01-28 PROCEDURE — 3074F SYST BP LT 130 MM HG: CPT | Mod: CPTII,S$GLB,, | Performed by: PHYSICIAN ASSISTANT

## 2025-01-28 PROCEDURE — 99999 PR PBB SHADOW E&M-EST. PATIENT-LVL III: CPT | Mod: PBBFAC,,, | Performed by: PHYSICIAN ASSISTANT

## 2025-01-28 PROCEDURE — 3008F BODY MASS INDEX DOCD: CPT | Mod: CPTII,S$GLB,, | Performed by: PHYSICIAN ASSISTANT

## 2025-01-28 PROCEDURE — 99213 OFFICE O/P EST LOW 20 MIN: CPT | Mod: S$GLB,,, | Performed by: PHYSICIAN ASSISTANT

## 2025-01-28 PROCEDURE — 3078F DIAST BP <80 MM HG: CPT | Mod: CPTII,S$GLB,, | Performed by: PHYSICIAN ASSISTANT

## 2025-01-28 RX ORDER — CLONIDINE HYDROCHLORIDE 0.1 MG/1
TABLET ORAL
Qty: 90 TABLET | Refills: 3 | Status: SHIPPED | OUTPATIENT
Start: 2025-01-28

## 2025-01-28 RX ORDER — VENLAFAXINE HYDROCHLORIDE 75 MG/1
75 CAPSULE, EXTENDED RELEASE ORAL DAILY
Qty: 30 CAPSULE | Refills: 5 | Status: SHIPPED | OUTPATIENT
Start: 2025-01-28

## 2025-01-28 NOTE — PROGRESS NOTES
Subjective     Patient ID: Astrid Mane is a 59 y.o. female.    Chief Complaint: History of breast cancer    Mrs. Mane is a 59-year-old white female seen for  carcinoma of the right breast.  She has been completed 5 years of Tamoxifen therapy in 2020.    Patient feeling well.  No self palpated breast mass or tenderness.    No fever, chills, nausea, vomiting or shortness of breath. Normal bowel and urinary function.  Exercising regularly. 5 X week.  She continues to take clonidine and effexor for control of hot flashes which she has had since her breast cancer diagnosis.     Mammogram and US from [facility] on 1/16/2024 (see care everywhere) were unremarkable. She continues alternating mammograms and MRIs every 6 months and would like to transfer her imaging to [facility]. MRI from 7/26/2023 was unremarkable.  MRI breast from 8/2024 at [facility] was unremarkable.      Breast history:  Mammograms on February 23, 2015 showed an asymmetric density in the right breast which had become more conspicuous.  A biopsy was recommended.   On March 2 a needle biopsy showed infiltrating ductal carcinoma grade 2 with some intermediate grade DCIS.  The tumor was % ER positive, % KY positive, HER-2 was 2+ with negative FISH.  An MRI on March 6 showed a 1.1 cm lesion in the posterior right breast.  On March 23, A left breast biopsy for an abnormality noted at the 1:00 position showed focal adenosis and focal ductal hyperplasia without atypia.     On March 26 she underwent lumpectomy and sentinel lymph node biopsy which was performed by Dr. Ishmael Aguilera at [facility].  The pathology from that surgery revealed a  low grade infiltrating ductal carcinoma, histologic grade 2, nuclear grade 2, mitotic index 1, which measured 6 mm.  There was some associated DCIS. Margins were negative.  0/2 axillary lymph nodes were involved with tumor.      Final pathological stage TIb N0, stage IA.  An Oncotype score  returned at 18 (low risk).     She completed radiation therapy on June 4, 2015 and started tamoxifen several weeks later.      Review of Systems   Constitutional: Negative.    HENT:  Negative for nasal congestion, rhinorrhea, sore throat and trouble swallowing.    Eyes:  Negative for visual disturbance.   Respiratory:  Negative for cough, chest tightness and shortness of breath.    Cardiovascular:  Negative for chest pain, palpitations and leg swelling.   Gastrointestinal:  Negative for abdominal pain, blood in stool, constipation, diarrhea, nausea and vomiting.   Genitourinary:  Negative for dysuria, hematuria and vaginal bleeding.   Musculoskeletal:  Negative for arthralgias, back pain and myalgias.   Integumentary:  Negative for pallor and rash.   Neurological:  Negative for dizziness, weakness and headaches.   Hematological:  Negative for adenopathy. Does not bruise/bleed easily.   Psychiatric/Behavioral:  Negative for dysphoric mood and suicidal ideas. The patient is not nervous/anxious.           Objective     Physical Exam  Vitals reviewed.   Constitutional:       General: She is not in acute distress.     Appearance: She is well-developed.      Comments: Presents alone   HENT:      Head: Normocephalic.      Mouth/Throat:      Pharynx: No oropharyngeal exudate.   Eyes:      General: No scleral icterus.     Conjunctiva/sclera: Conjunctivae normal.      Pupils: Pupils are equal, round, and reactive to light.   Neck:      Thyroid: No thyromegaly.   Cardiovascular:      Rate and Rhythm: Normal rate and regular rhythm.   Pulmonary:      Effort: Pulmonary effort is normal. No respiratory distress.      Breath sounds: Normal breath sounds.      Comments: Right breast with well healed lumpectomy incision with some underlying fibrosis. No mass, nodule or skin changes. Left breast with some scar tissue just below the incision line but no mass, nodule or skin changes.   No axillary or supraclavicular adenopathy.   Lungs  clear to ausculation     Abdominal:      General: Bowel sounds are normal. There is no distension.      Palpations: Abdomen is soft. There is no mass.      Tenderness: There is no abdominal tenderness.   Musculoskeletal:         General: No tenderness. Normal range of motion.      Cervical back: Normal range of motion and neck supple.      Comments: No spinal or paraspinal tenderness to palpation     Lymphadenopathy:      Cervical: No cervical adenopathy.   Skin:     General: Skin is warm and dry.   Neurological:      Mental Status: She is alert and oriented to person, place, and time.      Cranial Nerves: No cranial nerve deficit.   Psychiatric:         Behavior: Behavior normal.         Thought Content: Thought content normal.            Assessment and Plan     1. Personal history of malignant neoplasm of breast    2. Hot flashes due to menopause       1) H/O breast cancer- Patient is clinically without evidence of disease and up to date on imaging. She will have her next mammogram at [facility] in February 2025. RTC in one year for clinical breast exam.  2)Hot flashes - continued and relieved with clonidine (0.1mg) and effexor (75 mg)  for which refills were provided.    Patient knows to call in interim if any issues.    No follow-ups on file.

## 2025-02-26 DIAGNOSIS — T45.1X5A HOT FLASHES DUE TO TAMOXIFEN: ICD-10-CM

## 2025-02-26 DIAGNOSIS — R23.2 HOT FLASHES DUE TO TAMOXIFEN: ICD-10-CM

## 2025-03-05 RX ORDER — VENLAFAXINE HYDROCHLORIDE 75 MG/1
75 CAPSULE, EXTENDED RELEASE ORAL DAILY
Qty: 30 CAPSULE | Refills: 5 | Status: SHIPPED | OUTPATIENT
Start: 2025-03-05

## 2025-08-03 DIAGNOSIS — R23.2 HOT FLASHES DUE TO TAMOXIFEN: ICD-10-CM

## 2025-08-03 DIAGNOSIS — T45.1X5A HOT FLASHES DUE TO TAMOXIFEN: ICD-10-CM

## 2025-08-05 RX ORDER — VENLAFAXINE HYDROCHLORIDE 75 MG/1
CAPSULE, EXTENDED RELEASE ORAL
Qty: 90 CAPSULE | Refills: 1 | Status: SHIPPED | OUTPATIENT
Start: 2025-08-05

## (undated) DEVICE — SOL IRR NACL .9% 3000ML

## (undated) DEVICE — GLOVE BIOGEL SKINSENSE PI 6.0

## (undated) DEVICE — PACK FLUENT DISPOSABLE

## (undated) DEVICE — SOL PVP-I SCRUB 7.5% 4OZ

## (undated) DEVICE — SEE L#120831

## (undated) DEVICE — SEAL LENS SCOPE MYOSURE

## (undated) DEVICE — Device

## (undated) DEVICE — GLOVE BIOGEL SKINSENSE PI 6.5

## (undated) DEVICE — JELLY SURGILUBE 5GR

## (undated) DEVICE — SOL 9P NACL IRR PIC IL

## (undated) DEVICE — SOL BETADINE 5%

## (undated) DEVICE — SET BASIN 48X48IN 6000ML RING

## (undated) DEVICE — PAD PREP 50/CA